# Patient Record
Sex: FEMALE | Race: WHITE
[De-identification: names, ages, dates, MRNs, and addresses within clinical notes are randomized per-mention and may not be internally consistent; named-entity substitution may affect disease eponyms.]

---

## 2017-03-15 ENCOUNTER — HOSPITAL ENCOUNTER (INPATIENT)
Dept: HOSPITAL 74 - JER | Age: 57
LOS: 16 days | Discharge: HOME | DRG: 853 | End: 2017-03-31
Attending: INTERNAL MEDICINE | Admitting: INTERNAL MEDICINE
Payer: COMMERCIAL

## 2017-03-15 VITALS — BODY MASS INDEX: 39.6 KG/M2

## 2017-03-15 DIAGNOSIS — E03.9: ICD-10-CM

## 2017-03-15 DIAGNOSIS — K75.0: ICD-10-CM

## 2017-03-15 DIAGNOSIS — J98.11: ICD-10-CM

## 2017-03-15 DIAGNOSIS — I10: ICD-10-CM

## 2017-03-15 DIAGNOSIS — Z71.3: ICD-10-CM

## 2017-03-15 DIAGNOSIS — K52.9: ICD-10-CM

## 2017-03-15 DIAGNOSIS — J98.4: ICD-10-CM

## 2017-03-15 DIAGNOSIS — D72.829: ICD-10-CM

## 2017-03-15 DIAGNOSIS — J90: ICD-10-CM

## 2017-03-15 DIAGNOSIS — E87.6: ICD-10-CM

## 2017-03-15 DIAGNOSIS — J18.9: ICD-10-CM

## 2017-03-15 DIAGNOSIS — Z98.84: ICD-10-CM

## 2017-03-15 DIAGNOSIS — R18.8: ICD-10-CM

## 2017-03-15 DIAGNOSIS — A41.9: Primary | ICD-10-CM

## 2017-03-15 DIAGNOSIS — K44.9: ICD-10-CM

## 2017-03-15 DIAGNOSIS — E66.01: ICD-10-CM

## 2017-03-15 DIAGNOSIS — K31.4: ICD-10-CM

## 2017-03-15 DIAGNOSIS — R00.0: ICD-10-CM

## 2017-03-15 DIAGNOSIS — K63.89: ICD-10-CM

## 2017-03-15 DIAGNOSIS — H92.01: ICD-10-CM

## 2017-03-15 DIAGNOSIS — K59.00: ICD-10-CM

## 2017-03-15 LAB
ALBUMIN SERPL-MCNC: 3.1 G/DL (ref 3.4–5)
ALP SERPL-CCNC: 79 U/L (ref 45–117)
ALT SERPL-CCNC: 13 U/L (ref 12–78)
ANION GAP SERPL CALC-SCNC: 9 MMOL/L (ref 8–16)
AST SERPL-CCNC: 11 U/L (ref 15–37)
BASOPHILS # BLD: 0.5 % (ref 0–2)
BILIRUB SERPL-MCNC: 0.8 MG/DL (ref 0.2–1)
CALCIUM SERPL-MCNC: 8.1 MG/DL (ref 8.5–10.1)
CO2 SERPL-SCNC: 26 MMOL/L (ref 21–32)
CREAT SERPL-MCNC: 0.6 MG/DL (ref 0.55–1.02)
DEPRECATED RDW RBC AUTO: 13.7 % (ref 11.6–15.6)
EOSINOPHIL # BLD: 0.4 % (ref 0–4.5)
GLUCOSE SERPL-MCNC: 116 MG/DL (ref 74–106)
INR BLD: 1.48 (ref 0.82–1.09)
MCH RBC QN AUTO: 28.8 PG (ref 25.7–33.7)
MCHC RBC AUTO-ENTMCNC: 34.4 G/DL (ref 32–36)
MCV RBC: 83.7 FL (ref 80–96)
NEUTROPHILS # BLD: 88.3 % (ref 42.8–82.8)
PMV BLD: 7.9 FL (ref 7.5–11.1)
PROT SERPL-MCNC: 7.4 G/DL (ref 6.4–8.2)
PT PNL PPP: 16.4 SEC (ref 9.98–11.88)
TROPONIN I SERPL-MCNC: < 0.02 NG/ML (ref 0–0.05)
WBC # BLD AUTO: 14.5 K/MM3 (ref 4–10)

## 2017-03-15 NOTE — PDOC
Rapid Medical Evaluation


Time Seen by Provider: 03/15/17 17:42


Medical Evaluation: 


 Allergies











Allergy/AdvReac Type Severity Reaction Status Date / Time


 


No Known Allergies Allergy   Verified 03/15/17 17:42














03/15/17 17:43


I have performed a brief in-person evaluation of this patient.


Ms. Krishna is a 57 yo F with a history of HTN, hypothyroidism presents with 

a chief complaint of Right abdominal pain and right chest pain


Pain began 2 weeks ago


She was seen in Urgent care, diagnosed with muscle spasm, given pain medications


Was then seen by PMD 1 week ago, given pain medications


Never got chest xray


(+) dizziness


(+) sob


(-) nausea, vomiting, diarrhea


Took Tylenol at 3pm for pain





Pertinent physical exam findings:


Temp 100.5, HR: 130, O2: 95%


Tachycardiac


Right upper abdominal pain, Right lower costal pain


No rash


Decreased breath sounds Right base





I have ordered the following:


EKG, sepsis order set





Pt will be taken immediately to the ER


03/15/17 17:43








03/15/17 17:48








03/15/17 17:49








03/15/17 17:55


EKG: Sinus tachycardia, no ischemic changes








03/15/17 17:57

## 2017-03-15 NOTE — PN
<Ella Berg - Last Filed: 03/15/17 23:14>





Teaching Attending Note


Name of Resident: Loimerrill Ramsay





<Maria Luisa Kumar - Last Filed: 03/16/17 04:04>





Teaching Attending Note


ATTENDING PHYSICIAN STATEMENT





I saw and evaluated the patient.


I reviewed the resident's note and discussed the case with the resident.


I agree with the resident's findings and plan as documented.








SUBJECTIVE:


The patient is a 57 year old female, with a significant past medical history of 

HTN, who presents to the ED with R sided abdominal pain and chest pain that 

began 2 days ago. The patient reports the chest pain is localized to her R side 

and radiates under her breast to her back. The patient states the chest pain is 

constant, varying in intensity and associated with nausea. The patient 

describes her abdominal pain is associated with vomiting. The patient was seen 

at [facility] and was diagnosed with muscle spasm for which she was given 

methocarbamol 500 mg. She denies headache or dizziness. She denies fever, 

chills or diarrhea. She denies dysuria, frequency, urgency or hematuria.  





PMHx: hypothyroidism, migraines, gallstones, incarcerated R ventral hernia 


PSHx- cholecystectomy, hernioplasty.


Social Hx: Denies toxic habits


Fam hx: Both aunts had liver cancer, Mother: HTN, dementia and Dad: HTN. 





OBJECTIVE:


 Last Vital Signs











Temp Pulse Resp BP Pulse Ox


 


 98.1 F   102 H  20   107/75   97 


 


 03/16/17 00:01  03/16/17 00:01  03/16/17 01:04  03/16/17 00:01  03/16/17 01:04








GENERAL: + Class II Obese. + Pale. Awake, alert, and fully oriented, in no 

acute distress


HEENT: Atraumatic. PERRLA, EOMI. Moist mucosa. No JVD


LUNGS: +R lower lung diminished breath sound. No distress, speaks full sentences

, clear to auscultation bilaterally 


HEART: Regular rate and rhythm, normal S1 and S2, no murmurs, rubs or gallops, 

peripheral pulses normal and equal bilaterally. 


ABDOMEN: +RUQ tenderness with minimal guarding no rebound. Soft, normoactive 

bowel sounds. No masses


EXTREMITIES: +1 Pitting edema. Normal inspection, Normal range of motion. No 

clubbing or cyanosis. 


NEUROLOGICAL: Cranial nerves II through XII grossly intact.  Normal speech, 

normal gait, no focal sensorimotor deficits 


SKIN: + Surgical scar supraumbilica and infraumbilical area. Warm, Dry, normal 

turgor, no rashes or lesions noted.








 CBCD











WBC  14.5 K/mm3 (4.0-10.0)  H D 03/15/17  22:50    


 


RBC  3.90 M/mm3 (3.60-5.2)   03/15/17  22:50    


 


Hgb  11.2 GM/dL (10.7-15.3)  D 03/15/17  22:50    


 


Hct  32.7 % (32.4-45.2)   03/15/17  22:50    


 


MCV  83.7 fl (80-96)   03/15/17  22:50    


 


MCHC  34.4 g/dl (32.0-36.0)   03/15/17  22:50    


 


RDW  13.7 % (11.6-15.6)   03/15/17  22:50    


 


Plt Count  332 K/MM3 (134-434)   03/15/17  22:50    


 


MPV  7.9 fl (7.5-11.1)   03/15/17  22:50    








 CMP











Sodium  134 mmol/L (136-145)  L  03/15/17  18:02    


 


Potassium  3.5 mmol/L (3.5-5.1)   03/15/17  18:02    


 


Chloride  99 mmol/L ()   03/15/17  18:02    


 


Carbon Dioxide  26 mmol/L (21-32)   03/15/17  18:02    


 


Anion Gap  9  (8-16)   03/15/17  18:02    


 


BUN  11 mg/dL (7-18)   03/15/17  18:02    


 


Creatinine  0.6 mg/dL (0.55-1.02)   03/15/17  18:02    


 


Creat Clearance w eGFR  > 60  (>60)   03/15/17  18:02    


 


Calcium  8.1 mg/dL (8.5-10.1)  L  03/15/17  18:02    


 


Total Bilirubin  0.8 mg/dL (0.2-1.0)  D 03/15/17  18:02    


 


AST  11 U/L (15-37)  L D 03/15/17  18:02    


 


ALT  13 U/L (12-78)  D 03/15/17  18:02    


 


Alkaline Phosphatase  79 U/L ()   03/15/17  18:02    


 


Total Protein  7.4 g/dl (6.4-8.2)   03/15/17  18:02    


 


Albumin  3.1 g/dl (3.4-5.0)  L  03/15/17  18:02    








Imaging:


Chest CT w Contrast


Impression: There is no CT evidence of pulmonary embolism. A small to moderate 

right pleural effusion is noted with associated basilar compressive 

atelectasis. In comparison to an abdomen CT study of 9/27/2014 interval 

development of a 5 x 3.5 x 2.4 cm mass lesion is noted along the posterior 

border of the right hepatic lobe which may represent a peritoneal implant. 

Additional interval findings are noted within the partially imaged upper 

abdomen as discussed. 








ASSESSMENT AND PLAN:





1.) Liver mass r/o hepatocellular carcinoma vs metastasis vs benign 


CT abdomen and pelvis


AFP


Hepatitis B and C studies


D/c usg


Coagulation profile


Consider GI consult


Thoracentesis


IR consult


Liver US


Patient denied any pain medications








Patient admitted to Med Surg


Documentation prepared by Maria Luisa Kumar, acting as medical scribe for Ella Berg MD.

## 2017-03-15 NOTE — PDOC
History of Present Illness





- General


History Source: Patient


Exam Limitations: No Limitations





- History of Present Illness


Initial Comments: 


03/15/17 19:32


The patient is a 57 year old female, with significant past medical history of 

hypothyroidism, HTN, who presents today complaining of 2 weeks of right 

abdominal pain and right sided chest pain. The patient states that the pain 

started on her upper right side and has progressively radiated to under the 

right breast and the back. The pain is constant, but increases in intensity at 

times.  She states that the pain is accompanied by nausea. The last time she 

vomited was on Monday, 2 days ago. She states that she did not eat today 

because the pain and medication were making her nauseous. She notes that she 

visited the Urgent Care at [facility] for this pain 2 weeks ago where they 

treated her for a muscle spasm. She was prescribed Methocarbamol (500mg) for 

the pain has been taking it intermittently. 





Denies fever, chills, vomiting. 


Denies SOB, cough. 





Allergies: none reported


Surgical Hx: cholecystectomy


PCP: Dr. Linn ([facility])











<Antonia Crawford - Last Filed: 03/15/17 22:37>





<Ej Faustin - Last Filed: 03/15/17 22:56>





- General


Chief Complaint: Pain, Acute


Stated Complaint: PAIN, ACUTE


Time Seen by Provider: 03/15/17 17:42





Past History





<Antonia Crawford - Last Filed: 03/15/17 22:37>





- Past Medical History


HTN: Yes


Thyroid Disease: Yes





- Surgical History


Abdominal Surgery: Yes (laparotomy. HERNIA ERPAIR X 2)


Cholecystectomy: Yes





- Psycho/Social/Smoking Cessation Hx


Anxiety: No


Suicidal Ideation: No


Smoking History: Never smoked


Have you smoked in the past 12 months: No


Number of Cigarettes Smoked Daily: 0


Hx Alcohol Use: No


Drug/Substance Use Hx: No


Substance Use Type: None


Hx Substance Use Treatment: No





<Ej Faustin - Last Filed: 03/15/17 22:56>





- Past Medical History


Allergies/Adverse Reactions: 


 Allergies











Allergy/AdvReac Type Severity Reaction Status Date / Time


 


No Known Allergies Allergy   Verified 03/15/17 17:47











Home Medications: 


Ambulatory Orders





Levothyroxine [Synthroid -] 125 mcg PO DAILY 09/27/14 


Amlodipine Besylate [Norvasc -] 10 mg PO DAILY 03/15/17 


Losartan/Hydrochlorothiazide [Losartan-Hctz 50-12.5 mg Tab] 1 each PO DAILY 03/

15/17 


Methocarbamol [Robaxin -] 500 mg PO DAILY PRN 03/15/17 











**Review of Systems





- Review of Systems


Able to Perform ROS?: Yes


Comments:: 





03/15/17 19:32


CONSTITUTIONAL: No fever, no chills, no fatigue


EYES: No visual changes


ENT: No ear pain, no sore throat


CARDIOVASCULAR: +right sided chest pain. No palpitations


RESPIRATORY: No cough, no SOB


GI: +nausea. No abdominal pain, no vomiting, no constipation, no diarrhea


GENITOURINARY: No dysuria, no frequency, no hematuria


MUSKULOSKELETAL: No backpain, no joint pain, no myalgias


SKIN: No rash


NEURO: No headache





<Antonia Crawford - Last Filed: 03/15/17 22:37>





*Physical Exam





- Vital Signs


 Last Vital Signs











Temp Pulse Resp BP Pulse Ox


 


 100.5 F H  130 H  18   145/68   95 


 


 03/15/17 17:43  03/15/17 17:43  03/15/17 17:43  03/15/17 17:43  03/15/17 17:43














- Physical Exam


Comments: 





03/15/17 19:58


CONSTITUTIONAL: Awake, alert. Morbidly obese. In moderate distress. 


HEAD: Normocephalic; atraumatic


EYES: PERRL; EOM intact. No icterus


ENMT: External appears normal; normal oropharynx. Mucous membranes dry. 


NECK: Supple; non-tender; no cervical lymphadenopathy


CARD: Normal S1, S2; no murmurs, rubs, or gallops


RESP: Normal chest excursion with respiration; Decreased breast sounds at the 

right base. no wheezes, rhonchi, or rales


CHEST: Severe right chest wall tenderness  intercostal spaces 6,7, and 8 along 

the midclavicular line.


ABD: Soft, non-distended; non-tender; no palpable organomegaly, no palpable


hernias


EXT: Normal ROM in all four extremities; non-tender to palpation; distal pulses 

intact


SKIN: Warm, dry, no rash


NEURO: No focal neurological deficiencies.





<Antonia Crawford - Last Filed: 03/15/17 22:37>





- Vital Signs


 Last Vital Signs











Temp Pulse Resp BP Pulse Ox


 


 100.5 F H  130 H  18   145/68   95 


 


 03/15/17 17:43  03/15/17 17:43  03/15/17 17:43  03/15/17 17:43  03/15/17 17:43














<Ej Faustin - Last Filed: 03/15/17 22:56>





**Heart Score/ECG Review


  ** #1





03/15/17 19:36


Sinus tachycardia


Otherwise normal ECG





<Antonia Crawford - Last Filed: 03/15/17 22:37>





ED Treatment Course





- LABORATORY


CBC & Chemistry Diagram: 


 03/15/17 18:02





 03/15/17 18:02





- Medications


Given in the ED: 


ED Medications














Discontinued Medications














Generic Name Dose Route Start Last Admin





  Trade Name Freq  PRN Reason Stop Dose Admin


 


Ibuprofen  600 mg 03/15/17 17:55 03/15/17 19:14





  Motrin -  PO 03/15/17 17:56  Not Given





  ONCE ONE   














<Antonia Crawford - Last Filed: 03/15/17 22:37>





- LABORATORY


CBC & Chemistry Diagram: 


 03/15/17 18:02





 03/15/17 18:02





<Ej Faustin - Last Filed: 03/15/17 22:56>





Medical Decision Making





- Medical Decision Making





03/15/17 22:54


Patient is a morbidly obese 56-year-old female with history of hypertension and 

hypothyroidism who presented with increasing right lower chest and right upper 

abdominal pain for the past 2 weeks. Initial evaluation, patient noted to be 

febrile and tachycardic with decreased breath sounds at the right base. Initial 

CBC revealed leukocytosis of 12.3 with predominance of neutrophils. Chest x-ray 

revealed right lower lobe infiltrate. Blood cultures were obtained and patient 

received ceftriaxone and Zithromax IV. After administration of IV antibiotics, 

left EJ peripheral line infiltrated a small amount of fluid extravasated 

subcutaneously.   Ice was applied. A 22-gauge peripheral IV catheter was 

inserted and left forearm. CT of chest with IV contrast was obtained for the PE 

protocol. No evidence of pulmonary embolism was noted, however, moderate size 

right pleural effusion and compressive atelectasis was noted. Also noted was a 

5 cm right hepatic lobe mass which may represent a peritoneal implant. Patient 

will require admission for further evaluation and treatment of right pleural 

effusion and hepatic lobe infiltrate. Patient may require thoracentesis for 

evaluation of possible empyema versus malignant effusion.





<Ej Faustin - Last Filed: 03/15/17 22:56>





*DC/Admit/Observation/Transfer





- Attestations


Scribe Attestion: 





03/15/17 19:32





Documentation prepared by MAUREEN Cobb, acting as medical scribe 

for Ej Faustin MD.





<Antonia Crawford - Last Filed: 03/15/17 22:37>





- Discharge Dispostion


Admit: Yes





- Attestations


Physician Attestion: 





03/15/17 22:36


The documentation was prepared by the scribe under my direct supervision. I 

have reviewed the documentation which correctly represents the findings, 

medical decision-making and critical action taken by me.





<Ej Faustin - Last Filed: 03/15/17 22:56>


Diagnosis at time of Disposition: 


 Pleural effusion, Liver mass, right lobe

## 2017-03-16 LAB
ALBUMIN SERPL-MCNC: 3 G/DL (ref 3.4–5)
ANION GAP SERPL CALC-SCNC: 11 MMOL/L (ref 8–16)
APPEARANCE PLR: (no result)
APTT BLD: 33.8 SECONDS (ref 26.9–34.4)
BASOPHILS # BLD: 0.7 % (ref 0–2)
BILIRUB DIRECT SERPL-MCNC: 158 U/L (ref 84–246)
CALCIUM SERPL-MCNC: 7.9 MG/DL (ref 8.5–10.1)
CHLORIDE PLR-SCNC: 99.39 MMOL/L
CO2 SERPL-SCNC: 28 MMOL/L (ref 21–32)
COLOR PLR: (no result)
CREAT SERPL-MCNC: 0.6 MG/DL (ref 0.55–1.02)
DEPRECATED RDW RBC AUTO: 13.9 % (ref 11.6–15.6)
EOSINOPHIL # BLD: 1.3 % (ref 0–4.5)
FIBRINOGEN PPP-MCNC: 512 MG/DL (ref 238–498)
GLUCOSE PLR-MCNC: 68.32 MG/DL
GLUCOSE SERPL-MCNC: 106 MG/DL (ref 74–106)
INR BLD: 1.54 (ref 0.82–1.09)
LDH PLR-CCNC: 961.94 U/L
LYMPHOCYTES NFR PLR MANUAL: 30 %
MCH RBC QN AUTO: 28.5 PG (ref 25.7–33.7)
MCHC RBC AUTO-ENTMCNC: 33.5 G/DL (ref 32–36)
MCV RBC: 85.1 FL (ref 80–96)
NEUTROPHILS # BLD: 84.6 % (ref 42.8–82.8)
NEUTROPHILS NFR PLR MANUAL: 35 %
PLATELET # BLD AUTO: 332 K/MM3 (ref 134–434)
PLATELET # BLD AUTO: 333 K/MM3 (ref 134–434)
PLATELET # BLD EST: ADEQUATE 10*3/UL
PLATELET COMMENT2: (no result)
PLATELET COMMENT3: (no result)
PLEURAL FLUID SOURCE: (no result)
PMV BLD: 7.5 FL (ref 7.5–11.1)
PROT PLR-MCNC: 5.1 G/L
PT PNL PPP: 17.1 SEC (ref 9.98–11.88)
WBC # BLD AUTO: 9.8 K/MM3 (ref 4–10)

## 2017-03-16 PROCEDURE — 0W993ZX DRAINAGE OF RIGHT PLEURAL CAVITY, PERCUTANEOUS APPROACH, DIAGNOSTIC: ICD-10-PCS | Performed by: RADIOLOGY

## 2017-03-16 RX ADMIN — CEFTRIAXONE SODIUM SCH MLS/HR: 2 INJECTION, POWDER, FOR SOLUTION INTRAMUSCULAR; INTRAVENOUS at 17:48

## 2017-03-16 RX ADMIN — HEPARIN SODIUM SCH UNIT: 5000 INJECTION, SOLUTION INTRAVENOUS; SUBCUTANEOUS at 22:06

## 2017-03-16 RX ADMIN — ACETAMINOPHEN PRN MG: 325 TABLET ORAL at 09:29

## 2017-03-16 RX ADMIN — AZITHROMYCIN DIHYDRATE SCH MLS/HR: 500 INJECTION, POWDER, LYOPHILIZED, FOR SOLUTION INTRAVENOUS at 17:48

## 2017-03-16 RX ADMIN — POTASSIUM CHLORIDE SCH MEQ: 1500 TABLET, EXTENDED RELEASE ORAL at 22:05

## 2017-03-16 RX ADMIN — LEVOTHYROXINE SODIUM SCH MCG: 125 TABLET ORAL at 06:40

## 2017-03-16 RX ADMIN — LOSARTAN POTASSIUM SCH MG: 50 TABLET, FILM COATED ORAL at 14:36

## 2017-03-16 RX ADMIN — AMLODIPINE BESYLATE SCH MG: 10 TABLET ORAL at 14:36

## 2017-03-16 RX ADMIN — ACETAMINOPHEN PRN MG: 325 TABLET ORAL at 22:06

## 2017-03-16 RX ADMIN — ACETAMINOPHEN PRN MG: 325 TABLET ORAL at 17:49

## 2017-03-16 RX ADMIN — HYDROCHLOROTHIAZIDE SCH MG: 12.5 CAPSULE ORAL at 14:36

## 2017-03-16 NOTE — PN
Physical Exam: 


SUBJECTIVE: Patient seen and examined at bedside 


Feels better after thoracentesis 








OBJECTIVE:





 Vital Signs











 Period  Temp  Pulse  Resp  BP Sys/Olivares  Pulse Ox


 


 Last 24 Hr  98.1 F-102 F  102-117  18-20  107-152/64-93  92-99











GENERAL: The patient is awake, alert, and fully oriented, in no acute distress.


HEAD: Normal with no signs of trauma. 


NECK:  supple. 


LUNGS: Breath sounds equal, clear to auscultation bilaterally, no wheezes, no 

crackles, no 


accessory muscle use. 


HEART: Regular rate and rhythm, S1, S2 without murmur, rub or gallop.


ABDOMEN: right sided abdominal trenderness right flank tenderness right chest 

tenderness soft non distended 


NEUROLOGICAL: Cranial nerves II through XII grossly intact. Normal speech


PSYCH: Normal mood, normal affect.

















 Laboratory Results - last 24 hr











  03/16/17 03/16/17 03/16/17





  07:50 07:50 08:05


 


WBC  9.8  D  


 


RBC  4.13  


 


Hgb  11.8  


 


Hct  35.1  


 


MCV  85.1  


 


MCHC  33.5  


 


RDW  13.9  


 


Plt Count  333  


 


MPV  7.5  


 


Neutrophils %  84.6 H  


 


Lymphocytes %  5.8 L D  


 


Monocytes %  7.6  


 


Eosinophils %  1.3  D  


 


Basophils %  0.7  


 


INR    1.54 H


 


PTT (Actin FS)    33.8


 


Fibrinogen    512.0 H


 


Fibrin Degrad Products    >10 & <40


 


Sodium   136 


 


Potassium   3.3 L 


 


Chloride   97 L 


 


Carbon Dioxide   28 


 


Anion Gap   11 


 


BUN   9 


 


Creatinine   0.6 


 


Random Glucose   106 


 


Calcium   7.9 L 


 


LD Total   158 


 


Albumin   3.0 L 


 


Pleural Fluid Source   


 


Pleural Color   


 


Pleural Appearance   


 


Pleural WBC   


 


Pleural RBC   


 


Pleural Chloride   


 


Pleural Total Protein   


 


Pleural Albumin   


 


Pleural LDH   


 


Pleural Glucose   


 


Pleural Amylase   


 


Pleural Cholesterol   


 


Pleural Triglycerides   














  03/16/17





  11:25


 


WBC 


 


RBC 


 


Hgb 


 


Hct 


 


MCV 


 


MCHC 


 


RDW 


 


Plt Count 


 


MPV 


 


Neutrophils % 


 


Lymphocytes % 


 


Monocytes % 


 


Eosinophils % 


 


Basophils % 


 


INR 


 


PTT (Actin FS) 


 


Fibrinogen 


 


Fibrin Degrad Products 


 


Sodium 


 


Potassium 


 


Chloride 


 


Carbon Dioxide 


 


Anion Gap 


 


BUN 


 


Creatinine 


 


Random Glucose 


 


Calcium 


 


LD Total 


 


Albumin 


 


Pleural Fluid Source  Right pleural


 


Pleural Color  Cloudy


 


Pleural Appearance  Cloudy


 


Pleural WBC  4280


 


Pleural RBC  6843


 


Pleural Chloride  99.39


 


Pleural Total Protein  5.1


 


Pleural Albumin  2


 


Pleural LDH  961.94


 


Pleural Glucose  68.324


 


Pleural Amylase  19.762


 


Pleural Cholesterol  83


 


Pleural Triglycerides  35








Active Medications











Generic Name Dose Route Start Last Admin





  Trade Name Freq  PRN Reason Stop Dose Admin


 


Acetaminophen  650 mg 03/16/17 03:42 03/16/17 09:29





  Tylenol -  PO   650 mg





  Q6H PRN   Administration





  FEVER OR PAIN   


 


Amlodipine Besylate  10 mg 03/16/17 10:00 03/16/17 14:36





  Norvasc -  PO   10 mg





  DAILY ROBIN   Administration


 


Heparin Sodium (Porcine)  5,000 unit 03/16/17 22:00  





  Heparin -  SQ   





  TID ROBIN   


 


Hydrochlorothiazide  12.5 mg 03/16/17 11:00 03/16/17 14:36





  Hctz -  PO   12.5 mg





  DAILY ROBIN   Administration


 


Ceftriaxone Sodium  100 mls @ 200 mls/hr 03/16/17 15:00  





  Rocephin 2gm Ivpb (Pre-Docked)  IVPB   





  DAILY ROBIN   


 


Azithromycin  250 mls @ 250 mls/hr 03/16/17 15:00  





  Zithromax 500mg Ivpb (Pre-Docked)  IVPB   





  [facility]ILY [facility]   


 


Levothyroxine Sodium  125 mcg 03/16/17 07:00 03/16/17 06:40





  Synthroid -  PO   125 mcg





  DAILY@0700 ROBIN   Administration


 


Losartan Potassium  50 mg 03/16/17 10:00 03/16/17 14:36





  Cozaar -  PO   50 mg





  DAILY ROBIN   Administration


 


Morphine Sulfate  2 mg 03/16/17 03:14  





  Morphine Injection -  IVPUSH   





  Q6H PRN   





  PAIN   


 


Potassium Chloride  40 meq 03/16/17 22:00  





  K-Dur -  PO 03/17/17 10:01  





  [facility]ID [facility]   











ASSESSMENT/PLAN:


55 y/o F w/PMH of HTN, hypothyroidism presented to ER w/ R sided chest/

abdominal pain x 2 days. Found to have R pleural effusion and thought to have a 

new liver mass lesion.





Right pleural effusion/Community acquired pneumonia/Sepsis (leukocytosis and 

fever): after evaluation by ID it is thought she has a pneumonia with pleuritic 

chest pain. 


s/p thoracentesis 


ID consult appreciated 


Azithromycin 


Ceftriaxone


f/u pleural fluid studies 





Hepatic mass: this was seen on chest CT also read as a mass on abdominal CT 

after reviewing the scans with radiology they do not feel they have seen a mass


LFTs WNL 


will hold off on GI consult for hepatology evaluation


will see if right flank/chest/abdominal pain improves with treatment of 

pneumonia





HTN


continue Home emds Norvasc Losartan and HCTZ





Hypothyroidism


Syntrhoid 125mcg qam 





-FEN


Stop IVF


hypokalemia will replete 


Low sodium diet 





PPx:


HSQ/SCDs


No criteria met for GI PPx


no PT evaluation needed 





possible D/C in next 1-2 days 





Visit type





- Emergency Visit


Emergency Visit: Yes


ED Registration Date: 03/15/17


Care time: The patient presented to the Emergency Department on the above date 

and was hospitalized for further evaluation of their emergent condition.





- New Patient


This patient is new to me today: Yes


Date on this admission: 03/16/17





- Critical Care


Critical Care patient: No

## 2017-03-16 NOTE — EKG
Test Reason : 

Blood Pressure : ***/*** mmHG

Vent. Rate : 124 BPM     Atrial Rate : 124 BPM

   P-R Int : 148 ms          QRS Dur : 088 ms

    QT Int : 318 ms       P-R-T Axes : 034 076 022 degrees

   QTc Int : 456 ms

 

SINUS TACHYCARDIA

OTHERWISE NORMAL ECG

WHEN COMPARED WITH ECG OF 07-SEP-2015 19:09,

VENT. RATE HAS INCREASED BY  56 BPM

T WAVE INVERSION NOW EVIDENT IN INFERIOR LEADS

Confirmed by PILAR EASTMAN, JOBY ([id number]) on 3/16/2017 11:38:03 AM

 

Referred By:             Confirmed By:JOBY QUEZADA MD

## 2017-03-16 NOTE — HP
CHIEF COMPLAINT: R sided chest/abdominal pain





PCP: Dr. Linn





HISTORY OF PRESENT ILLNESS:


55 y/o F w/PMH of HTN, hypothyroidism presented to ER w/ R sided chest/

abdominal pain x 2 days. Pain has been getting progressively worse, is 

aggravated by movement, and is constant.  Pt has also had subjective fevers and 

chills for last 2 weeks.  Pt went to urgent care 2 weeks ago for this pain and 

was given muscle relaxants but they did not help. Pt went to PCP one week ago 

and was given ibuprofen 800 mg q8h and percocet. Percocet helped with pain but 

overall pain was still progressively worse.  Pt felt nauseous this AM from 

pain. Pt has difficulty w/taking deep breaths due to pain. Pt denies SOB, HA, 

vomiting.





ER course was notable for:


(1) CXR, CT chest w/contrast


(2) azithromycin, ceftriaxone


(3)








PAST MEDICAL HISTORY: htn, hypothyroidism





PAST SURGICAL HISTORY: hernia repair x2 (2yrs ago and 3yrs ago); 

cholecystectomy 





Social History:


Smoking: never


Alcohol: denies


Drugs: denies





Family History: Mother: asthma, htn; 2 aunts  of liver ca at ages 50 and 55


Allergies





No Known Allergies Allergy (Verified 03/15/17 17:47)


 








HOME MEDICATIONS:


 Home Medications











 Medication  Instructions  Recorded


 


Levothyroxine [Synthroid -] 125 mcg PO DAILY 14


 


Amlodipine Besylate [Norvasc -] 10 mg PO DAILY 03/15/17


 


Losartan/Hydrochlorothiazide 1 each PO DAILY 03/15/17





[Losartan-Hctz 50-12.5 mg Tab]  


 


Methocarbamol [Robaxin -] 500 mg PO DAILY PRN 03/15/17








REVIEW OF SYSTEMS


CONSTITUTIONAL: 


fever, chills


Absent:  diaphoresis, generalized weakness, malaise, loss of appetite, weight 

change


HEENT: 


Absent:  rhinorrhea, nasal congestion, throat pain, throat swelling, difficulty 

swallowing, mouth swelling, ear pain, eye pain, visual changes


CARDIOVASCULAR: 


r sided chest pain


Absent: syncope, palpitations, irregular heart rate, lightheadedness, 

peripheral edema


RESPIRATORY: 


Absent: cough, shortness of breath, dyspnea with exertion, orthopnea, wheezing, 

stridor, hemoptysis


GASTROINTESTINAL:


nausea


Absent: abdominal pain, abdominal distension, vomiting, diarrhea, constipation, 

melena, hematochezia


GENITOURINARY: 


Absent: dysuria, frequency, urgency, hesitancy, hematuria, flank pain, genital 

pain


MUSCULOSKELETAL: 


Absent: myalgia, arthralgia, joint swelling, back pain, neck pain


SKIN: 


Absent: rash, itching, pallor


HEMATOLOGIC/IMMUNOLOGIC: 


Absent: easy bleeding, easy bruising, lymphadenopathy, frequent infections


ENDOCRINE:


Absent: unexplained weight gain, unexplained weight loss, heat intolerance, 

cold intolerance


NEUROLOGIC: 


Absent: headache, focal weakness or paresthesias, dizziness, unsteady gait, 

seizure, mental status changes, bladder or bowel incontinence


PSYCHIATRIC: 


Absent: anxiety, depression, suicidal or homicidal ideation, hallucinations.








PHYSICAL EXAMINATION


 


 Vital Signs











Temperature  98.1 F   17 00:01


 


Pulse Rate  102 H  17 00:01


 


Respiratory Rate  20   17 01:04


 


Blood Pressure  107/75   17 00:01


 


O2 Sat by Pulse Oximetry (%)  97   17 01:04














GENERAL: Awake, alert, and fully oriented, in no acute distress.


HEAD: Normal with no signs of trauma.


EYES: extraocular movements intact, sclera anicteric, conjunctiva clear. No lid 

lag.


EARS, NOSE, THROAT: Ears normal, nares patent, Moist mucous membranes.


NECK: Normal range of motion


LUNGS: Decreased breath sounds at R base.


HEART: tachycardic, normal S1 and S2 without murmur, rub or gallop.


ABDOMEN: Soft, nontender, not distended, normoactive bowel sounds, no guarding, 

no rebound, no masses.  No hepatomegaly or  splenomegaly. 


LOWER EXTREMITIES: 2+ pulses, warm, well-perfused. No calf tenderness 


NEUROLOGICAL:  Normal speech. Normal gait.


PSYCHIATRIC: Cooperative. Good eye contact. Appropriate mood and affect.


SKIN: Warm, dry, normal turgor, no rashes or lesions noted. 








 CBCD











WBC  14.5 K/mm3 (4.0-10.0)  H D 03/15/17  22:50    


 


RBC  3.90 M/mm3 (3.60-5.2)   03/15/17  22:50    


 


Hgb  11.2 GM/dL (10.7-15.3)  D 03/15/17  22:50    


 


Hct  32.7 % (32.4-45.2)   03/15/17  22:50    


 


MCV  83.7 fl (80-96)   03/15/17  22:50    


 


MCHC  34.4 g/dl (32.0-36.0)   03/15/17  22:50    


 


RDW  13.7 % (11.6-15.6)   03/15/17  22:50    


 


Plt Count  332 K/MM3 (134-434)   03/15/17  22:50    


 


MPV  7.9 fl (7.5-11.1)   03/15/17  22:50    








 CMP











Sodium  134 mmol/L (136-145)  L  03/15/17  18:02    


 


Potassium  3.5 mmol/L (3.5-5.1)   03/15/17  18:02    


 


Chloride  99 mmol/L ()   03/15/17  18:02    


 


Carbon Dioxide  26 mmol/L (21-32)   03/15/17  18:02    


 


Anion Gap  9  (8-16)   03/15/17  18:02    


 


BUN  11 mg/dL (7-18)   03/15/17  18:02    


 


Creatinine  0.6 mg/dL (0.55-1.02)   03/15/17  18:02    


 


Creat Clearance w eGFR  > 60  (>60)   03/15/17  18:02    


 


Random Glucose  116 mg/dL ()  H D 03/15/17  18:02    


 


Calcium  8.1 mg/dL (8.5-10.1)  L  03/15/17  18:02    


 


Total Bilirubin  0.8 mg/dL (0.2-1.0)  D 03/15/17  18:02    


 


AST  11 U/L (15-37)  L D 03/15/17  18:02    


 


ALT  13 U/L (12-78)  D 03/15/17  18:02    


 


Alkaline Phosphatase  79 U/L ()   03/15/17  18:02    


 


Total Protein  7.4 g/dl (6.4-8.2)   03/15/17  18:02    


 


Albumin  3.1 g/dl (3.4-5.0)  L  03/15/17  18:02    








 CARDIAC ENZYMES











Creatine Kinase  41 IU/L ()   03/15/17  18:02    


 


Troponin I  < 0.02 ng/ml (0.00-0.05)   03/15/17  18:02    











CT chest w/contrast 3/15/17: Small to moderate R pleural effusion w/associated 

basilar compressive atelectasis. In comparison to an abd CT study of 14 

interval development of a 5 x 3.5 x 2.4 cm mass lesion is noted along posterior 

border of the right hepatic lobe which may represent a peritoneal implant. A 

stable 1 x 0.5cm subpleural nodule is seen w/in th eR lower chest medially.








Active Medications





Morphine Sulfate (Morphine Injection -)  2 mg IVPUSH Q6H PRN


   PRN Reason: PAIN














ASSESSMENT/PLAN:


55 y/o F w/PMH of HTN, hypothyroidism presented to ER w/ R sided chest/

abdominal pain x 2 days. Found to have R pleural effusion and new liver mass 

lesion.





-R pleural effusion


   -as seen on CT chest


   -npo, thoracentesis by IR


   -serum LDH ordered for comparison w/pleural fluid LDH


   -possibly secondary to liver mass lesion


   -pain control w/morphine 2mg iv q6h prn





-Hepatic mass lesion


   -Abd/pelvis CT; 


   -AFP, Hep B and C panel


   -Consider GI consult


   -possible malignancy





-Leukocytosis


   -likely reactive to pleural effusion


   -lactic acid wnl


   -no abx at this time


   -f/u BCx


   -tylenol 650 mg po q6h prn for fever/pain





-HTN


   -norvasc 10 mg po qd, losartan/hctz 50/12.5 at home


   -will hold as BP is normotensive currently





-Hypothyroidism


   -c/w synthroid 125 mcg qd





-FEN


   -NS @ 75 ml/hr


   -Hyponatremia (mild), on fluids, monitor


   -NPO for now





-Dispo:


   -admit to med/surg





Visit type





- Emergency Visit


Emergency Visit: Yes


ED Registration Date: 03/15/17


Care time: The patient presented to the Emergency Department on the above date 

and was hospitalized for further evaluation of their emergent condition.





- New Patient


This patient is new to me today: Yes


Date on this admission: 17





- Critical Care


Critical Care patient: No

## 2017-03-16 NOTE — PN
Teaching Attending Note


Name of Resident: Dirk Mars


ATTENDING PHYSICIAN STATEMENT





I saw and evaluated the patient.


I reviewed the resident's note and discussed the case with the resident.


I agree with the resident's findings and plan as documented.





 Vital Signs











Temperature  102.8 F H  03/16/17 18:00


 


Pulse Rate  113 H  03/16/17 18:00


 


Respiratory Rate  20   03/16/17 18:00


 


Blood Pressure  102/70   03/16/17 18:00


 


O2 Sat by Pulse Oximetry (%)  95   03/16/17 19:50








 CMP











Sodium  136 mmol/L (136-145)   03/16/17  07:50    


 


Potassium  3.3 mmol/L (3.5-5.1)  L  03/16/17  07:50    


 


Chloride  97 mmol/L ()  L  03/16/17  07:50    


 


Carbon Dioxide  28 mmol/L (21-32)   03/16/17  07:50    


 


Anion Gap  11  (8-16)   03/16/17  07:50    


 


BUN  9 mg/dL (7-18)   03/16/17  07:50    


 


Creatinine  0.6 mg/dL (0.55-1.02)   03/16/17  07:50    


 


Creat Clearance w eGFR  > 60  (>60)   03/15/17  18:02    


 


Random Glucose  106 mg/dL ()   03/16/17  07:50    


 


Lactic Acid  1.306 mmol/L (0.4-2.0)   03/15/17  18:02    


 


Calcium  7.9 mg/dL (8.5-10.1)  L  03/16/17  07:50    


 


Total Bilirubin  0.8 mg/dL (0.2-1.0)  D 03/15/17  18:02    


 


AST  11 U/L (15-37)  L D 03/15/17  18:02    


 


ALT  13 U/L (12-78)  D 03/15/17  18:02    


 


Alkaline Phosphatase  79 U/L ()   03/15/17  18:02    


 


LD Total  158 U/L ()   03/16/17  07:50    


 


Creatine Kinase  41 IU/L ()   03/15/17  18:02    


 


Troponin I  < 0.02 ng/ml (0.00-0.05)   03/15/17  18:02    


 


C-Reactive Protein  21.5 MG/DL (0.00-0.3)  H  03/16/17  15:30    


 


Total Protein  7.4 g/dl (6.4-8.2)   03/15/17  18:02    


 


Albumin  3.0 g/dl (3.4-5.0)  L  03/16/17  07:50    








 Current Medications











Generic Name Dose Route Start Last Admin





  Trade Name Freq  PRN Reason Stop Dose Admin


 


Acetaminophen  650 mg 03/16/17 03:42 03/16/17 17:49





  Tylenol -  PO   650 mg





  Q6H PRN   Administration





  FEVER OR PAIN   


 


Amlodipine Besylate  10 mg 03/16/17 10:00 03/16/17 14:36





  Norvasc -  PO   10 mg





  DAILY ROBIN   Administration


 


Heparin Sodium (Porcine)  5,000 unit 03/16/17 22:00  





  Heparin -  SQ   





  TID ROBIN   


 


Hydrochlorothiazide  12.5 mg 03/16/17 11:00 03/16/17 14:36





  Hctz -  PO   12.5 mg





  DAILY ROBIN   Administration


 


Ceftriaxone Sodium  100 mls @ 200 mls/hr 03/16/17 15:00 03/16/17 17:48





  Rocephin 2gm Ivpb (Pre-Docked)  IVPB   200 mls/hr





  DAILY ROBIN   Administration


 


Azithromycin  250 mls @ 250 mls/hr 03/16/17 15:00 03/16/17 17:48





  Zithromax 500mg Ivpb (Pre-Docked)  IVPB   250 mls/hr





  DAILY ROBIN   Administration


 


Levothyroxine Sodium  125 mcg 03/16/17 07:00 03/16/17 06:40





  Synthroid -  PO   125 mcg





  DAILY@0700 ROBIN   Administration


 


Losartan Potassium  50 mg 03/16/17 10:00 03/16/17 14:36





  Cozaar -  PO   50 mg





  DAILY ROBIN   Administration


 


Morphine Sulfate  2 mg 03/16/17 03:14  





  Morphine Injection -  IVPUSH   





  Q6H PRN   





  PAIN   


 


Potassium Chloride  40 meq 03/16/17 22:00  





  K-Dur -  PO 03/17/17 10:01  





  BID [facility]   











 Home Medications











 Medication  Instructions  Recorded


 


Levothyroxine [Synthroid -] 125 mcg PO DAILY 09/27/14


 


Amlodipine Besylate [Norvasc -] 10 mg PO DAILY 03/15/17


 


Losartan/Hydrochlorothiazide 1 each PO DAILY 03/15/17





[Losartan-Hctz 50-12.5 mg Tab]  


 


Methocarbamol [Robaxin -] 500 mg PO DAILY PRN 03/15/17








 


:








ASSESSMENT AND PLAN:


Patient is a 57 y/o F w/PMH of HTN, hypothyroidism presented to ER w/ R sided 

chest/abdominal pain x 2 days. Found to have R pleural effusion and thought to 

have a liver mass.





# Acute Right pleural effusion s/p thoracentesis by IR, diagnostic purposes F/u 

pleural fluid studies 


CT of abdomen was reviewed and was reported as a liver mass, after reviewing 

the scans with radiology they do not feel they have seen a mass


LFTs WNL 





#Community acquired pneumonia On Rocephin and Zithromax as per ID





# Acute sepsis (leukocytosis and fever): after evaluation by ID it is thought 

she has a pneumonia with pleuretic chest pain. 





#HTN continue Home emds Norvasc Losartan and HCTZ





#Hypothyroidism Syntrhoid 125mcg qam 





DVT px; hEPARIN SQ, scd, EARLY AMBULATION

## 2017-03-16 NOTE — PN
Progress Note, Physician


Chief Complaint: 


ID








Well until 3 weeks ago when she developed severe right lat musculoskeletal man 

and went to [facility] and given muscle relaxant.


Last week seen [facility] and experiencing fever and chills.  Pain became so 

severe she asked to be brought here to ER.  Pleuritic type


pain without couph nightsweats abd pain NELLA weight loss.  Lives with her 

 Denies HIV risks exposures hobbies pets. Travel to  where she was 

born. Lives in  many years.  Has had herniorraphy and gallbladder out but not 

recent. NKA Nonsmoker No drugs


ROS noncontributory  No couph or SOB








- Current Medication List


Current Medications: 


Active Medications





Acetaminophen (Tylenol -)  650 mg PO Q6H PRN


   PRN Reason: FEVER OR PAIN


   Last Admin: 03/16/17 09:29 Dose:  650 mg


Amlodipine Besylate (Norvasc -)  10 mg PO DAILY [facility]


Heparin Sodium (Porcine) (Heparin -)  5,000 unit SQ TID [facility]


Hydrochlorothiazide (Hctz -)  12.5 mg PO DAILY [facility]


Sodium Chloride (Normal Saline -)  1,000 mls @ 75 mls/hr IV ASDIR [facility]


   Last Admin: 03/16/17 06:47 Dose:  75 mls/hr


Levothyroxine Sodium (Synthroid -)  125 mcg PO DAILY@0700 [facility]


   Last Admin: 03/16/17 06:40 Dose:  125 mcg


Losartan Potassium (Cozaar -)  50 mg PO DAILY [facility]


Morphine Sulfate (Morphine Injection -)  2 mg IVPUSH Q6H PRN


   PRN Reason: PAIN


Potassium Chloride (K-Dur -)  40 meq PO BID [facility]


   Stop: 03/17/17 10:01











- Objective


Vital Signs: 


 Vital Signs











Temperature  102 F H  03/16/17 09:00


 


Pulse Rate  117 H  03/16/17 09:00


 


Respiratory Rate  18   03/16/17 09:00


 


Blood Pressure  152/64   03/16/17 09:00


 


O2 Sat by Pulse Oximetry (%)  92 L  03/16/17 09:00











Constitutional: Yes: Obese


Eyes: Yes: WNL, Conjunctiva Clear, PERRL


HENT: Yes: WNL, Atraumatic


Neck: Yes: WNL, Supple.  No: Lymphadenopathy


Cardiovascular: Yes: Regular Rate and Rhythm, S1, S2.  No: Murmur, Rub


Respiratory: Yes: WNL, Regular, CTA Bilaterally, Other (Dullness to percussion 

Diminished BS right lung)


Gastrointestinal: Yes: WNL, Normal Bowel Sounds, Soft.  No: Tenderness


Edema: No


Labs: 


 CBC, BMP





 03/16/17 07:50 





 03/16/17 07:50 





 INR, PTT











INR  1.54  (0.82-1.09)  H  03/16/17  08:05    


 


Fibrinogen  512.0 mg/dL (238-498)  H  03/16/17  08:05    














Assessment/Plan


Microbiology








 Laboratory Tests











  03/15/17 03/16/17





  22:50 07:50


 


WBC  14.5 H D  9.8  D


 


Hgb  11.2  D  11.8


 


Plt Count  332  333


 


Neutrophils %  88.3 H D 


 


Lymphocytes %  4.6 L D 


 


Monocytes %  6.2 


 


Eosinophils %  0.4  D 


 


Basophils %  0.5 








Assessment  Fever chills severe pleuritic chest pain with moderate right 

effusion. Fluid aspirated Dr Pond was cloudy but not purulent.  We asked for 

a stat gram stain few polys NOS.  Contrast CT no infiltrate





Plan


Treat for community acquired PNA/parapneumonic effusions


Ceftriaxone Azithromycin


ESR CRP


HIV test


Fluid for AFB FUNGUS C/S glucose protein LDH cytology WBC diff


                                                protein

## 2017-03-17 LAB
ALBUMIN SERPL-MCNC: 2.7 G/DL (ref 3.4–5)
ALP SERPL-CCNC: 74 U/L (ref 45–117)
ALT SERPL-CCNC: 12 U/L (ref 12–78)
ANION GAP SERPL CALC-SCNC: 13 MMOL/L (ref 8–16)
APPEARANCE UR: CLEAR
AST SERPL-CCNC: 12 U/L (ref 15–37)
BACTERIA #/AREA URNS HPF: (no result) /HPF
BASOPHILS # BLD: 0.4 % (ref 0–2)
BILIRUB SERPL-MCNC: 0.6 MG/DL (ref 0.2–1)
BILIRUB UR STRIP.AUTO-MCNC: NEGATIVE MG/DL
CALCIUM SERPL-MCNC: 7.8 MG/DL (ref 8.5–10.1)
CO2 SERPL-SCNC: 27 MMOL/L (ref 21–32)
COLOR UR: (no result)
CREAT SERPL-MCNC: 0.5 MG/DL (ref 0.55–1.02)
DEPRECATED RDW RBC AUTO: 13.9 % (ref 11.6–15.6)
EOSINOPHIL # BLD: 1.9 % (ref 0–4.5)
GLUCOSE SERPL-MCNC: 116 MG/DL (ref 74–106)
HIV 1 & 2 AB: NEGATIVE
HIV 1 AGP24: NEGATIVE
KETONES UR QL STRIP: NEGATIVE
LEUKOCYTE ESTERASE UR QL STRIP.AUTO: (no result)
MCH RBC QN AUTO: 28.9 PG (ref 25.7–33.7)
MCHC RBC AUTO-ENTMCNC: 34.1 G/DL (ref 32–36)
MCV RBC: 84.5 FL (ref 80–96)
MUCOUS THREADS URNS QL MICRO: (no result)
NEUTROPHILS # BLD: 77.2 % (ref 42.8–82.8)
NITRITE UR QL STRIP: NEGATIVE
PH UR: 6 [PH] (ref 5–8)
PLATELET # BLD AUTO: 330 K/MM3 (ref 134–434)
PMV BLD: 7.7 FL (ref 7.5–11.1)
PROT SERPL-MCNC: 6.6 G/DL (ref 6.4–8.2)
PROT UR QL STRIP: NEGATIVE
PROT UR QL STRIP: NEGATIVE
RBC # BLD AUTO: 3 /HPF (ref 0–3)
RBC # UR STRIP: (no result) /UL
SP GR UR: 1 (ref 1–1.03)
UROBILINOGEN UR STRIP-MCNC: NEGATIVE E.U./DL (ref 0.2–1)
WBC # BLD AUTO: 9 K/MM3 (ref 4–10)
WBC # UR AUTO: 23 /HPF (ref 3–5)

## 2017-03-17 RX ADMIN — POTASSIUM CHLORIDE SCH MEQ: 1500 TABLET, EXTENDED RELEASE ORAL at 22:17

## 2017-03-17 RX ADMIN — ACETAMINOPHEN PRN MG: 325 TABLET ORAL at 14:35

## 2017-03-17 RX ADMIN — AZITHROMYCIN DIHYDRATE SCH MLS/HR: 500 INJECTION, POWDER, LYOPHILIZED, FOR SOLUTION INTRAVENOUS at 10:21

## 2017-03-17 RX ADMIN — ACETAMINOPHEN PRN MG: 325 TABLET ORAL at 05:47

## 2017-03-17 RX ADMIN — LEVOTHYROXINE SODIUM SCH MCG: 125 TABLET ORAL at 06:23

## 2017-03-17 RX ADMIN — HEPARIN SODIUM SCH UNIT: 5000 INJECTION, SOLUTION INTRAVENOUS; SUBCUTANEOUS at 06:23

## 2017-03-17 RX ADMIN — LOSARTAN POTASSIUM SCH MG: 50 TABLET, FILM COATED ORAL at 10:22

## 2017-03-17 RX ADMIN — AMLODIPINE BESYLATE SCH MG: 10 TABLET ORAL at 10:22

## 2017-03-17 RX ADMIN — POTASSIUM CHLORIDE SCH: 1500 TABLET, EXTENDED RELEASE ORAL at 12:38

## 2017-03-17 RX ADMIN — CEFTRIAXONE SODIUM SCH MLS/HR: 2 INJECTION, POWDER, FOR SOLUTION INTRAMUSCULAR; INTRAVENOUS at 10:21

## 2017-03-17 RX ADMIN — ACETAMINOPHEN PRN MG: 325 TABLET ORAL at 22:16

## 2017-03-17 RX ADMIN — HYDROCHLOROTHIAZIDE SCH MG: 12.5 CAPSULE ORAL at 10:22

## 2017-03-17 RX ADMIN — POTASSIUM CHLORIDE SCH MEQ: 1500 TABLET, EXTENDED RELEASE ORAL at 10:22

## 2017-03-17 RX ADMIN — HEPARIN SODIUM SCH UNIT: 5000 INJECTION, SOLUTION INTRAVENOUS; SUBCUTANEOUS at 14:35

## 2017-03-17 RX ADMIN — HEPARIN SODIUM SCH UNIT: 5000 INJECTION, SOLUTION INTRAVENOUS; SUBCUTANEOUS at 22:16

## 2017-03-17 NOTE — PN
Progress Note, Physician


Chief Complaint: 


ID





Ceftriaxone Azithromcyin day 2





Chest pain lat chest better today NSAIA





- Current Medication List


Current Medications: 


Active Medications





Acetaminophen (Tylenol -)  650 mg PO Q6H PRN


   PRN Reason: FEVER OR PAIN


   Last Admin: 03/17/17 05:47 Dose:  650 mg


Amlodipine Besylate (Norvasc -)  10 mg PO DAILY [facility]


   Last Admin: 03/17/17 10:22 Dose:  10 mg


Heparin Sodium (Porcine) (Heparin -)  5,000 unit SQ TID [facility]


   Last Admin: 03/17/17 06:23 Dose:  5,000 unit


Hydrochlorothiazide (Hctz -)  12.5 mg PO DAILY [facility]


   Last Admin: 03/17/17 10:22 Dose:  12.5 mg


Ceftriaxone Sodium (Rocephin 2gm Ivpb (Pre-Docked))  100 mls @ 200 mls/hr IVPB 

DAILY [facility]


   Last Admin: 03/17/17 10:21 Dose:  200 mls/hr


Azithromycin (Zithromax 500mg Ivpb (Pre-Docked))  250 mls @ 250 mls/hr IVPB 

DAILY [facility]


   Last Admin: 03/17/17 10:21 Dose:  250 mls/hr


Levothyroxine Sodium (Synthroid -)  125 mcg PO DAILY@0700 [facility]


   Last Admin: 03/17/17 06:23 Dose:  125 mcg


Losartan Potassium (Cozaar -)  50 mg PO DAILY [facility]


   Last Admin: 03/17/17 10:22 Dose:  50 mg


Morphine Sulfate (Morphine Injection -)  2 mg IVPUSH Q6H PRN


   PRN Reason: PAIN


Potassium Chloride (K-Dur -)  40 meq PO BID [facility]


   Stop: 03/17/17 22:01











- Objective


Vital Signs: 


 Vital Signs











Temperature  100.2 F H  03/17/17 06:43


 


Pulse Rate  100 H  03/17/17 06:40


 


Respiratory Rate  20   03/17/17 06:40


 


Blood Pressure  122/74   03/17/17 06:40


 


O2 Sat by Pulse Oximetry (%)  95   03/16/17 19:50











Constitutional: Yes: Well Nourished, No Distress


Neck: Yes: WNL, Supple


Cardiovascular: Yes: Regular Rate and Rhythm, S1, S2


Respiratory: Yes: WNL, Regular, CTA Bilaterally, Diminished, Dullness


Gastrointestinal: Yes: Soft.  No: Tenderness


Edema: No


Labs: 


 CBC, BMP





 03/17/17 07:00 





 03/17/17 07:00 





 INR, PTT











INR  1.54  (0.82-1.09)  H  03/16/17  08:05    


 


Fibrinogen  512.0 mg/dL (238-498)  H  03/16/17  08:05    














Assessment/Plan


Microbiology





03/15/17 18:02   Blood - Peripheral Venous   Blood Culture - Preliminary


                              NO GROWTH OBTAINED AFTER 24 HOURS, INCUBATION TO 

CONTINUE


                              FOR 4 DAYS.


03/15/17 18:02   Blood - Peripheral Venous   Blood Culture - Preliminary


                              NO GROWTH OBTAINED AFTER 24 HOURS, INCUBATION TO 

CONTINUE


                              FOR 4 DAYS.





 Laboratory Tests











  03/16/17 03/16/17 03/16/17





  11:25 15:15 18:30


 


WBC   


 


Hgb   


 


Hct   


 


Plt Count   


 


ESR   


 


Creatinine   


 


Total Bilirubin   


 


ALT   


 


Ur Leukocyte Esterase   


 


Urine WBC   


 


Pleural Fluid Source  Right pleural  


 


Pleural Color  Cloudy  


 


Pleural Appearance  Cloudy  


 


Pleural pH    7.43


 


Pleural WBC  4280  


 


Pleural RBC  6843  


 


Pleural Neutrophils  35  


 


Pleural Lymphocytes  30  


 


Pleural Monocytes  20  


 


Pleural Eosinophils  15  


 


Pleural Chloride  99.39  


 


Pleural Total Protein  5.1  


 


Pleural LDH  961.94  


 


Pleural Glucose  68.324  


 


JAMA Screen   


 


HIV 1&2 Antibody Screen   Negative 


 


HIV P24 Antigen   Negative 














  03/17/17 03/17/17 03/17/17





  04:00 07:00 07:00


 


WBC   


 


Hgb   


 


Hct   


 


Plt Count   


 


ESR   15 


 


Creatinine   


 


Total Bilirubin   


 


ALT   


 


Ur Leukocyte Esterase  3+ H  


 


Urine WBC  23  


 


Pleural Fluid Source   


 


Pleural Color   


 


Pleural Appearance   


 


Pleural pH   


 


Pleural WBC   


 


Pleural RBC   


 


Pleural Neutrophils   


 


Pleural Lymphocytes   


 


Pleural Monocytes   


 


Pleural Eosinophils   


 


Pleural Chloride   


 


Pleural Total Protein   


 


Pleural LDH   


 


Pleural Glucose   


 


JAMA Screen    Pending


 


HIV 1&2 Antibody Screen   


 


HIV P24 Antigen   














  03/17/17 03/17/17





  07:00 07:00


 


WBC  9.0 


 


Hgb  11.0 


 


Hct  32.2 L 


 


Plt Count  330 


 


ESR  


 


Creatinine   0.5 L


 


Total Bilirubin   0.6  D


 


ALT   12


 


Ur Leukocyte Esterase  


 


Urine WBC  


 


Pleural Fluid Source  


 


Pleural Color  


 


Pleural Appearance  


 


Pleural pH  


 


Pleural WBC  


 


Pleural RBC  


 


Pleural Neutrophils  


 


Pleural Lymphocytes  


 


Pleural Monocytes  


 


Pleural Eosinophils  


 


Pleural Chloride  


 


Pleural Total Protein  


 


Pleural LDH  


 


Pleural Glucose  


 


JAMA Screen  


 


HIV 1&2 Antibody Screen  


 


HIV P24 Antigen  








Assessment Parapneumonic effusion/ Fever  better today  Fluid likely sterile as 

often the case CRP 25





Plan                Continue current antibiotics


                        Agree pulmonary consult needed


                        Await c/s  JAMA though collagen disease unlikely


Dayna EASTMAN

## 2017-03-17 NOTE — PATH
Cytology Non-Gynecological Report



Patient Name:  ISMAEL LAY

Accession #:  [id number]

[facility]. Rec. #:  [id number]                                                        

   /Age/Gender:  1960 (Age: 56) / F

Account:  [id number]                                                          

             Location: [address] [place] [address]

Taken:  3/16/2017

Received:  3/16/2017

Reported:  3/17/2017

Physicians:  Ella Berg M.D.

  



Specimen(s) Received

 PLEURAL FLUID RIGHT 





Clinical History

Pleural







Final Diagnosis

PLEURAL FLUID, RIGHT, THORACENTESIS:

SATISFACTORY FOR EVALUATION

BENIGN (NO MALIGNANT CELLS IDENTIFIED)

ABUNDANT NEUTROPHILS AND SCANT BENIGN MESOTHELIAL CELLS PRESENT



Comment:  Recommend correlation with clinical findings and follow up as

clinically indicated.





***Electronically Signed***

Darren [place] M.D.





Gross Description

A.  Approximately 50 cc of yellow fluid received fixed in 50% alcohol.  One

cytofunnel and one cellblock prepared.

B.  Approximately 1000 cc of yellow fluid received fresh.  One cytofunnel and

one cellblock prepared.

## 2017-03-17 NOTE — PN
Physical Exam: 


SUBJECTIVE: Patient seen and examined at bedside 


states she feels better today 


denies shortness of breath or chest pain 


still with flank and abdominal pain but much better per patient 








OBJECTIVE:





 Vital Signs











 Period  Temp  Pulse  Resp  BP Sys/Olivares  Pulse Ox


 


 Last 24 Hr  98.6 F-102.8 F    18-20  102-142/66-74  95











GENERAL: The patient is awake, alert, and fully oriented, in no acute distress. 


HEAD: Normal with no signs of trauma. 


NECK:  supple. 


LUNGS: Breath sounds equal, clear to auscultation bilaterally, no wheezes, no 

crackles, no 


accessory muscle use. 


HEART: Regular rate and rhythm, S1, S2 without murmur, rub or gallop.


ABDOMEN: mild right sided abdominal tenderness-improved still significant right 

flank tenderness- but slightly improved per patient no chest tenderness today. 

ABD soft non distended 


NEUROLOGICAL: Cranial nerves II through XII grossly intact. Normal speech


SKIN: warm and clammy. diaphoretic 


PSYCH: Normal mood, normal affect.

















 Laboratory Results - last 24 hr











  03/16/17 03/16/17 03/16/17





  07:50 08:05 11:25


 


WBC   


 


RBC   


 


Hgb   


 


Hct   


 


MCV   


 


MCHC   


 


RDW   


 


Plt Count   


 


MPV   


 


Neutrophils %   


 


Lymphocytes %   


 


Monocytes %   


 


Eosinophils %   


 


Basophils %   


 


ESR   


 


Sodium   


 


Potassium   


 


Chloride   


 


Carbon Dioxide   


 


Anion Gap   


 


BUN   


 


Creatinine   


 


Creat Clearance w eGFR   


 


Random Glucose   


 


Calcium   


 


Total Bilirubin   


 


AST   


 


ALT   


 


Alkaline Phosphatase   


 


C-Reactive Protein   


 


Total Protein   


 


Albumin   


 


Tumor Marker AFP   1.5 


 


Urine Color   


 


Urine Appearance   


 


Urine pH   


 


Ur Specific Gravity   


 


Urine Protein   


 


Urine Glucose (UA)   


 


Urine Ketones   


 


Urine Blood   


 


Urine Nitrite   


 


Urine Bilirubin   


 


Urine Urobilinogen   


 


Ur Leukocyte Esterase   


 


Urine RBC   


 


Urine WBC   


 


Ur Epithelial Cells   


 


Urine Bacteria   


 


Urine Mucus   


 


Pleural Fluid Source    Right pleural


 


Pleural Color    Cloudy


 


Pleural Appearance    Cloudy


 


Pleural pH   


 


Pleural WBC    4280


 


Pleural RBC    6843


 


Pleural Neutrophils    35


 


Pleural Lymphocytes    30


 


Pleural Monocytes    20


 


Pleural Eosinophils    15


 


Pleural Chloride    99.39


 


Pleural Total Protein    5.1


 


Pleural Albumin    2


 


Pleural LDH    961.94


 


Pleural Glucose    68.324


 


Pleural Amylase    19.762


 


Pleural Cholesterol    83


 


Pleural Triglycerides    35


 


Hepatitis C Antibody  0.3  


 


HIV 1&2 Antibody Screen   


 


HIV P24 Antigen   














  03/16/17 03/16/17 03/16/17





  15:15 15:30 18:30


 


WBC   


 


RBC   


 


Hgb   


 


Hct   


 


MCV   


 


MCHC   


 


RDW   


 


Plt Count   


 


MPV   


 


Neutrophils %   


 


Lymphocytes %   


 


Monocytes %   


 


Eosinophils %   


 


Basophils %   


 


ESR   


 


Sodium   


 


Potassium   


 


Chloride   


 


Carbon Dioxide   


 


Anion Gap   


 


BUN   


 


Creatinine   


 


Creat Clearance w eGFR   


 


Random Glucose   


 


Calcium   


 


Total Bilirubin   


 


AST   


 


ALT   


 


Alkaline Phosphatase   


 


C-Reactive Protein   21.5 H 


 


Total Protein   


 


Albumin   


 


Tumor Marker AFP   


 


Urine Color   


 


Urine Appearance   


 


Urine pH   


 


Ur Specific Gravity   


 


Urine Protein   


 


Urine Glucose (UA)   


 


Urine Ketones   


 


Urine Blood   


 


Urine Nitrite   


 


Urine Bilirubin   


 


Urine Urobilinogen   


 


Ur Leukocyte Esterase   


 


Urine RBC   


 


Urine WBC   


 


Ur Epithelial Cells   


 


Urine Bacteria   


 


Urine Mucus   


 


Pleural Fluid Source   


 


Pleural Color   


 


Pleural Appearance   


 


Pleural pH    7.43


 


Pleural WBC   


 


Pleural RBC   


 


Pleural Neutrophils   


 


Pleural Lymphocytes   


 


Pleural Monocytes   


 


Pleural Eosinophils   


 


Pleural Chloride   


 


Pleural Total Protein   


 


Pleural Albumin   


 


Pleural LDH   


 


Pleural Glucose   


 


Pleural Amylase   


 


Pleural Cholesterol   


 


Pleural Triglycerides   


 


Hepatitis C Antibody   


 


HIV 1&2 Antibody Screen  Negative  


 


HIV P24 Antigen  Negative  














  03/17/17 03/17/17 03/17/17





  04:00 07:00 07:00


 


WBC    9.0


 


RBC    3.82


 


Hgb    11.0


 


Hct    32.2 L


 


MCV    84.5


 


MCHC    34.1


 


RDW    13.9


 


Plt Count    330


 


MPV    7.7


 


Neutrophils %    77.2


 


Lymphocytes %    9.5  D


 


Monocytes %    11.0 H


 


Eosinophils %    1.9


 


Basophils %    0.4


 


ESR   15 


 


Sodium   


 


Potassium   


 


Chloride   


 


Carbon Dioxide   


 


Anion Gap   


 


BUN   


 


Creatinine   


 


Creat Clearance w eGFR   


 


Random Glucose   


 


Calcium   


 


Total Bilirubin   


 


AST   


 


ALT   


 


Alkaline Phosphatase   


 


C-Reactive Protein   


 


Total Protein   


 


Albumin   


 


Tumor Marker AFP   


 


Urine Color  Straw  


 


Urine Appearance  Clear  


 


Urine pH  6.0  


 


Ur Specific Gravity  1.004  


 


Urine Protein  Negative  


 


Urine Glucose (UA)  Negative  


 


Urine Ketones  Negative  


 


Urine Blood  2+ H  


 


Urine Nitrite  Negative  


 


Urine Bilirubin  Negative  


 


Urine Urobilinogen  Negative  


 


Ur Leukocyte Esterase  3+ H  


 


Urine RBC  3  


 


Urine WBC  23  


 


Ur Epithelial Cells  Few  


 


Urine Bacteria  Rare  


 


Urine Mucus  Rare  


 


Pleural Fluid Source   


 


Pleural Color   


 


Pleural Appearance   


 


Pleural pH   


 


Pleural WBC   


 


Pleural RBC   


 


Pleural Neutrophils   


 


Pleural Lymphocytes   


 


Pleural Monocytes   


 


Pleural Eosinophils   


 


Pleural Chloride   


 


Pleural Total Protein   


 


Pleural Albumin   


 


Pleural LDH   


 


Pleural Glucose   


 


Pleural Amylase   


 


Pleural Cholesterol   


 


Pleural Triglycerides   


 


Hepatitis C Antibody   


 


HIV 1&2 Antibody Screen   


 


HIV P24 Antigen   














  03/17/17





  07:00


 


WBC 


 


RBC 


 


Hgb 


 


Hct 


 


MCV 


 


MCHC 


 


RDW 


 


Plt Count 


 


MPV 


 


Neutrophils % 


 


Lymphocytes % 


 


Monocytes % 


 


Eosinophils % 


 


Basophils % 


 


ESR 


 


Sodium  135 L


 


Potassium  3.5


 


Chloride  95 L


 


Carbon Dioxide  27


 


Anion Gap  13


 


BUN  7  D


 


Creatinine  0.5 L


 


Creat Clearance w eGFR  > 60


 


Random Glucose  116 H


 


Calcium  7.8 L


 


Total Bilirubin  0.6  D


 


AST  12 L


 


ALT  12


 


Alkaline Phosphatase  74


 


C-Reactive Protein 


 


Total Protein  6.6


 


Albumin  2.7 L


 


Tumor Marker AFP 


 


Urine Color 


 


Urine Appearance 


 


Urine pH 


 


Ur Specific Gravity 


 


Urine Protein 


 


Urine Glucose (UA) 


 


Urine Ketones 


 


Urine Blood 


 


Urine Nitrite 


 


Urine Bilirubin 


 


Urine Urobilinogen 


 


Ur Leukocyte Esterase 


 


Urine RBC 


 


Urine WBC 


 


Ur Epithelial Cells 


 


Urine Bacteria 


 


Urine Mucus 


 


Pleural Fluid Source 


 


Pleural Color 


 


Pleural Appearance 


 


Pleural pH 


 


Pleural WBC 


 


Pleural RBC 


 


Pleural Neutrophils 


 


Pleural Lymphocytes 


 


Pleural Monocytes 


 


Pleural Eosinophils 


 


Pleural Chloride 


 


Pleural Total Protein 


 


Pleural Albumin 


 


Pleural LDH 


 


Pleural Glucose 


 


Pleural Amylase 


 


Pleural Cholesterol 


 


Pleural Triglycerides 


 


Hepatitis C Antibody 


 


HIV 1&2 Antibody Screen 


 


HIV P24 Antigen 








Active Medications











Generic Name Dose Route Start Last Admin





  Trade Name Freq  PRN Reason Stop Dose Admin


 


Acetaminophen  650 mg 03/16/17 03:42 03/17/17 05:47





  Tylenol -  PO   650 mg





  Q6H PRN   Administration





  FEVER OR PAIN   


 


Amlodipine Besylate  10 mg 03/16/17 10:00 03/17/17 10:22





  Norvasc -  PO   10 mg





  DAILY ROBIN   Administration


 


Heparin Sodium (Porcine)  5,000 unit 03/16/17 22:00 03/17/17 06:23





  Heparin -  SQ   5,000 unit





  TID ROBIN   Administration


 


Hydrochlorothiazide  12.5 mg 03/16/17 11:00 03/17/17 10:22





  Hctz -  PO   12.5 mg





  DAILY ROBIN   Administration


 


Ceftriaxone Sodium  100 mls @ 200 mls/hr 03/16/17 15:00 03/17/17 10:21





  Rocephin 2gm Ivpb (Pre-Docked)  IVPB   200 mls/hr





  DAILY ROBIN   Administration


 


Azithromycin  250 mls @ 250 mls/hr 03/16/17 15:00 03/17/17 10:21





  Zithromax 500mg Ivpb (Pre-Docked)  IVPB   250 mls/hr





  DAILY ROBIN   Administration


 


Levothyroxine Sodium  125 mcg 03/16/17 07:00 03/17/17 06:23





  Synthroid -  PO   125 mcg





  DAILY@0700 ROBIN   Administration


 


Losartan Potassium  50 mg 03/16/17 10:00 03/17/17 10:22





  Cozaar -  PO   50 mg





  DAILY ROBIN   Administration


 


Morphine Sulfate  2 mg 03/16/17 03:14  





  Morphine Injection -  IVPUSH   





  Q6H PRN   





  PAIN   








 Laboratory Results - last 24 hr











  03/16/17 03/16/17 03/16/17





  07:50 08:05 11:25


 


WBC   


 


RBC   


 


Hgb   


 


Hct   


 


MCV   


 


MCHC   


 


RDW   


 


Plt Count   


 


MPV   


 


Neutrophils %   


 


Lymphocytes %   


 


Monocytes %   


 


Eosinophils %   


 


Basophils %   


 


ESR   


 


Sodium   


 


Potassium   


 


Chloride   


 


Carbon Dioxide   


 


Anion Gap   


 


BUN   


 


Creatinine   


 


Creat Clearance w eGFR   


 


Random Glucose   


 


Calcium   


 


Total Bilirubin   


 


AST   


 


ALT   


 


Alkaline Phosphatase   


 


C-Reactive Protein   


 


Total Protein   


 


Albumin   


 


Tumor Marker AFP   1.5 


 


Urine Color   


 


Urine Appearance   


 


Urine pH   


 


Ur Specific Gravity   


 


Urine Protein   


 


Urine Glucose (UA)   


 


Urine Ketones   


 


Urine Blood   


 


Urine Nitrite   


 


Urine Bilirubin   


 


Urine Urobilinogen   


 


Ur Leukocyte Esterase   


 


Urine RBC   


 


Urine WBC   


 


Ur Epithelial Cells   


 


Urine Bacteria   


 


Urine Mucus   


 


Pleural Fluid Source    Right pleural


 


Pleural Color    Cloudy


 


Pleural Appearance    Cloudy


 


Pleural pH   


 


Pleural WBC    4280


 


Pleural RBC    6843


 


Pleural Neutrophils    35


 


Pleural Lymphocytes    30


 


Pleural Monocytes    20


 


Pleural Eosinophils    15


 


Pleural Chloride    99.39


 


Pleural Total Protein    5.1


 


Pleural Albumin    2


 


Pleural LDH    961.94


 


Pleural Glucose    68.324


 


Pleural Amylase    19.762


 


Pleural Cholesterol    83


 


Pleural Triglycerides    35


 


Hepatitis C Antibody  0.3  


 


HIV 1&2 Antibody Screen   


 


HIV P24 Antigen   














  03/16/17 03/16/17 03/16/17





  15:15 15:30 18:30


 


WBC   


 


RBC   


 


Hgb   


 


Hct   


 


MCV   


 


MCHC   


 


RDW   


 


Plt Count   


 


MPV   


 


Neutrophils %   


 


Lymphocytes %   


 


Monocytes %   


 


Eosinophils %   


 


Basophils %   


 


ESR   


 


Sodium   


 


Potassium   


 


Chloride   


 


Carbon Dioxide   


 


Anion Gap   


 


BUN   


 


Creatinine   


 


Creat Clearance w eGFR   


 


Random Glucose   


 


Calcium   


 


Total Bilirubin   


 


AST   


 


ALT   


 


Alkaline Phosphatase   


 


C-Reactive Protein   21.5 H 


 


Total Protein   


 


Albumin   


 


Tumor Marker AFP   


 


Urine Color   


 


Urine Appearance   


 


Urine pH   


 


Ur Specific Gravity   


 


Urine Protein   


 


Urine Glucose (UA)   


 


Urine Ketones   


 


Urine Blood   


 


Urine Nitrite   


 


Urine Bilirubin   


 


Urine Urobilinogen   


 


Ur Leukocyte Esterase   


 


Urine RBC   


 


Urine WBC   


 


Ur Epithelial Cells   


 


Urine Bacteria   


 


Urine Mucus   


 


Pleural Fluid Source   


 


Pleural Color   


 


Pleural Appearance   


 


Pleural pH    7.43


 


Pleural WBC   


 


Pleural RBC   


 


Pleural Neutrophils   


 


Pleural Lymphocytes   


 


Pleural Monocytes   


 


Pleural Eosinophils   


 


Pleural Chloride   


 


Pleural Total Protein   


 


Pleural Albumin   


 


Pleural LDH   


 


Pleural Glucose   


 


Pleural Amylase   


 


Pleural Cholesterol   


 


Pleural Triglycerides   


 


Hepatitis C Antibody   


 


HIV 1&2 Antibody Screen  Negative  


 


HIV P24 Antigen  Negative  














  03/17/17 03/17/17 03/17/17





  04:00 07:00 07:00


 


WBC    9.0


 


RBC    3.82


 


Hgb    11.0


 


Hct    32.2 L


 


MCV    84.5


 


MCHC    34.1


 


RDW    13.9


 


Plt Count    330


 


MPV    7.7


 


Neutrophils %    77.2


 


Lymphocytes %    9.5  D


 


Monocytes %    11.0 H


 


Eosinophils %    1.9


 


Basophils %    0.4


 


ESR   15 


 


Sodium   


 


Potassium   


 


Chloride   


 


Carbon Dioxide   


 


Anion Gap   


 


BUN   


 


Creatinine   


 


Creat Clearance w eGFR   


 


Random Glucose   


 


Calcium   


 


Total Bilirubin   


 


AST   


 


ALT   


 


Alkaline Phosphatase   


 


C-Reactive Protein   


 


Total Protein   


 


Albumin   


 


Tumor Marker AFP   


 


Urine Color  Straw  


 


Urine Appearance  Clear  


 


Urine pH  6.0  


 


Ur Specific Gravity  1.004  


 


Urine Protein  Negative  


 


Urine Glucose (UA)  Negative  


 


Urine Ketones  Negative  


 


Urine Blood  2+ H  


 


Urine Nitrite  Negative  


 


Urine Bilirubin  Negative  


 


Urine Urobilinogen  Negative  


 


Ur Leukocyte Esterase  3+ H  


 


Urine RBC  3  


 


Urine WBC  23  


 


Ur Epithelial Cells  Few  


 


Urine Bacteria  Rare  


 


Urine Mucus  Rare  


 


Pleural Fluid Source   


 


Pleural Color   


 


Pleural Appearance   


 


Pleural pH   


 


Pleural WBC   


 


Pleural RBC   


 


Pleural Neutrophils   


 


Pleural Lymphocytes   


 


Pleural Monocytes   


 


Pleural Eosinophils   


 


Pleural Chloride   


 


Pleural Total Protein   


 


Pleural Albumin   


 


Pleural LDH   


 


Pleural Glucose   


 


Pleural Amylase   


 


Pleural Cholesterol   


 


Pleural Triglycerides   


 


Hepatitis C Antibody   


 


HIV 1&2 Antibody Screen   


 


HIV P24 Antigen   














  03/17/17





  07:00


 


WBC 


 


RBC 


 


Hgb 


 


Hct 


 


MCV 


 


MCHC 


 


RDW 


 


Plt Count 


 


MPV 


 


Neutrophils % 


 


Lymphocytes % 


 


Monocytes % 


 


Eosinophils % 


 


Basophils % 


 


ESR 


 


Sodium  135 L


 


Potassium  3.5


 


Chloride  95 L


 


Carbon Dioxide  27


 


Anion Gap  13


 


BUN  7  D


 


Creatinine  0.5 L


 


Creat Clearance w eGFR  > 60


 


Random Glucose  116 H


 


Calcium  7.8 L


 


Total Bilirubin  0.6  D


 


AST  12 L


 


ALT  12


 


Alkaline Phosphatase  74


 


C-Reactive Protein 


 


Total Protein  6.6


 


Albumin  2.7 L


 


Tumor Marker AFP 


 


Urine Color 


 


Urine Appearance 


 


Urine pH 


 


Ur Specific Gravity 


 


Urine Protein 


 


Urine Glucose (UA) 


 


Urine Ketones 


 


Urine Blood 


 


Urine Nitrite 


 


Urine Bilirubin 


 


Urine Urobilinogen 


 


Ur Leukocyte Esterase 


 


Urine RBC 


 


Urine WBC 


 


Ur Epithelial Cells 


 


Urine Bacteria 


 


Urine Mucus 


 


Pleural Fluid Source 


 


Pleural Color 


 


Pleural Appearance 


 


Pleural pH 


 


Pleural WBC 


 


Pleural RBC 


 


Pleural Neutrophils 


 


Pleural Lymphocytes 


 


Pleural Monocytes 


 


Pleural Eosinophils 


 


Pleural Chloride 


 


Pleural Total Protein 


 


Pleural Albumin 


 


Pleural LDH 


 


Pleural Glucose 


 


Pleural Amylase 


 


Pleural Cholesterol 


 


Pleural Triglycerides 


 


Hepatitis C Antibody 


 


HIV 1&2 Antibody Screen 


 


HIV P24 Antigen 











ASSESSMENT/PLAN:


57 y/o F w/PMH of HTN, hypothyroidism presented to ER w/ R sided chest/

abdominal pain x 2 days. Found to have R pleural effusion and thought to have a 

new liver mass lesion.





Right pleural effusion/Community acquired pneumonia/Sepsis (leukocytosis and 

fever): community acquired pneumonia with parapneumonic effusion with pleuritic 

chest pain. 


s/p thoracentesis 


ID consult appreciated 


Azithromycin and Ceftriaxone day 2 


pleural fluid studies-shows it is exudative likely parapneuomonic effusion


patient is still febrile and tachycardic 


f/u pleural fluid microbiology gram stain did not show any organisms 


pulmonary consulted will follow up  





Hepatic mass: this was seen on chest CT also read as a mass on abdominal CT 

after reviewing the scans with radiology they do not feel there is a mass in 

the liver. it is likely part of the abdominal wall vs a dropped stone from her 

cholecystectomy 


LFTs WNL 


will see if right flank/chest/abdominal pain improves with treatment of 

pneumonia-so far it is 


If patient is still having fevers chilsl and does not improve in the next 3 

days or so an upper abdominal MRI with gadolinium may be of value per Dr. Burks 





HTN


continue Home meds Norvasc Losartan and HCTZ





Hypothyroidism


Synthroid 125mcg qam 





-FEN


no IVF 


hypokalemia will replete 40meg po x2 doses and recheck tomorrow 


Low sodium diet 





PPx:


HSQ/SCDs


No criteria met for GI PPx


no PT evaluation needed 





HLOC  





Visit type





- Emergency Visit


Emergency Visit: Yes


ED Registration Date: 03/15/17


Care time: The patient presented to the Emergency Department on the above date 

and was hospitalized for further evaluation of their emergent condition.





- New Patient


This patient is new to me today: No





- Critical Care


Critical Care patient: No “You can access the Follow[facility] Patient Portal, offered by [facility], by registering with the following website: [email or web address]”

## 2017-03-17 NOTE — CON.PULM
Consult


Consult Specialty:: PULMONARY


Referred by:: INDIRA


Reason for Consultation:: PLEURAL EFFUSION





- History of Present Illness


Chief Complaint: SOB/FEVER/RIGHT SIDED CHEST PAIN


History of Present Illness: 





The patient is a 57 year old female, with significant past medical history of 

hypothyroidism, HTN, who presents today complaining of 2 weeks of right 

abdominal pain and right sided chest pain. The patient states that the pain 

started on her upper right side and has progressively radiated to under the 

right breast and the back. The pain is constant, but increases in intensity at 

times.  She states that the pain is accompanied by nausea. The last time she 

vomited was on Monday, 2 days ago. She states that she did not eat today 

because the pain and medication were making her nauseous. She notes that she 

visited the Urgent Care at [facility] for this pain 2 weeks ago where they 

treated her for a muscle spasm. She was prescribed Methocarbamol (500mg) for 

the pain has been taking it intermittently. 














- History Source


History Provided By: Patient, Medical Record


Limitations to Obtaining History: No Limitations





- Past Medical History


CNS: No: Alzheimer's


Cardio/Vascular: Yes: HTN.  No: AFIB


Pulmonary: No: Asthma


Gastrointestinal: Yes: Other (h/o abdominal hernia)


Endocrine: Yes: Hypothyroidism





- Past Surgical History


Past Surgical History: Yes: Cholecystectomy, Hernia Repair





- Alcohol/Substance Use


Hx Alcohol Use: No





- Smoking History


Smoking history: Never smoked


Have you smoked in the past 12 months: No


Aproximately how many cigarettes per day: 0





- Social History


ADL: Independent


History of Recent Travel: No





Home Medications





- Allergies


Allergies/Adverse Reactions: 


 Allergies











Allergy/AdvReac Type Severity Reaction Status Date / Time


 


No Known Allergies Allergy   Verified 03/15/17 17:47














- Home Medications


Home Medications: 


Ambulatory Orders





Levothyroxine [Synthroid -] 125 mcg PO DAILY 09/27/14 


Amlodipine Besylate [Norvasc -] 10 mg PO DAILY 03/15/17 


Losartan/Hydrochlorothiazide [Losartan-Hctz 50-12.5 mg Tab] 1 each PO DAILY 03/

15/17 


Methocarbamol [Robaxin -] 500 mg PO DAILY PRN 03/15/17 











Family Disease History





- Family Disease History


Family Disease History: Heart Disease: Brother (HTN), Sister





Review of Systems





- Review of Systems


Constitutional: reports: Fever, Lethargy, Loss of Appetite


Eyes: denies: Blurred Vision


HENT: denies: Difficult Swallowing


Neck: denies: Decreased ROM


Cardiovascular: reports: Chest Pain (RIGHT LATERAL)


Respiratory: reports: Cough, Exercise Intolerance, SOB.  denies: Hemoptysis


Gastrointestinal: denies: Abdominal Pain


Genitourinary: reports: No Symptoms


Breasts: reports: No Symptoms Reported





Physical Exam


Vital Sings: 


 Vital Signs











Temperature  102.5 F H  03/17/17 14:00


 


Pulse Rate  100 H  03/17/17 14:00


 


Respiratory Rate  22   03/17/17 14:00


 


Blood Pressure  122/74   03/17/17 06:40


 


O2 Sat by Pulse Oximetry (%)  95   03/16/17 19:50











Constitutional: Yes: Calm


Eyes: Yes: EOM Intact


HENT: Yes: Normocephalic


Neck: Yes: Trachea Midline


Cardiovascular: Yes: Regular Rate and Rhythm, S1, S2


Respiratory: Yes: Diminished, Dullness (RIGHT POSTERIOR BASE UP 1/3 LUNG FIELD)


Gastrointestinal: Yes: Normal Bowel Sounds, Abdomen, Obese


Extremities: No: Calf Tenderness


Edema: No


Neurological: Yes: Alert


Labs: 


 CBC, BMP





 03/17/17 07:00 





 03/17/17 07:00 


REST REVIEWED








Imaging





- Results


Chest X-ray: Image Reviewed


Cat Scan: Image Reviewed


EKG: Image Reviewed





Assessment/Plan


COMMUNITY ACQUIRED PNEUMONIA WITH UNCOMPLICATED PARA-PNEUMONIC EFFUSION


GRAM STAIN NEGATIVE/GLUCOSE GREATER THAN 60/PH 7.43





AGREE WITH ANTIBIOTICS/IV FLUIDS AS NEEDED/O2 SUPPLEMENTATION/FEVER REDUCTION


NO PLANS FOR DRAINAGE PRESENTLY





WILL FOLLOW





BYRON TRACEY MD

## 2017-03-17 NOTE — PN
Teaching Attending Note


Name of Resident: Dirk Mars


ATTENDING PHYSICIAN STATEMENT





I saw and evaluated the patient.


I reviewed the resident's note and discussed the case with the resident.


I agree with the resident's findings and plan as documented.





Patient is feeling better, with no acute distress. Has less pain than before.





 Vital Signs











Temperature  100.2 F H  03/17/17 06:43


 


Pulse Rate  94 H  03/17/17 02:00


 


Respiratory Rate  18   03/16/17 22:00


 


Blood Pressure  122/74   03/16/17 22:00


 


O2 Sat by Pulse Oximetry (%)  95   03/16/17 19:50








 CBCD











WBC  9.8 K/mm3 (4.0-10.0)  D 03/16/17  07:50    


 


RBC  4.13 M/mm3 (3.60-5.2)   03/16/17  07:50    


 


Hgb  11.8 GM/dL (10.7-15.3)   03/16/17  07:50    


 


Hct  35.1 % (32.4-45.2)   03/16/17  07:50    


 


MCV  85.1 fl (80-96)   03/16/17  07:50    


 


MCHC  33.5 g/dl (32.0-36.0)   03/16/17  07:50    


 


RDW  13.9 % (11.6-15.6)   03/16/17  07:50    


 


Plt Count  333 K/MM3 (134-434)   03/16/17  07:50    


 


MPV  7.5 fl (7.5-11.1)   03/16/17  07:50    








 CMP











Sodium  136 mmol/L (136-145)   03/16/17  07:50    


 


Potassium  3.3 mmol/L (3.5-5.1)  L  03/16/17  07:50    


 


Chloride  97 mmol/L ()  L  03/16/17  07:50    


 


Carbon Dioxide  28 mmol/L (21-32)   03/16/17  07:50    


 


Anion Gap  11  (8-16)   03/16/17  07:50    


 


BUN  9 mg/dL (7-18)   03/16/17  07:50    


 


Creatinine  0.6 mg/dL (0.55-1.02)   03/16/17  07:50    


 


Creat Clearance w eGFR  > 60  (>60)   03/15/17  18:02    


 


Random Glucose  106 mg/dL ()   03/16/17  07:50    


 


Calcium  7.9 mg/dL (8.5-10.1)  L  03/16/17  07:50    


 


Total Bilirubin  0.8 mg/dL (0.2-1.0)  D 03/15/17  18:02    


 


AST  11 U/L (15-37)  L D 03/15/17  18:02    


 


ALT  13 U/L (12-78)  D 03/15/17  18:02    


 


Alkaline Phosphatase  79 U/L ()   03/15/17  18:02    


 


Total Protein  7.4 g/dl (6.4-8.2)   03/15/17  18:02    


 


Albumin  3.0 g/dl (3.4-5.0)  L  03/16/17  07:50    








 CARDIAC ENZYMES











Creatine Kinase  41 IU/L ()   03/15/17  18:02    


 


Troponin I  < 0.02 ng/ml (0.00-0.05)   03/15/17  18:02    








 Current Medications











Generic Name Dose Route Start Last Admin





  Trade Name Freq  PRN Reason Stop Dose Admin


 


Acetaminophen  650 mg 03/16/17 03:42 03/17/17 05:47





  Tylenol -  PO   650 mg





  Q6H PRN   Administration





  FEVER OR PAIN   


 


Amlodipine Besylate  10 mg 03/16/17 10:00 03/16/17 14:36





  Norvasc -  PO   10 mg





  DAILY ROBIN   Administration


 


Heparin Sodium (Porcine)  5,000 unit 03/16/17 22:00 03/17/17 06:23





  Heparin -  SQ   5,000 unit





  TID ROBIN   Administration


 


Hydrochlorothiazide  12.5 mg 03/16/17 11:00 03/16/17 14:36





  Hctz -  PO   12.5 mg





  DAILY ROBIN   Administration


 


Ceftriaxone Sodium  100 mls @ 200 mls/hr 03/16/17 15:00 03/16/17 17:48





  Rocephin 2gm Ivpb (Pre-Docked)  IVPB   200 mls/hr





  DAILY ROBIN   Administration


 


Azithromycin  250 mls @ 250 mls/hr 03/16/17 15:00 03/16/17 17:48





  Zithromax 500mg Ivpb (Pre-Docked)  IVPB   250 mls/hr





  DAILY ROBIN   Administration


 


Levothyroxine Sodium  125 mcg 03/16/17 07:00 03/17/17 06:23





  Synthroid -  PO   125 mcg





  DAILY@0700 ROBIN   Administration


 


Losartan Potassium  50 mg 03/16/17 10:00 03/16/17 14:36





  Cozaar -  PO   50 mg





  DAILY ROBIN   Administration


 


Morphine Sulfate  2 mg 03/16/17 03:14  





  Morphine Injection -  IVPUSH   





  Q6H PRN   





  PAIN   


 


Potassium Chloride  40 meq 03/16/17 22:00 03/16/17 22:05





  K-Dur -  PO 03/17/17 10:01  40 meq





  BID ROBIN   Administration








 Home Medications











 Medication  Instructions  Recorded


 


Levothyroxine [Synthroid -] 125 mcg PO DAILY 09/27/14


 


Amlodipine Besylate [Norvasc -] 10 mg PO DAILY 03/15/17


 


Losartan/Hydrochlorothiazide 1 each PO DAILY 03/15/17





[Losartan-Hctz 50-12.5 mg Tab]  


 


Methocarbamol [Robaxin -] 500 mg PO DAILY PRN 03/15/17








 Microbiology





03/15/17 18:02   Blood - Peripheral Venous   Blood Culture - Preliminary


                            NO GROWTH OBTAINED AFTER 72 HOURS, INCUBATION TO 

CONTINUE


                            FOR 2 DAYS.


03/15/17 18:02   Blood - Peripheral Venous   Blood Culture - Preliminary


                            NO GROWTH OBTAINED AFTER 72 HOURS, INCUBATION TO 

CONTINUE


                            FOR 2 DAYS.


03/16/17 11:25   Pleural Fluid   Gram Stain - Final


03/16/17 11:25   Pleural Fluid   Body Fluid Culture - Final


                            NO GROWTH OF AEROBIC ORGANISMS AFTER 48 HOURS 

INCUBATION


03/16/17 11:25   Pleural Fluid   Anaerobic Culture - Final


                            NO ANAEROBES WERE ISOLATED


03/17/17 04:00   Urine - Urine Clean Catch   Urine Culture - Final


                            NO GROWTH OBTAINED


03/16/17 15:30   Blood - Peripheral Venous   TB Test (QFT) (MALI) - Preliminary


03/17/17 04:00   Urine - Urine Clean Catch   Legionella Antigen - Final


03/17/17 04:00   Urine - Urine Clean Catch   Streptococcus pneumoniae Antigen (

M - Final


03/16/17 11:25   Pleural Fluid   KOH Preparation - Preliminary


03/16/17 11:25   Pleural Fluid   Fungal Culture - Preliminary











ASSESSMENT AND PLAN:


Patient is a 55 y/o F w/PMH of HTN, hypothyroidism presented to ER w/ R sided 

chest/abdominal pain x 2 days. Found to have R pleural effusion and thought to 

have a liver mass.





# Acute Right pleural effusion s/p thoracocentesis by IR for diagnostic 

purposes F/u pleural fluid studies 


CT of abdomen was reviewed and was reported as a liver mass, after reviewing 

the scans with radiology they do not feel they have seen a mass. LFTs WNL 





#Community acquired pneumonia On Rocephin and Zithromax as per ID continue





# Acute sepsis (leukocytosis and fever): after evaluation by ID it is thought 

she has a pneumonia with pleuretic chest pain. will continue IV antibiotic  





#HTN continue Home emds Norvasc Losartan and HCTZ





#Hypothyroidism Syntrhoid 125mcg qam 





DVT px; hEPARIN SQ, scd, EARLY AMBULATION

## 2017-03-17 NOTE — MSN
Progress Note (short form)





- Note


Progress Note: 


SUBJECTIVE: Pt seen and examined at bedside. Pt had recorded fevers overnight. 

Pt states pain has decreased since yesterday. Admits to fevers, chills, 

diaphoresis, nausea, and headache. Denies dysuria and vomiting. She had +1 BM 

yesterday. 





 Active Medications








Generic Name Dose Route Start Last Admin





  Trade Name Freq  PRN Reason Stop Dose Admin


 


Acetaminophen  650 mg 03/16/17 03:42 03/17/17 05:47





  Tylenol -  PO   650 mg





  Q6H PRN   Administration





  FEVER OR PAIN   


 


Amlodipine Besylate  10 mg 03/16/17 10:00 03/17/17 10:22





  Norvasc -  PO   10 mg





  DAILY ROBIN   Administration


 


Heparin Sodium (Porcine)  5,000 unit 03/16/17 22:00 03/17/17 06:23





  Heparin -  SQ   5,000 unit





  TID ROBIN   Administration


 


Hydrochlorothiazide  12.5 mg 03/16/17 11:00 03/17/17 10:22





  Hctz -  PO   12.5 mg





  DAILY ROBIN   Administration


 


Ceftriaxone Sodium  100 mls @ 200 mls/hr 03/16/17 15:00 03/17/17 10:21





  Rocephin 2gm Ivpb (Pre-Docked)  IVPB   200 mls/hr





  DAILY ROBIN   Administration


 


Azithromycin  250 mls @ 250 mls/hr 03/16/17 15:00 03/17/17 10:21





  Zithromax 500mg Ivpb (Pre-Docked)  IVPB   250 mls/hr





  DAILY ROBIN   Administration


 


Levothyroxine Sodium  125 mcg 03/16/17 07:00 03/17/17 06:23





  Synthroid -  PO   125 mcg





  DAILY@0700 ROBIN   Administration


 


Losartan Potassium  50 mg 03/16/17 10:00 03/17/17 10:22





  Cozaar -  PO   50 mg





  DAILY ROBIN   Administration


 


Morphine Sulfate  2 mg 03/16/17 03:14  





  Morphine Injection -  IVPUSH   





  Q6H PRN   





  PAIN   


 


Potassium Chloride  40 meq 03/17/17 10:45  





  K-Dur -  PO 03/17/17 22:01  





  BID ROBIN   











OBJECTIVE:


 Vital Signs








 Period  Temp  Pulse  Resp  BP Sys/Olivares  Pulse Ox


 


 Last 24 Hr  98.6 F-102.8 F    18-20  102-142/66-74  95








GENERAL: AAOx3 in NAD 


HEAD: Normocephalic, atraumatic. PERRLA, EOMI


NECK: No JVD


HEART: Regular rate and rhythm. +S1/S2. No murmurs/rubs/gallops


LUNGS: CTAB with decreased breath sounds at R lung base. + crackles at the R 

base. No rhonchi or wheezing


ABDOMEN: Soft, nondistended, and no tenderness to palpation. Normal bowel sounds


EXT: BL LE warm with no edema


NEURO: Normal speech





 CBC, BMP


 03/17/17 07:00 





 03/17/17 07:00 





 CMP








Sodium  135 mmol/L (136-145)  L  03/17/17  07:00    


 


Potassium  3.5 mmol/L (3.5-5.1)   03/17/17  07:00    


 


Chloride  95 mmol/L ()  L  03/17/17  07:00    


 


Carbon Dioxide  27 mmol/L (21-32)   03/17/17  07:00    


 


Anion Gap  13  (8-16)   03/17/17  07:00    


 


BUN  7 mg/dL (7-18)  D 03/17/17  07:00    


 


Creatinine  0.5 mg/dL (0.55-1.02)  L  03/17/17  07:00    


 


Creat Clearance w eGFR  > 60  (>60)   03/17/17  07:00    


 


Random Glucose  116 mg/dL ()  H  03/17/17  07:00    


 


Lactic Acid  1.306 mmol/L (0.4-2.0)   03/15/17  18:02    


 


Calcium  7.8 mg/dL (8.5-10.1)  L  03/17/17  07:00    


 


Total Bilirubin  0.6 mg/dL (0.2-1.0)  D 03/17/17  07:00    


 


AST  12 U/L (15-37)  L  03/17/17  07:00    


 


ALT  12 U/L (12-78)   03/17/17  07:00    


 


Alkaline Phosphatase  74 U/L ()   03/17/17  07:00    


 


LD Total  158 U/L ()   03/16/17  07:50    


 


Creatine Kinase  41 IU/L ()   03/15/17  18:02    


 


Troponin I  < 0.02 ng/ml (0.00-0.05)   03/15/17  18:02    


 


C-Reactive Protein  21.5 MG/DL (0.00-0.3)  H  03/16/17  15:30    


 


Total Protein  6.6 g/dl (6.4-8.2)   03/17/17  07:00    


 


Albumin  2.7 g/dl (3.4-5.0)  L  03/17/17  07:00    


 


Tumor Marker AFP  1.5 ng/ml (0.0-8.3)   03/16/17  08:05    








CT Abd/Pelvis (3/16/17): Posterior abdominal wall mass with stranding in the 

posterior and anterior RUQ outside the liver. Mass contains calcification. Mass 

is not inside the liver body.








A/P:


Pt is a 55 yo F with a PMHx of HTN, hypothyroidism, and TAWNYA who presented to 

the ED with worsening R sided chest/abdominal pain x2 days. Pt was found to 

have a R sided pleural effusion and hepatic mass on initial CT chest. Admitted 

for further management and observation.





1. Sepsis 2/2 CAP with pleural effusion


-Stable. Pt is spiking fevers, but pt is not becoming significantly hypotensive 


-CRP elevated at 21.5


-Thoracentesis performed yesterday


-Pleural fluid is exudative. All 3 Light's criteria met. Consistent with PNA 

etiology


-Pleural fluid Gram stain showed no organisms


-Pleural fluid Cx pending


-Consider repeat CXR before DC





2. Posterior abdominal wall mass


-CT abd/pelvis performed yesterday


-Met with Radiology to go over scan: ? inflammatory process in the posterior 

and anterior RUQ outside the liver body 2/2 to dropped stone from previous 

cholecystectomy. Not intrahepatic mass


-AFP negative


-Hep C Ab negative


-Hepatitis panel pending


-TB pending


-Morphine 2mg IV Q6H prn for pain


-GI consult not warranted at this time





3. Leukocytosis


-Improved (14.5 to 9.0)


-2/2 to CAP vs intra abdominal inflammatory process


-Blood Cx NGTD


-Urine Ag pending


-HIV panel negative


-Azithromycin 500mg and Rocephin 2gm (day 2)


-ID consult appreciated





4. HTN


-Stable


-Losartan 50mg PO daily


-HCTZ 12.5mg PO daily


-Norvasc 10mg PO daily


-K+ supplement with 40meq PO BID





5. Hypothyroidism


-Synthroid 125mcg PO daily





6. FEN


-IVF stopped


-Low Na diet





7. DVT ppx


-Heparin 5000U SQ TID


-SCDs





8. Dispo


-Pt admitted to [facility] for further evaluation


-Possible DC in after the weekend








Jaspreet Calderón, MS3

## 2017-03-18 LAB
ANION GAP SERPL CALC-SCNC: 9 MMOL/L (ref 8–16)
CALCIUM SERPL-MCNC: 7.8 MG/DL (ref 8.5–10.1)
CO2 SERPL-SCNC: 27 MMOL/L (ref 21–32)
CREAT SERPL-MCNC: 0.5 MG/DL (ref 0.55–1.02)
DEPRECATED RDW RBC AUTO: 14.2 % (ref 11.6–15.6)
GLUCOSE SERPL-MCNC: 122 MG/DL (ref 74–106)
MCH RBC QN AUTO: 29 PG (ref 25.7–33.7)
MCHC RBC AUTO-ENTMCNC: 34.5 G/DL (ref 32–36)
MCV RBC: 84.2 FL (ref 80–96)
PLATELET # BLD AUTO: 322 K/MM3 (ref 134–434)
PMV BLD: 7.4 FL (ref 7.5–11.1)
WBC # BLD AUTO: 7.3 K/MM3 (ref 4–10)

## 2017-03-18 RX ADMIN — HEPARIN SODIUM SCH UNIT: 5000 INJECTION, SOLUTION INTRAVENOUS; SUBCUTANEOUS at 06:39

## 2017-03-18 RX ADMIN — HEPARIN SODIUM SCH UNIT: 5000 INJECTION, SOLUTION INTRAVENOUS; SUBCUTANEOUS at 13:42

## 2017-03-18 RX ADMIN — ACETAMINOPHEN PRN MG: 325 TABLET ORAL at 22:14

## 2017-03-18 RX ADMIN — CEFTRIAXONE SODIUM SCH MLS/HR: 2 INJECTION, POWDER, FOR SOLUTION INTRAMUSCULAR; INTRAVENOUS at 10:35

## 2017-03-18 RX ADMIN — HYDROCHLOROTHIAZIDE SCH MG: 12.5 CAPSULE ORAL at 10:34

## 2017-03-18 RX ADMIN — LOSARTAN POTASSIUM SCH MG: 50 TABLET, FILM COATED ORAL at 10:34

## 2017-03-18 RX ADMIN — LEVOTHYROXINE SODIUM SCH MCG: 125 TABLET ORAL at 06:39

## 2017-03-18 RX ADMIN — AMLODIPINE BESYLATE SCH MG: 10 TABLET ORAL at 10:34

## 2017-03-18 RX ADMIN — AZITHROMYCIN DIHYDRATE SCH MLS/HR: 500 INJECTION, POWDER, LYOPHILIZED, FOR SOLUTION INTRAVENOUS at 10:35

## 2017-03-18 RX ADMIN — ACETAMINOPHEN PRN MG: 325 TABLET ORAL at 11:24

## 2017-03-18 RX ADMIN — HEPARIN SODIUM SCH UNIT: 5000 INJECTION, SOLUTION INTRAVENOUS; SUBCUTANEOUS at 22:15

## 2017-03-18 NOTE — HOSP
Subjective





- Review of Symptoms


Events since last encounter: 


-Paged at approximately 10:00 pm regarding fever of 101.6


-Last blood culture is from 3/15/17


-Blood culture stat ordered.





Physical Examination


Vital Signs: 


 Vital Signs











Temperature  98.7 F   03/18/17 18:13


 


Pulse Rate  86   03/18/17 18:13


 


Respiratory Rate  20   03/18/17 18:13


 


Blood Pressure  110/75   03/18/17 18:13


 


O2 Sat by Pulse Oximetry (%)  97   03/18/17 09:00











Labs: 


 CBC, BMP





 03/18/17 08:25 





 03/18/17 08:25 











Visit type





- Emergency Visit


Emergency Visit: Yes


ED Registration Date: 03/15/17


Care time: The patient presented to the Emergency Department on the above date 

and was hospitalized for further evaluation of their emergent condition.





- New Patient


This patient is new to me today: No





- Critical Care


Critical Care patient: No

## 2017-03-18 NOTE — PN
Progress Note (short form)





- Note


Progress Note: 


PULMONARY





Feels better, still some right sided pain/discomfort. Fever to 102.6 overnight. 

Nonproductive cough but no shortness of breath.





 Last Vital Signs











Temp Pulse Resp BP Pulse Ox


 


 99.5 F   91 H  20   119/67   97 


 


 03/18/17 10:58  03/18/17 08:00  03/18/17 08:00  03/18/17 08:00  03/18/17 09:00








Gen:  NAD at rest


Heart:  RRR


Lung: clear to auscultation


Abd: soft, nontender


Ext: no edema





 CBC, BMP





 03/18/17 08:25 





 03/18/17 08:25 





Active Medications





Acetaminophen (Tylenol -)  650 mg PO Q6H PRN


   PRN Reason: FEVER OR PAIN


   Last Admin: 03/18/17 11:24 Dose:  650 mg


Amlodipine Besylate (Norvasc -)  10 mg PO DAILY [facility]


   Last Admin: 03/18/17 10:34 Dose:  10 mg


Heparin Sodium (Porcine) (Heparin -)  5,000 unit SQ TID [facility]


   Last Admin: 03/18/17 06:39 Dose:  5,000 unit


Hydrochlorothiazide (Hctz -)  12.5 mg PO DAILY [facility]


   Last Admin: 03/18/17 10:34 Dose:  12.5 mg


Ceftriaxone Sodium (Rocephin 2gm Ivpb (Pre-Docked))  100 mls @ 200 mls/hr IVPB 

DAILY [facility]


   Last Admin: 03/18/17 10:35 Dose:  200 mls/hr


Azithromycin (Zithromax 500mg Ivpb (Pre-Docked))  250 mls @ 250 mls/hr IVPB 

DAILY [facility]


   Last Admin: 03/18/17 10:35 Dose:  250 mls/hr


Levothyroxine Sodium (Synthroid -)  125 mcg PO DAILY@0700 [facility]


   Last Admin: 03/18/17 06:39 Dose:  125 mcg


Losartan Potassium (Cozaar -)  50 mg PO DAILY [facility]


   Last Admin: 03/18/17 10:34 Dose:  50 mg


Morphine Sulfate (Morphine Injection -)  2 mg IVPUSH Q6H PRN


   PRN Reason: PAIN





A/P


Pneumonia


Parapneumonic Pleural Effusion


Atelectasis


HTN


Hypothyroidism





-  continue antibiotics


-  monitor fever curve, WBC trend


-  f/u pleural fluid cultures


-  pain control


-  DVT prophylaxis

## 2017-03-19 LAB
ANION GAP SERPL CALC-SCNC: 8 MMOL/L (ref 8–16)
BASOPHILS # BLD: 0.4 % (ref 0–2)
CALCIUM SERPL-MCNC: 7.9 MG/DL (ref 8.5–10.1)
CO2 SERPL-SCNC: 28 MMOL/L (ref 21–32)
CREAT SERPL-MCNC: 0.4 MG/DL (ref 0.55–1.02)
DEPRECATED RDW RBC AUTO: 14.1 % (ref 11.6–15.6)
EOSINOPHIL # BLD: 5.4 % (ref 0–4.5)
GLUCOSE SERPL-MCNC: 96 MG/DL (ref 74–106)
MCH RBC QN AUTO: 29 PG (ref 25.7–33.7)
MCHC RBC AUTO-ENTMCNC: 34.5 G/DL (ref 32–36)
MCV RBC: 84.2 FL (ref 80–96)
NEUTROPHILS # BLD: 66 % (ref 42.8–82.8)
PLATELET # BLD AUTO: 351 K/MM3 (ref 134–434)
PMV BLD: 7.5 FL (ref 7.5–11.1)
WBC # BLD AUTO: 6.7 K/MM3 (ref 4–10)

## 2017-03-19 RX ADMIN — HEPARIN SODIUM SCH UNIT: 5000 INJECTION, SOLUTION INTRAVENOUS; SUBCUTANEOUS at 06:15

## 2017-03-19 RX ADMIN — AMLODIPINE BESYLATE SCH MG: 10 TABLET ORAL at 09:45

## 2017-03-19 RX ADMIN — HEPARIN SODIUM SCH UNIT: 5000 INJECTION, SOLUTION INTRAVENOUS; SUBCUTANEOUS at 15:07

## 2017-03-19 RX ADMIN — HYDROCHLOROTHIAZIDE SCH MG: 12.5 CAPSULE ORAL at 09:45

## 2017-03-19 RX ADMIN — POTASSIUM CHLORIDE SCH MEQ: 1500 TABLET, EXTENDED RELEASE ORAL at 11:14

## 2017-03-19 RX ADMIN — LOSARTAN POTASSIUM SCH MG: 50 TABLET, FILM COATED ORAL at 09:45

## 2017-03-19 RX ADMIN — HEPARIN SODIUM SCH UNIT: 5000 INJECTION, SOLUTION INTRAVENOUS; SUBCUTANEOUS at 23:48

## 2017-03-19 RX ADMIN — LEVOTHYROXINE SODIUM SCH MCG: 125 TABLET ORAL at 06:15

## 2017-03-19 RX ADMIN — POTASSIUM CHLORIDE SCH MEQ: 1500 TABLET, EXTENDED RELEASE ORAL at 23:48

## 2017-03-19 RX ADMIN — CEFTRIAXONE SODIUM SCH MLS/HR: 2 INJECTION, POWDER, FOR SOLUTION INTRAMUSCULAR; INTRAVENOUS at 11:14

## 2017-03-19 RX ADMIN — AZITHROMYCIN DIHYDRATE SCH MLS/HR: 500 INJECTION, POWDER, LYOPHILIZED, FOR SOLUTION INTRAVENOUS at 09:45

## 2017-03-19 NOTE — PN
Teaching Attending Note


Name of Resident: Dirk Mars


ATTENDING PHYSICIAN STATEMENT





I saw and evaluated the patient.


I reviewed the resident's note and discussed the case with the resident.


I agree with the resident's findings and plan as documented.








Feeling better with no difficulty breathing, less pleuretic pain.





 Vital Signs











Temperature  99.3 F   03/19/17 10:00


 


Pulse Rate  100 H  03/19/17 10:00


 


Respiratory Rate  20   03/19/17 10:00


 


Blood Pressure  128/67   03/19/17 10:00


 


O2 Sat by Pulse Oximetry (%)  98   03/19/17 09:00








 CBCD











WBC  6.7 K/mm3 (4.0-10.0)   03/19/17  06:15    


 


RBC  3.74 M/mm3 (3.60-5.2)   03/19/17  06:15    


 


Hgb  10.9 GM/dL (10.7-15.3)   03/19/17  06:15    


 


Hct  31.5 % (32.4-45.2)  L  03/19/17  06:15    


 


MCV  84.2 fl (80-96)   03/19/17  06:15    


 


MCHC  34.5 g/dl (32.0-36.0)   03/19/17  06:15    


 


RDW  14.1 % (11.6-15.6)   03/19/17  06:15    


 


Plt Count  351 K/MM3 (134-434)   03/19/17  06:15    


 


MPV  7.5 fl (7.5-11.1)   03/19/17  06:15    








 CMP











Sodium  135 mmol/L (136-145)  L  03/19/17  06:15    


 


Potassium  3.6 mmol/L (3.5-5.1)   03/19/17  06:15    


 


Chloride  99 mmol/L ()   03/19/17  06:15    


 


Carbon Dioxide  28 mmol/L (21-32)   03/19/17  06:15    


 


Anion Gap  8  (8-16)   03/19/17  06:15    


 


BUN  7 mg/dL (7-18)   03/19/17  06:15    


 


Creatinine  0.4 mg/dL (0.55-1.02)  L  03/19/17  06:15    


 


Creat Clearance w eGFR  > 60  (>60)   03/17/17  07:00    


 


Random Glucose  96 mg/dL ()  D 03/19/17  06:15    


 


Calcium  7.9 mg/dL (8.5-10.1)  L  03/19/17  06:15    


 


Total Bilirubin  0.6 mg/dL (0.2-1.0)  D 03/17/17  07:00    


 


AST  12 U/L (15-37)  L  03/17/17  07:00    


 


ALT  12 U/L (12-78)   03/17/17  07:00    


 


Alkaline Phosphatase  74 U/L ()   03/17/17  07:00    


 


Total Protein  6.6 g/dl (6.4-8.2)   03/17/17  07:00    


 


Albumin  2.7 g/dl (3.4-5.0)  L  03/17/17  07:00    








 CARDIAC ENZYMES











Creatine Kinase  41 IU/L ()   03/15/17  18:02    


 


Troponin I  < 0.02 ng/ml (0.00-0.05)   03/15/17  18:02    








 Current Medications











Generic Name Dose Route Start Last Admin





  Trade Name Krissy  PRN Reason Stop Dose Admin


 


Acetaminophen  650 mg 03/16/17 03:42 03/18/17 22:14





  Tylenol -  PO   650 mg





  Q6H PRN   Administration





  FEVER OR PAIN   


 


Amlodipine Besylate  10 mg 03/16/17 10:00 03/19/17 09:45





  Norvasc -  PO   10 mg





  DAILY ROBIN   Administration


 


Heparin Sodium (Porcine)  5,000 unit 03/16/17 22:00 03/19/17 15:07





  Heparin -  SQ   5,000 unit





  TID ROBIN   Administration


 


Hydrochlorothiazide  12.5 mg 03/16/17 11:00 03/19/17 09:45





  Hctz -  PO   12.5 mg





  DAILY ROBIN   Administration


 


Ceftriaxone Sodium  100 mls @ 200 mls/hr 03/16/17 15:00 03/19/17 11:14





  Rocephin 2gm Ivpb (Pre-Docked)  IVPB   200 mls/hr





  DAILY ROBIN   Administration


 


Azithromycin  250 mls @ 250 mls/hr 03/16/17 15:00 03/19/17 09:45





  Zithromax 500mg Ivpb (Pre-Docked)  IVPB   250 mls/hr





  DAILY ROBIN   Administration


 


Levothyroxine Sodium  125 mcg 03/16/17 07:00 03/19/17 06:15





  Synthroid -  PO   125 mcg





  DAILY@0700 ROBIN   Administration


 


Losartan Potassium  50 mg 03/16/17 10:00 03/19/17 09:45





  Cozaar -  PO   50 mg





  DAILY ROBIN   Administration


 


Potassium Chloride  40 meq 03/19/17 11:00 03/19/17 11:14





  K-Dur -  PO 03/19/17 22:01  40 meq





  BID ROBIN   Administration








 Home Medications











 Medication  Instructions  Recorded


 


Levothyroxine [Synthroid -] 125 mcg PO DAILY 09/27/14


 


Amlodipine Besylate [Norvasc -] 10 mg PO DAILY 03/15/17


 


Losartan/Hydrochlorothiazide 1 each PO DAILY 03/15/17





[Losartan-Hctz 50-12.5 mg Tab]  


 


Methocarbamol [Robaxin -] 500 mg PO DAILY PRN 03/15/17








ASSESSMENT AND PLAN:


Patient is a 57 y/o F w/PMH of HTN, hypothyroidism presented to ER w/ R sided 

chest/abdominal pain x 2 days. Found to have R pleural effusion and thought to 

have a liver mass.





# Acute Right pleural effusion s/p thoracentesis by IR for diagnostic purposes ;

pleural fluid studies-shows it is exudative likely parapneuomonic effusion


CT of abdomen was reviewed and was reported as a liver mass, after reviewing 

the scans with radiology they do not feel they have seen a mass. 


will repeat LFTs in am. 





#Community acquired pneumonia On Rocephin and Zithromax as per ID will continue





# Acute sepsis (leukocytosis and fever): after evaluation by ID it is thought 

she has a pneumonia with pleuretic chest pain. will continue IV antibiotic  





#HTN continue Home emds Norvasc Losartan and HCTZ





#Hypothyroidism Syntrhoid 125mcg qam 





DVT px; hEPARIN SQ, scd, EARLY AMBULATION

## 2017-03-19 NOTE — PN
Physical Exam: 


SUBJECTIVE: Patient seen and examined





comfortable, deneis any pain today.


OBJECTIVE:





 Vital Signs











 Period  Temp  Pulse  Resp  BP Sys/Olivares  Pulse Ox


 


 Last 24 Hr  98.7 F-101.3 F    20-22  108-128/67-75  98-99











GENERAL: The patient is awake, alert, and fully oriented, in no acute distress.


HEAD: Normal with no signs of trauma.


EYES: PERRL, extraocular movements intact, sclera anicteric, conjunctiva clear. 

No ptosis. 


ENT: Ears normal, nares patent, oropharynx clear without exudates, moist mucous 

membranes.


NECK: Trachea midline, full range of motion, supple. 


LUNGS: Breath sounds equal, clear to auscultation bilaterally, no wheezes, no 

crackles, no accessory muscle use. 


HEART: Regular rate and rhythm, S1, S2 without murmur, rub or gallop.


ABDOMEN: Soft, nontender, nondistended, normoactive bowel sounds, no guarding, 

no rebound, no hepatosplenomegaly, no masses.


EXTREMITIES: 2+ pulses, warm, well-perfused, no edema. 


NEUROLOGICAL: Cranial nerves II through XII grossly intact. Normal speech, gait 

is stable 


PSYCH: Normal mood, normal affect.


SKIN: Warm, dry, normal turgor, no rashes or lesions noted








ASSESSMENT/PLAN:


 Microbiology





03/15/17 18:02   Blood - Peripheral Venous   Blood Culture - Preliminary


                            NO GROWTH OBTAINED AFTER 72 HOURS, INCUBATION TO 

CONTINUE


                            FOR 2 DAYS.


03/15/17 18:02   Blood - Peripheral Venous   Blood Culture - Preliminary


                            NO GROWTH OBTAINED AFTER 72 HOURS, INCUBATION TO 

CONTINUE


                            FOR 2 DAYS.


03/16/17 11:25   Pleural Fluid   Gram Stain - Final


03/16/17 11:25   Pleural Fluid   Body Fluid Culture - Final


                            NO GROWTH OF AEROBIC ORGANISMS AFTER 48 HOURS 

INCUBATION


03/16/17 11:25   Pleural Fluid   Anaerobic Culture - Final


                            NO ANAEROBES WERE ISOLATED


03/17/17 04:00   Urine - Urine Clean Catch   Urine Culture - Final


                            NO GROWTH OBTAINED


03/16/17 15:30   Blood - Peripheral Venous   TB Test (QFT) (MALI) - Preliminary


03/17/17 04:00   Urine - Urine Clean Catch   Legionella Antigen - Final


03/17/17 04:00   Urine - Urine Clean Catch   Streptococcus pneumoniae Antigen (

M - Final


03/16/17 11:25   Pleural Fluid   KOH Preparation - Preliminary


03/16/17 11:25   Pleural Fluid   Fungal Culture - Preliminary





 Home Medications











 Medication  Instructions  Recorded


 


Levothyroxine [Synthroid -] 125 mcg PO DAILY 09/27/14


 


Amlodipine Besylate [Norvasc -] 10 mg PO DAILY 03/15/17


 


Losartan/Hydrochlorothiazide 1 each PO DAILY 03/15/17





[Losartan-Hctz 50-12.5 mg Tab]  


 


Methocarbamol [Robaxin -] 500 mg PO DAILY PRN 03/15/17














A/P


Patient is a 55 y/o F w/PMH of HTN, hypothyroidism presented to ER w/ R sided 

chest/abdominal pain x 2 days. Found to have R pleural effusion and thought to 

have a liver mass.





# Acute Right pleural effusion s/p thoracentesis by IR for diagnostic purposes ;

pleural fluid studies-shows it is exudative likely parapneuomonic effusion


will repeat LFTs in am. feels better but not 100% yet.





# Parapneumonic effusion On Rocephin and Zithromax as per ID will continue





#s/p  Acute sepsis (leukocytosis and fever): after evaluation by ID it is 

thought she has a pneumonia with pleuretic chest pain. will continue IV 

antibiotic  





#HTN continue Home emds Norvasc Losartan and HCTZ





#Hypothyroidism Syntrhoid 125mcg qam 





DVT px; hEPARIN SQ, scd, EARLY AMBULATION 





Visit type





- Emergency Visit


Emergency Visit: Yes


ED Registration Date: 03/15/17


Care time: The patient presented to the Emergency Department on the above date 

and was hospitalized for further evaluation of their emergent condition.





- New Patient


This patient is new to me today: No





- Critical Care


Critical Care patient: No

## 2017-03-19 NOTE — PN
Physical Exam: 


SUBJECTIVE: Patient seen and examined at bedside


spiked temp to 101.3 last night and was cultured 








OBJECTIVE:





 Vital Signs











 Period  Temp  Pulse  Resp  BP Sys/Olivares  Pulse Ox


 


 Last 24 Hr  98.1 F-101.3 F    20-22  108-128/67-75  98-99











GENERAL: The patient is awake, alert, and fully oriented, in no acute distress. 


HEAD: Normal with no signs of trauma. 


NECK:  supple. 


LUNGS:  clear to auscultation bilaterally diminished breath sounds on the right 

no wheezes, no crackles, no 


accessory muscle use. 


HEART: Regular rate and rhythm, S1, S2 without murmur, rub or gallop.


ABDOMEN: mild right sided abdominal tenderness-continues to improve but still 

with some right flank tenderness- no chest tenderness . ABD soft non distended 


NEUROLOGICAL: Cranial nerves II through XII grossly intact. Normal speech 


PSYCH: Normal mood, normal affect.

















 Laboratory Results - last 24 hr











  03/17/17 03/19/17 03/19/17





  07:00 06:15 06:15


 


WBC   6.7 


 


RBC   3.74 


 


Hgb   10.9 


 


Hct   31.5 L 


 


MCV   84.2 


 


MCHC   34.5 


 


RDW   14.1 


 


Plt Count   351 


 


MPV   7.5 


 


Neutrophils %   66.0 


 


Lymphocytes %   14.8  D 


 


Monocytes %   13.4 H 


 


Eosinophils %   5.4 H D 


 


Basophils %   0.4 


 


Sodium    135 L


 


Potassium    3.6


 


Chloride    99


 


Carbon Dioxide    28


 


Anion Gap    8


 


BUN    7


 


Creatinine    0.4 L


 


Random Glucose    96  D


 


Calcium    7.9 L


 


JAMA Screen  Positive H  


 


JAMA Homogeneous Pattern  1:160 H  


 


JAMA Nucleolar Pattern  TNP  


 


JAMA Speckled Pattern  1:160 H  


 


JAMA Centromere Pattern  TNP  








Active Medications











Generic Name Dose Route Start Last Admin





  Trade Name Freq  PRN Reason Stop Dose Admin


 


Acetaminophen  650 mg 03/16/17 03:42 03/18/17 22:14





  Tylenol -  PO   650 mg





  Q6H PRN   Administration





  FEVER OR PAIN   


 


Amlodipine Besylate  10 mg 03/16/17 10:00 03/19/17 09:45





  Norvasc -  PO   10 mg





  DAILY ROBIN   Administration


 


Heparin Sodium (Porcine)  5,000 unit 03/16/17 22:00 03/19/17 06:15





  Heparin -  SQ   5,000 unit





  TID ROBIN   Administration


 


Hydrochlorothiazide  12.5 mg 03/16/17 11:00 03/19/17 09:45





  Hctz -  PO   12.5 mg





  DAILY ROBIN   Administration


 


Ceftriaxone Sodium  100 mls @ 200 mls/hr 03/16/17 15:00 03/19/17 11:14





  Rocephin 2gm Ivpb (Pre-Docked)  IVPB   200 mls/hr





  DAILY ROBIN   Administration


 


Azithromycin  250 mls @ 250 mls/hr 03/16/17 15:00 03/19/17 09:45





  Zithromax 500mg Ivpb (Pre-Docked)  IVPB   250 mls/hr





  DAILY ROBIN   Administration


 


Levothyroxine Sodium  125 mcg 03/16/17 07:00 03/19/17 06:15





  Synthroid -  PO   125 mcg





  DAILY@0700 ROBIN   Administration


 


Losartan Potassium  50 mg 03/16/17 10:00 03/19/17 09:45





  Cozaar -  PO   50 mg





  DAILY ROBIN   Administration


 


Potassium Chloride  40 meq 03/19/17 11:00 03/19/17 11:14





  K-Dur -  PO 03/19/17 22:01  40 meq





  BID ROBIN   Administration











ASSESSMENT/PLAN:


55 y/o F w/PMH of HTN, hypothyroidism presented to ER w/ R sided chest/

abdominal pain x 2 days. Found to have R pleural effusion.





Right pleural effusion/Community acquired pneumonia/Sepsis (leukocytosis and 

fever): community acquired pneumonia with parapneumonic effusion with pleuritic 

chest pain. 


s/p thoracentesis 


ID consult appreciated 


Azithromycin and Ceftriaxone day 4 


pleural fluid studies-shows it is exudative likely parapneuomonic effusion


patient is still febrile but fever curve is trending downwards. Her spikes are 

not as high and not as frequent 


f/u pleural fluid studies is not revealing any infectious source 


pulmonary consult appreciated   





abdominal wall mass: this was seen on chest CT also read as a mass on abdominal 

CT after reviewing the scans with radiology they do not feel there is a mass in 

the liver. it is likely part of the abdominal wall vs a dropped stone from her 

cholecystectomy 


LFTs WNL 


will see if right flank/chest/abdominal pain improves with treatment of 

pneumonia-so far it is 


If patient is still having fevers chills and does not improve in the next 1-2 

days or so an upper abdominal MRI with gadolinium may be of value per Dr. Burks 





HTN


continue Home meds Norvasc Losartan and HCTZ





Hypothyroidism


Synthroid 125mcg qam 





-FEN


no IVF 


hypokalemia will replete 40meg po x2 doses and recheck tomorrow 


Low sodium diet 





PPx:


HSQ/SCDs


No criteria met for GI PPx


no PT evaluation needed 





HLOC  





Visit type





- Emergency Visit


Emergency Visit: Yes


ED Registration Date: 03/15/17


Care time: The patient presented to the Emergency Department on the above date 

and was hospitalized for further evaluation of their emergent condition.





- New Patient


This patient is new to me today: No





- Critical Care


Critical Care patient: No

## 2017-03-19 NOTE — PN
Progress Note (short form)





- Note


Progress Note: 


PULMONARY





Overall better but another fever last night. Nonproductive cough but no 

shortness of breath.





 Last Vital Signs











Temp Pulse Resp BP Pulse Ox


 


 99.3 F   100 H  20   128/67   98 


 


 03/19/17 10:00  03/19/17 10:00  03/19/17 10:00  03/19/17 10:00  03/19/17 09:00











Gen:  NAD at rest


Heart:  RRR


Lung: clear to auscultation


Abd: soft, nontender


Ext: no edema





 CBC, BMP





 03/19/17 06:15 





 03/19/17 06:15 





Active Medications





Acetaminophen (Tylenol -)  650 mg PO Q6H PRN


   PRN Reason: FEVER OR PAIN


   Last Admin: 03/18/17 22:14 Dose:  650 mg


Amlodipine Besylate (Norvasc -)  10 mg PO DAILY [facility]


   Last Admin: 03/19/17 09:45 Dose:  10 mg


Heparin Sodium (Porcine) (Heparin -)  5,000 unit SQ TID [facility]


   Last Admin: 03/19/17 06:15 Dose:  5,000 unit


Hydrochlorothiazide (Hctz -)  12.5 mg PO DAILY [facility]


   Last Admin: 03/19/17 09:45 Dose:  12.5 mg


Ceftriaxone Sodium (Rocephin 2gm Ivpb (Pre-Docked))  100 mls @ 200 mls/hr IVPB 

DAILY [facility]


   Last Admin: 03/19/17 11:14 Dose:  200 mls/hr


Azithromycin (Zithromax 500mg Ivpb (Pre-Docked))  250 mls @ 250 mls/hr IVPB 

DAILY [facility]


   Last Admin: 03/19/17 09:45 Dose:  250 mls/hr


Levothyroxine Sodium (Synthroid -)  125 mcg PO DAILY@0700 [facility]


   Last Admin: 03/19/17 06:15 Dose:  125 mcg


Losartan Potassium (Cozaar -)  50 mg PO DAILY [facility]


   Last Admin: 03/19/17 09:45 Dose:  50 mg


Potassium Chloride (K-Dur -)  40 meq PO BID [facility]


   Stop: 03/19/17 22:01


   Last Admin: 03/19/17 11:14 Dose:  40 meq








A/P


Pneumonia


Parapneumonic Pleural Effusion


Atelectasis


HTN


Hypothyroidism





-  repeat CXR today


-  continue antibiotics


-  monitor fever curve, WBC trend


-  f/u pleural fluid cultures


-  pain control


-  DVT prophylaxis

## 2017-03-20 LAB
ANION GAP SERPL CALC-SCNC: 6 MMOL/L (ref 8–16)
CALCIUM SERPL-MCNC: 7.9 MG/DL (ref 8.5–10.1)
CO2 SERPL-SCNC: 31 MMOL/L (ref 21–32)
CREAT SERPL-MCNC: 0.5 MG/DL (ref 0.55–1.02)
DEPRECATED RDW RBC AUTO: 14.1 % (ref 11.6–15.6)
GLUCOSE SERPL-MCNC: 98 MG/DL (ref 74–106)
MCH RBC QN AUTO: 29 PG (ref 25.7–33.7)
MCHC RBC AUTO-ENTMCNC: 34.4 G/DL (ref 32–36)
MCV RBC: 84.4 FL (ref 80–96)
PLATELET # BLD AUTO: 367 K/MM3 (ref 134–434)
PMV BLD: 7.5 FL (ref 7.5–11.1)
WBC # BLD AUTO: 7.2 K/MM3 (ref 4–10)

## 2017-03-20 RX ADMIN — ACETAMINOPHEN PRN MG: 325 TABLET ORAL at 22:31

## 2017-03-20 RX ADMIN — ERTAPENEM SODIUM SCH MLS/HR: 1 INJECTION, POWDER, LYOPHILIZED, FOR SOLUTION INTRAMUSCULAR; INTRAVENOUS at 16:29

## 2017-03-20 RX ADMIN — LOSARTAN POTASSIUM SCH MG: 50 TABLET, FILM COATED ORAL at 09:00

## 2017-03-20 RX ADMIN — HEPARIN SODIUM SCH UNIT: 5000 INJECTION, SOLUTION INTRAVENOUS; SUBCUTANEOUS at 13:35

## 2017-03-20 RX ADMIN — CEFTRIAXONE SODIUM SCH MLS/HR: 2 INJECTION, POWDER, FOR SOLUTION INTRAMUSCULAR; INTRAVENOUS at 09:01

## 2017-03-20 RX ADMIN — AMLODIPINE BESYLATE SCH MG: 10 TABLET ORAL at 09:00

## 2017-03-20 RX ADMIN — AZITHROMYCIN DIHYDRATE SCH MLS/HR: 500 INJECTION, POWDER, LYOPHILIZED, FOR SOLUTION INTRAVENOUS at 09:00

## 2017-03-20 RX ADMIN — LEVOTHYROXINE SODIUM SCH MCG: 125 TABLET ORAL at 06:03

## 2017-03-20 RX ADMIN — HEPARIN SODIUM SCH: 5000 INJECTION, SOLUTION INTRAVENOUS; SUBCUTANEOUS at 21:15

## 2017-03-20 RX ADMIN — HYDROCHLOROTHIAZIDE SCH MG: 12.5 CAPSULE ORAL at 09:00

## 2017-03-20 RX ADMIN — HEPARIN SODIUM SCH UNIT: 5000 INJECTION, SOLUTION INTRAVENOUS; SUBCUTANEOUS at 06:03

## 2017-03-20 NOTE — PN
Progress Note (short form)





- Note


Progress Note: 


ID  addendum








laparoscopic repair of incarcerated ventral hermnia in 2014.  Now with pleural 

effusion increasing in size without any infiltrate seen


CT reviewed once again with Dr Pond noting that there may be a tract from 

the pleura to the subdiaphragmatic space reactive area


This raises the question of a below the diaphragm inflammatory process  This 

suggests the coverage may need to be modified for bowel margret


Repeat aspiration today.  Would have her general surgeon see her in follow up  

and change to Ertepenem.





Dayna EASTMAN

## 2017-03-20 NOTE — PN
Physical Exam: 


SUBJECTIVE: Patient seen and examined at bedside. States her pain is improved 

but has moved more superiorly 


today she states she gets tired faster when ambulating 





OBJECTIVE:





 Vital Signs











 Period  Temp  Pulse  Resp  BP Sys/Olivares  Pulse Ox


 


 Last 24 Hr  97.9 F-99.3 F  82-97  18-20  113-134/62-80  96-96











GENERAL: The patient is awake, alert, and fully oriented, in no acute distress. 


HEAD: Normal with no signs of trauma. 


NECK:  supple. 


LUNGS:  diminished breath sounds on the right with crackles left side clear 


HEART: Regular rate and rhythm, S1, S2 without murmur, rub or gallop.


ABDOMEN: mild right sided abdominal tenderness-continues to improve but still 

with some right flank tenderness- no chest tenderness . ABD soft non distended 


NEUROLOGICAL: Cranial nerves II through XII grossly intact. Normal speech 


PSYCH: Normal mood, normal affect.














 Laboratory Results - last 24 hr











  03/20/17 03/20/17





  06:20 06:20


 


WBC  7.2 


 


RBC  3.63 


 


Hgb  10.5 L 


 


Hct  30.6 L 


 


MCV  84.4 


 


MCHC  34.4 


 


RDW  14.1 


 


Plt Count  367 


 


MPV  7.5 


 


Sodium   136


 


Potassium   4.0


 


Chloride   99


 


Carbon Dioxide   31


 


Anion Gap   6 L


 


BUN   7


 


Creatinine   0.5 L D


 


Random Glucose   98


 


Calcium   7.9 L








Active Medications











Generic Name Dose Route Start Last Admin





  Trade Name Freq  PRN Reason Stop Dose Admin


 


Acetaminophen  650 mg 03/16/17 03:42 03/18/17 22:14





  Tylenol -  PO   650 mg





  Q6H PRN   Administration





  FEVER OR PAIN   


 


Amlodipine Besylate  10 mg 03/16/17 10:00 03/20/17 09:00





  Norvasc -  PO   10 mg





  DAILY ROBIN   Administration


 


Heparin Sodium (Porcine)  5,000 unit 03/16/17 22:00 03/20/17 13:35





  Heparin -  SQ   5,000 unit





  TID ROBIN   Administration


 


Hydrochlorothiazide  12.5 mg 03/16/17 11:00 03/20/17 09:00





  Hctz -  PO   12.5 mg





  DAILY ROBIN   Administration


 


Ceftriaxone Sodium  100 mls @ 200 mls/hr 03/16/17 15:00 03/20/17 09:01





  Rocephin 2gm Ivpb (Pre-Docked)  IVPB   200 mls/hr





  DAILY ROBIN   Administration


 


Azithromycin  250 mls @ 250 mls/hr 03/16/17 15:00 03/20/17 09:00





  Zithromax 500mg Ivpb (Pre-Docked)  IVPB   250 mls/hr





  DAILY ROBIN   Administration


 


Levothyroxine Sodium  125 mcg 03/16/17 07:00 03/20/17 06:03





  Synthroid -  PO   125 mcg





  DAILY@0700 ROBIN   Administration


 


Losartan Potassium  50 mg 03/16/17 10:00 03/20/17 09:00





  Cozaar -  PO   50 mg





  DAILY ROBIN   Administration











ASSESSMENT/PLAN:


57 y/o F w/PMH of HTN, hypothyroidism presented to ER w/ R sided chest/

abdominal pain x 2 days. Found to have R pleural effusion.





Right pleural effusion/Community acquired pneumonia/Sepsis (leukocytosis and 

fever): community acquired pneumonia with parapneumonic effusion with pleuritic 

chest pain. 


s/p thoracentesis 


patient now has reaccumulation of fluid pulmonary requesting repeat 

thoracentesis 


ID consult appreciated 


Azithromycin and Ceftriaxone day 5 


pleural fluid studies-shows it is exudative likely parapneuomonic effusion


Afebrile for 24 hours 


f/u pleural fluid studies is not revealing any infectious source 


pulmonary consult appreciated   





abdominal wall mass: this was seen on chest CT also read as a mass on abdominal 

CT after reviewing the scans with radiology they do not feel there is a mass in 

the liver. it is likely part of the abdominal wall vs a dropped stone from her 

cholecystectomy 


LFTs WNL 


will see if right flank/chest/abdominal pain improves with treatment of 

pneumonia-so far it is 


If patient is still having fevers chills and does not improve in the next 1-2 

days or so an upper abdominal MRI with gadolinium may be of value per Dr. Burks 





HTN


continue Home meds Norvasc Losartan and HCTZ





Hypothyroidism


Synthroid 125mcg qam 





-FEN


no IVF 


hypokalemia resolved  


Low sodium diet 





PPx:


HSQ/SCDs


No criteria met for GI PPx


no PT evaluation needed 





HLOC  








Visit type





- Emergency Visit


Emergency Visit: Yes


ED Registration Date: 03/15/17


Care time: The patient presented to the Emergency Department on the above date 

and was hospitalized for further evaluation of their emergent condition.





- New Patient


This patient is new to me today: No





- Critical Care


Critical Care patient: No

## 2017-03-20 NOTE — PN
Progress Note, Physician


History of Present Illness: 


PULMONARY,-CP





FEELING BETTER,LESS DYSPNEIC,LESS CP





- Current Medication List


Current Medications: 


Active Medications





Acetaminophen (Tylenol -)  650 mg PO Q6H PRN


   PRN Reason: FEVER OR PAIN


   Last Admin: 03/18/17 22:14 Dose:  650 mg


Amlodipine Besylate (Norvasc -)  10 mg PO DAILY [facility]


   Last Admin: 03/20/17 09:00 Dose:  10 mg


Heparin Sodium (Porcine) (Heparin -)  5,000 unit SQ TID [facility]


   Last Admin: 03/20/17 06:03 Dose:  5,000 unit


Hydrochlorothiazide (Hctz -)  12.5 mg PO DAILY [facility]


   Last Admin: 03/20/17 09:00 Dose:  12.5 mg


Ceftriaxone Sodium (Rocephin 2gm Ivpb (Pre-Docked))  100 mls @ 200 mls/hr IVPB 

DAILY [facility]


   Last Admin: 03/20/17 09:01 Dose:  200 mls/hr


Azithromycin (Zithromax 500mg Ivpb (Pre-Docked))  250 mls @ 250 mls/hr IVPB 

DAILY [facility]


   Last Admin: 03/20/17 09:00 Dose:  250 mls/hr


Levothyroxine Sodium (Synthroid -)  125 mcg PO DAILY@0700 [facility]


   Last Admin: 03/20/17 06:03 Dose:  125 mcg


Losartan Potassium (Cozaar -)  50 mg PO DAILY [facility]


   Last Admin: 03/20/17 09:00 Dose:  50 mg











- Objective


Vital Signs: 


 Vital Signs











Temperature  97.9 F   03/20/17 07:53


 


Pulse Rate  97 H  03/20/17 07:53


 


Respiratory Rate  18   03/20/17 07:53


 


Blood Pressure  113/80   03/20/17 07:53


 


O2 Sat by Pulse Oximetry (%)  96   03/20/17 09:00











Constitutional: Yes: Well Nourished, Calm


Eyes: Yes: WNL


HENT: Yes: WNL


Neck: Yes: WNL


Cardiovascular: Yes: Regular Rate and Rhythm, S1, S2


Respiratory: Yes: CTA Bilaterally


Gastrointestinal: Yes: WNL


Extremities: Yes: WNL


Edema: No


Labs: 


 CBC, BMP





 03/20/17 06:20 





 03/20/17 06:20 





 INR, PTT











INR  1.54  (0.82-1.09)  H  03/16/17  08:05    


 


Fibrinogen  512.0 mg/dL (238-498)  H  03/16/17  08:05    














- ....Imaging


Chest X-ray: Report Reviewed, Image Reviewed





Assessment/Plan


A/P


Pneumonia


Parapneumonic Pleural Effusion


Atelectasis


HTN


Hypothyroidism


Chest wall mass








-  continue antibiotics


-  monitor fever curve, WBC trend


-  pain control


-  DVT prophylaxis


-  repeat thoracentesis today








DR CALI

## 2017-03-20 NOTE — PN
Teaching Attending Note


Name of Resident: Dirk Mars


ATTENDING PHYSICIAN STATEMENT





I saw and evaluated the patient.


I reviewed the resident's note and discussed the case with the resident.


I agree with the resident's findings and plan as documented.





 Vital Signs











Temperature  97.9 F   03/20/17 07:53


 


Pulse Rate  97 H  03/20/17 07:53


 


Respiratory Rate  18   03/20/17 07:53


 


Blood Pressure  113/80   03/20/17 07:53


 


O2 Sat by Pulse Oximetry (%)  96   03/20/17 09:00








 CBCD











WBC  7.2 K/mm3 (4.0-10.0)   03/20/17  06:20    


 


RBC  3.63 M/mm3 (3.60-5.2)   03/20/17  06:20    


 


Hgb  10.5 GM/dL (10.7-15.3)  L  03/20/17  06:20    


 


Hct  30.6 % (32.4-45.2)  L  03/20/17  06:20    


 


MCV  84.4 fl (80-96)   03/20/17  06:20    


 


MCHC  34.4 g/dl (32.0-36.0)   03/20/17  06:20    


 


RDW  14.1 % (11.6-15.6)   03/20/17  06:20    


 


Plt Count  367 K/MM3 (134-434)   03/20/17  06:20    


 


MPV  7.5 fl (7.5-11.1)   03/20/17  06:20    








 CMP











Sodium  136 mmol/L (136-145)   03/20/17  06:20    


 


Potassium  4.0 mmol/L (3.5-5.1)   03/20/17  06:20    


 


Chloride  99 mmol/L ()   03/20/17  06:20    


 


Carbon Dioxide  31 mmol/L (21-32)   03/20/17  06:20    


 


Anion Gap  6  (8-16)  L  03/20/17  06:20    


 


BUN  7 mg/dL (7-18)   03/20/17  06:20    


 


Creatinine  0.5 mg/dL (0.55-1.02)  L D 03/20/17  06:20    


 


Creat Clearance w eGFR  > 60  (>60)   03/17/17  07:00    


 


Random Glucose  98 mg/dL ()   03/20/17  06:20    


 


Calcium  7.9 mg/dL (8.5-10.1)  L  03/20/17  06:20    


 


Total Bilirubin  0.6 mg/dL (0.2-1.0)  D 03/17/17  07:00    


 


AST  12 U/L (15-37)  L  03/17/17  07:00    


 


ALT  12 U/L (12-78)   03/17/17  07:00    


 


Alkaline Phosphatase  74 U/L ()   03/17/17  07:00    


 


Total Protein  6.6 g/dl (6.4-8.2)   03/17/17  07:00    


 


Albumin  2.7 g/dl (3.4-5.0)  L  03/17/17  07:00    








 CARDIAC ENZYMES











Creatine Kinase  41 IU/L ()   03/15/17  18:02    


 


Troponin I  < 0.02 ng/ml (0.00-0.05)   03/15/17  18:02    








 Current Medications











Generic Name Dose Route Start Last Admin





  Trade Name Freq  PRN Reason Stop Dose Admin


 


Acetaminophen  650 mg 03/16/17 03:42 03/18/17 22:14





  Tylenol -  PO   650 mg





  Q6H PRN   Administration





  FEVER OR PAIN   


 


Amlodipine Besylate  10 mg 03/16/17 10:00 03/20/17 09:00





  Norvasc -  PO   10 mg





  DAILY ROBIN   Administration


 


Heparin Sodium (Porcine)  5,000 unit 03/16/17 22:00 03/20/17 13:35





  Heparin -  SQ   5,000 unit





  TID ROBIN   Administration


 


Hydrochlorothiazide  12.5 mg 03/16/17 11:00 03/20/17 09:00





  Hctz -  PO   12.5 mg





  DAILY ROBIN   Administration


 


Ceftriaxone Sodium  100 mls @ 200 mls/hr 03/16/17 15:00 03/20/17 09:01





  Rocephin 2gm Ivpb (Pre-Docked)  IVPB   200 mls/hr





  DAILY ROBIN   Administration


 


Azithromycin  250 mls @ 250 mls/hr 03/16/17 15:00 03/20/17 09:00





  Zithromax 500mg Ivpb (Pre-Docked)  IVPB   250 mls/hr





  DAILY ROBIN   Administration


 


Levothyroxine Sodium  125 mcg 03/16/17 07:00 03/20/17 06:03





  Synthroid -  PO   125 mcg





  DAILY@0700 ROBIN   Administration


 


Losartan Potassium  50 mg 03/16/17 10:00 03/20/17 09:00





  Cozaar -  PO   50 mg





  DAILY ROBIN   Administration








 Home Medications











 Medication  Instructions  Recorded


 


Levothyroxine [Synthroid -] 125 mcg PO DAILY 09/27/14


 


Amlodipine Besylate [Norvasc -] 10 mg PO DAILY 03/15/17


 


Losartan/Hydrochlorothiazide 1 each PO DAILY 03/15/17





[Losartan-Hctz 50-12.5 mg Tab]  


 


Methocarbamol [Robaxin -] 500 mg PO DAILY PRN 03/15/17

















ASSESSMENT AND PLAN:


Patient is a 55 y/o F w/PMH of HTN, hypothyroidism presented to ER w/ R sided 

chest/abdominal pain x 2 days. Found to have R pleural effusion and thought to 

have a liver mass.





# Acute Right pleural effusion s/p thoracentesis by IR for diagnostic purposes ;

pleural fluid studies-shows it is exudative likely parapneuomonic effusion. 

Reaccumulation of fluid pulmonary requesting repeat thoracentesis .ID consult 

appreciated CT of abdomen was reviewed and was reported as a liver mass, after 

reviewing the scans with radiology they do not feel they have seen a mass. 

repeat  LFTs are within nl limits  





#Community acquired pneumonia On Rocephin and Zithromax ,day #5 as per ID will 

continue, pulmonary consult appreciated   





# Acute sepsis (leukocytosis and fever): after evaluation by ID it is thought 

she has a pneumonia with pleuretic chest pain. will continue IV antibiotic  





#HTN continue Home emds Norvasc Losartan and HCTZ





#Hypothyroidism Syntrhoid 125mcg qam 





DVT px; hEPARIN SQ, scd, EARLY AMBULATION 


f/u pleural fluid studies is not revealing any infectious source

## 2017-03-20 NOTE — PN
Progress Note, Physician


Chief Complaint: 


The patient was alert and oriented, in no acute distress. She complains of  

constant, sharp pain on her right lower back but definitely better. She rated 

the pain a 5/10 in intensity, and that it is worse with inspiration or when she 

coughs.  She still has a non-productive cough and some SOB. She has a mild 

headache. She denies fever, chills, nausea, vomiting, diarrhea, constipation, 

chest pain, palpitations or abd pain.


Remains on antibiotics


 





- Current Medication List


Current Medications: 


Active Medications





Acetaminophen (Tylenol -)  650 mg PO Q6H PRN


   PRN Reason: FEVER OR PAIN


   Last Admin: 03/18/17 22:14 Dose:  650 mg


Amlodipine Besylate (Norvasc -)  10 mg PO DAILY [facility]


   Last Admin: 03/20/17 09:00 Dose:  10 mg


Heparin Sodium (Porcine) (Heparin -)  5,000 unit SQ TID [facility]


   Last Admin: 03/20/17 13:35 Dose:  5,000 unit


Hydrochlorothiazide (Hctz -)  12.5 mg PO DAILY [facility]


   Last Admin: 03/20/17 09:00 Dose:  12.5 mg


Ceftriaxone Sodium (Rocephin 2gm Ivpb (Pre-Docked))  100 mls @ 200 mls/hr IVPB 

DAILY [facility]


   Last Admin: 03/20/17 09:01 Dose:  200 mls/hr


Azithromycin (Zithromax 500mg Ivpb (Pre-Docked))  250 mls @ 250 mls/hr IVPB 

DAILY [facility]


   Last Admin: 03/20/17 09:00 Dose:  250 mls/hr


Levothyroxine Sodium (Synthroid -)  125 mcg PO DAILY@0700 [facility]


   Last Admin: 03/20/17 06:03 Dose:  125 mcg


Losartan Potassium (Cozaar -)  50 mg PO DAILY [facility]


   Last Admin: 03/20/17 09:00 Dose:  50 mg











- Objective


Vital Signs: 


 Vital Signs











Temperature  97.9 F   03/20/17 07:53


 


Pulse Rate  97 H  03/20/17 07:53


 


Respiratory Rate  18   03/20/17 07:53


 


Blood Pressure  113/80   03/20/17 07:53


 


O2 Sat by Pulse Oximetry (%)  96   03/20/17 09:00











Constitutional: Yes: Well Nourished, No Distress, Calm, Obese


HENT: Yes: WNL


Cardiovascular: Yes: Regular Rate and Rhythm, S1, S2


Respiratory: Yes: WNL, Regular, CTA Bilaterally, Diminished, Dullness


Gastrointestinal: Yes: Soft.  No: Tenderness


Edema: No


Labs: 


 CBC, BMP





 03/20/17 06:20 





 03/20/17 06:20 





 INR, PTT











INR  1.54  (0.82-1.09)  H  03/16/17  08:05    


 


Fibrinogen  512.0 mg/dL (238-498)  H  03/16/17  08:05    














Assessment/Plan


Microbiology





03/16/17 11:25   Pleural Fluid   Gram Stain - Final


03/16/17 11:25   Pleural Fluid   Anaerobic Culture - Final


                              NO GROWTH OF AEROBIC ORGANISMS AFTER 48 HOURS 

INCUBATION


                              NO ANAEROBES WERE ISOLATED


03/18/17 22:37   Blood - Peripheral Venous   Blood Culture - Preliminary


                              NO GROWTH OBTAINED AFTER 24 HOURS, INCUBATION TO 

CONTINUE


                              FOR 4 DAYS.


03/18/17 22:37   Blood - Peripheral Venous   Blood Culture - Preliminary


                              NO GROWTH OBTAINED AFTER 24 HOURS, INCUBATION TO 

CONTINUE


                              FOR 4 DAYS.


03/16/17 15:30   Blood - Peripheral Venous   TB Test (QFT) ([facility]) - Preliminary





 Laboratory Tests











  03/16/17 03/16/17 03/17/17





  15:15 15:30 07:00


 


WBC   


 


Hgb   


 


Hct   


 


Plt Count   


 


ESR    15


 


BUN   


 


Creatinine   


 


C-Reactive Protein   21.5 H 


 


HIV 1&2 Antibody Screen  Negative  


 


HIV P24 Antigen  Negative  














  03/20/17 03/20/17





  06:20 06:20


 


WBC  7.2 


 


Hgb  10.5 L 


 


Hct  30.6 L 


 


Plt Count  367 


 


ESR  


 


BUN   7


 


Creatinine   0.5 L D


 


C-Reactive Protein  


 


HIV 1&2 Antibody Screen  


 


HIV P24 Antigen  








Assessment Parapneumonic effsusion clinical improvement noted No fever anymore 

pain better


                       No growth on culture Cytology negative





Advise


IV antibiotic today


Obtain a decubitus xray to see if fluid layers out If so it will take time for 

this to resolve


Perhaps oral antibiotics


Dayna EASTMAN

## 2017-03-21 LAB
APPEARANCE PLR: (no result)
BASOPHILS NFR PLR MANUAL: 1 %
CHLORIDE PLR-SCNC: 100 MMOL/L
COLOR PLR: (no result)
DEPRECATED RDW RBC AUTO: 14.1 % (ref 11.6–15.6)
GLUCOSE PLR-MCNC: 71.84 MG/DL
LDH PLR-CCNC: 873 U/L
LYMPHOCYTES NFR PLR MANUAL: 70 %
MACROPHAGES NFR PLR MANUAL: 17 %
MCH RBC QN AUTO: 29.1 PG (ref 25.7–33.7)
MCHC RBC AUTO-ENTMCNC: 34.3 G/DL (ref 32–36)
MCV RBC: 85 FL (ref 80–96)
NEUTROPHILS NFR PLR MANUAL: 8 %
PLATELET # BLD AUTO: 393 K/MM3 (ref 134–434)
PLEURAL FLUID SOURCE: (no result)
PMV BLD: 7.2 FL (ref 7.5–11.1)
PROT PLR-MCNC: 5.24 G/L
WBC # BLD AUTO: 7.2 K/MM3 (ref 4–10)

## 2017-03-21 PROCEDURE — 0W993ZX DRAINAGE OF RIGHT PLEURAL CAVITY, PERCUTANEOUS APPROACH, DIAGNOSTIC: ICD-10-PCS | Performed by: RADIOLOGY

## 2017-03-21 PROCEDURE — 0BBR3ZX: ICD-10-PCS | Performed by: RADIOLOGY

## 2017-03-21 RX ADMIN — LOSARTAN POTASSIUM SCH MG: 50 TABLET, FILM COATED ORAL at 11:10

## 2017-03-21 RX ADMIN — HEPARIN SODIUM SCH: 5000 INJECTION, SOLUTION INTRAVENOUS; SUBCUTANEOUS at 13:47

## 2017-03-21 RX ADMIN — HYDROCHLOROTHIAZIDE SCH MG: 12.5 CAPSULE ORAL at 11:10

## 2017-03-21 RX ADMIN — LEVOTHYROXINE SODIUM SCH MCG: 125 TABLET ORAL at 06:30

## 2017-03-21 RX ADMIN — AMLODIPINE BESYLATE SCH MG: 10 TABLET ORAL at 11:10

## 2017-03-21 RX ADMIN — HEPARIN SODIUM SCH: 5000 INJECTION, SOLUTION INTRAVENOUS; SUBCUTANEOUS at 05:20

## 2017-03-21 RX ADMIN — ERTAPENEM SODIUM SCH MLS/HR: 1 INJECTION, POWDER, LYOPHILIZED, FOR SOLUTION INTRAMUSCULAR; INTRAVENOUS at 10:36

## 2017-03-21 NOTE — PN
Progress Note (short form)





- Note


Progress Note: 


PULMONARY





s/p repeat thoracentesis, reports pain with drainage this time. No fevers 

recorded.





 Last Vital Signs











Temp Pulse Resp BP Pulse Ox


 


 98.1 F   95 H  18   122/63   96 


 


 03/21/17 09:32  03/21/17 11:11  03/21/17 11:11  03/21/17 11:11  03/20/17 22:00











Gen:  NAD at rest


Heart:  RRR


Lung: clear to auscultation


Abd: soft, nontender


Ext: no edema





 CBC, BMP





 03/21/17 06:50 





 03/20/17 06:20 





Active Medications





Acetaminophen (Tylenol -)  650 mg PO Q6H PRN


   PRN Reason: FEVER OR PAIN


   Last Admin: 03/20/17 22:31 Dose:  650 mg


Amlodipine Besylate (Norvasc -)  10 mg PO DAILY [facility]


   Last Admin: 03/21/17 11:10 Dose:  10 mg


Heparin Sodium (Porcine) (Heparin -)  5,000 unit SQ TID [facility]


   Last Admin: 03/21/17 13:47 Dose:  Not Given


Hydrochlorothiazide (Hctz -)  12.5 mg PO DAILY [facility]


   Last Admin: 03/21/17 11:10 Dose:  12.5 mg


Ertapenem 1 gm/ Sodium (Chloride)  50 mls @ 100 mls/hr IVPB DAILY [facility]


   Last Admin: 03/21/17 10:36 Dose:  100 mls/hr


Levothyroxine Sodium (Synthroid -)  125 mcg PO DAILY@0700 [facility]


   Last Admin: 03/21/17 06:30 Dose:  125 mcg


Losartan Potassium (Cozaar -)  50 mg PO DAILY [facility]


   Last Admin: 03/21/17 11:10 Dose:  50 mg








A/P


Pneumonia


Parapneumonic Pleural Effusion


Atelectasis


HTN


Hypothyroidism





-  f/u pleural fluid chemistries/cultures


-  repeat CXR in AM


-  may need thoracic evaluation/VATS if no improvement in exudative effusion


-  continue antibiotics


-  monitor fever curve, WBC trend


-  pain control


-  DVT prophylaxis

## 2017-03-21 NOTE — PN
Physical Exam: 


SUBJECTIVE: Patient seen and examined at bedside this morning at 845AM 


patient crying in bed states she did not have a good night sleep she is having 

right ear pain 


short of breath when ambulating 





OBJECTIVE:





 Vital Signs











 Period  Temp  Pulse  Resp  BP Sys/Olivares  Pulse Ox


 


 Last 24 Hr  98.1 F-98.8 F  86-96  18-20  101-122/62-76  96














GENERAL: The patient is awake, alert, and fully oriented, in no acute distress. 


HEAD: Normal with no signs of trauma. 


NECK:  supple. 


LUNGS:  diminished breath sounds on the right with crackles left side clear 


HEART: Regular rate and rhythm, S1, S2 without murmur, rub or gallop.


ABDOMEN:right sided abdominal tenderness- still with some right flank tenderness

- no chest tenderness . ABD soft non distended 


NEUROLOGICAL: Cranial nerves II through XII grossly intact. Normal speech 


PSYCH: Normal mood, normal affect.

















 Laboratory Results - last 24 hr











  03/21/17





  06:50


 


WBC  7.2


 


RBC  3.59 L


 


Hgb  10.4 L


 


Hct  30.5 L


 


MCV  85.0


 


MCHC  34.3


 


RDW  14.1


 


Plt Count  393


 


MPV  7.2 L








Active Medications











Generic Name Dose Route Start Last Admin





  Trade Name Freq  PRN Reason Stop Dose Admin


 


Acetaminophen  650 mg 03/16/17 03:42 03/20/17 22:31





  Tylenol -  PO   650 mg





  Q6H PRN   Administration





  FEVER OR PAIN   


 


Amlodipine Besylate  10 mg 03/16/17 10:00 03/21/17 11:10





  Norvasc -  PO   10 mg





  DAILY ROBIN   Administration


 


Heparin Sodium (Porcine)  5,000 unit 03/16/17 22:00 03/21/17 13:47





  Heparin -  SQ   Not Given





  TID ROBIN   


 


Hydrochlorothiazide  12.5 mg 03/16/17 11:00 03/21/17 11:10





  Hctz -  PO   12.5 mg





  DAILY ROBIN   Administration


 


Ertapenem 1 gm/ Sodium  50 mls @ 100 mls/hr 03/20/17 16:00 03/21/17 10:36





  Chloride  IVPB   100 mls/hr





  DAILY ROBIN   Administration


 


Levothyroxine Sodium  125 mcg 03/16/17 07:00 03/21/17 06:30





  Synthroid -  PO   125 mcg





  DAILY@0700 ROIBN   Administration


 


Losartan Potassium  50 mg 03/16/17 10:00 03/21/17 11:10





  Cozaar -  PO   50 mg





  DAILY ROBIN   Administration











ASSESSMENT/PLAN:


55 y/o F w/PMH of HTN, hypothyroidism presented to ER w/ R sided chest/

abdominal pain x 2 days. Found to have R pleural effusion.





Right pleural effusion/Community acquired pneumonia/Sepsis (leukocytosis and 

fever): community acquired pneumonia with parapneumonic effusion with pleuritic 

chest pain. 


s/p thoracentesis 


patient now has reaccumulation of fluid pulmonary requesting repeat 

thoracentesis- will repeat thoracentesis today 


ID consult appreciated 


Azithromycin and Ceftriaxone stopped on day 5 


started on ertapenem day 2 given possible stone dropped in abdomen during 

cholecystectomy 


pleural fluid studies-shows inital thoracentesis was exudative likely 

parapneuomonic effusion


Afebrile 


f/u pleural fluid studies is not revealing any infectious source 


pulmonary consult appreciated   





abdominal wall mass: this was seen on chest CT also read as a mass on abdominal 

CT after reviewing the scans with radiology they do not feel there is a mass in 

the liver. it is likely part of the abdominal wall vs a dropped stone from her 

cholecystectomy 


LFTs WNL 


will see if right flank/chest/abdominal pain improves with treatment of 

pneumonia-no significant improvement 


will discuss with Dr. Burks if she should have the mass seen on CT scan 

biopsied if not will order upper abdominal MRI with gadalinium





HTN


continue Home meds Norvasc Losartan and HCTZ





Hypothyroidism


Synthroid 125mcg qam 





-FEN


no IVF 


no electrolyte issues  


Low sodium diet 





PPx:


HSQ/SCDs


No criteria met for GI PPx


no PT evaluation needed at this time 





HLOC  





Visit type





- Emergency Visit


Emergency Visit: Yes


ED Registration Date: 03/15/17


Care time: The patient presented to the Emergency Department on the above date 

and was hospitalized for further evaluation of their emergent condition.





- New Patient


This patient is new to me today: No





- Critical Care


Critical Care patient: No

## 2017-03-21 NOTE — PN
Teaching Attending Note


Name of Resident: Dirk Mars


ATTENDING PHYSICIAN STATEMENT





I saw and evaluated the patient.


I reviewed the resident's note and discussed the case with the resident.


I agree with the resident's findings and plan as documented.








SUBJECTIVE:


felt SOB over night , now better after thoracocentesis this am .  no CP , but 

pain at site of needle 








OBJECTIVE:


NAD 


CV: RRR


Lungs : CTAB , decreased breath sounds at R base 


Ext : no edema on LE 





ASSESSMENT AND PLAN:





Patient is a 57 y/o F w/PMH of HTN, hypothyroidism presented to ER w/ R sided 

chest/abdominal pain x 2 days. Found to have R pleural effusion , Treated for 

CAP 





1- Exudative R pleural effusion :  possible parapneumonic  , but also with the 

subdiaphramatic  mass, there is possibly a communication . 


cytology neg and cx neg. 





- cont ertapenem .


- follow cx from the mass 


- Repeat cxray in am . if fluid re- accumulate , then might need a pig tail 

placement or pleurodesis 





2- sub-diaphragmatic mass, of unclear etiology. infectious vs malignancy. 


 - follow bx done today 





3- HTN : cont home meds





dispo : HLOC

## 2017-03-21 NOTE — PN
Progress Note, Physician


History of Present Illness: 


S/P repeat thoracentesis


No c/o pain  No c/o dyspnea


Occasional cough


No fever/ chills








- Current Medication List


Current Medications: 


Active Medications





Acetaminophen (Tylenol -)  650 mg PO Q6H PRN


   PRN Reason: FEVER OR PAIN


   Last Admin: 03/20/17 22:31 Dose:  650 mg


Amlodipine Besylate (Norvasc -)  10 mg PO DAILY [facility]


   Last Admin: 03/21/17 11:10 Dose:  10 mg


Heparin Sodium (Porcine) (Heparin -)  5,000 unit SQ TID [facility]


   Last Admin: 03/21/17 13:47 Dose:  Not Given


Hydrochlorothiazide (Hctz -)  12.5 mg PO DAILY [facility]


   Last Admin: 03/21/17 11:10 Dose:  12.5 mg


Ertapenem 1 gm/ Sodium (Chloride)  50 mls @ 100 mls/hr IVPB DAILY [facility]


   Last Admin: 03/21/17 10:36 Dose:  100 mls/hr


Levothyroxine Sodium (Synthroid -)  125 mcg PO DAILY@0700 [facility]


   Last Admin: 03/21/17 06:30 Dose:  125 mcg


Losartan Potassium (Cozaar -)  50 mg PO DAILY [facility]


   Last Admin: 03/21/17 11:10 Dose:  50 mg











- Objective


Vital Signs: 


 Vital Signs











Temperature  98.1 F   03/21/17 09:32


 


Pulse Rate  95 H  03/21/17 11:11


 


Respiratory Rate  18   03/21/17 11:11


 


Blood Pressure  122/63   03/21/17 11:11


 


O2 Sat by Pulse Oximetry (%)  96   03/20/17 22:00











Constitutional: Yes: No Distress


Eyes: Yes: Conjunctiva Clear


Cardiovascular: Yes: Regular Rate and Rhythm, S1, S2


Respiratory: Yes: Diminished


Gastrointestinal: Yes: Normal Bowel Sounds, Soft, Abdomen, Obese.  No: 

Tenderness


Labs: 


 CBC, BMP





 03/21/17 06:50 





 03/20/17 06:20 





 INR, PTT











INR  1.54  (0.82-1.09)  H  03/16/17  08:05    


 


Fibrinogen  512.0 mg/dL (238-498)  H  03/16/17  08:05    














Assessment/Plan


Pleural effusion  S/P thoracentesis


Possible subdiaphragmatic communication





Await routine, AFB, Fungal c/s


Continue ertapenem

## 2017-03-21 NOTE — MSN
Progress Note (short form)





- Note


Progress Note: 


SUBJECTIVE: Pt seen and examined at bedside. Pt states she did "not have a good 

night" and appears to be upset. She had ear pain with blurriness of vision and 

lightheadedness overnight when she was in the bathroom. Denies previous Hx of 

similar Sx. Also admits to SOB with associated sharp CP over the lower sternum 

and R side of her chest. Pt says she felt febrile and experienced chills 

overnight, however, there were no recorded fevers. At bedside this morning, pt 

states ear pain is still present however less in severity. Denies CP or SOB. 

Admits to persistence of posterior R flank pain. 


 


 Active Medications








Generic Name Dose Route Start Last Admin





  Trade Name Freq  PRN Reason Stop Dose Admin


 


Acetaminophen  650 mg 03/16/17 03:42 03/20/17 22:31





  Tylenol -  PO   650 mg





  Q6H PRN   Administration





  FEVER OR PAIN   


 


Amlodipine Besylate  10 mg 03/16/17 10:00 03/21/17 11:10





  Norvasc -  PO   10 mg





  DAILY ROBIN   Administration


 


Heparin Sodium (Porcine)  5,000 unit 03/16/17 22:00 03/21/17 13:47





  Heparin -  SQ   Not Given





  TID ROBIN   


 


Hydrochlorothiazide  12.5 mg 03/16/17 11:00 03/21/17 11:10





  Hctz -  PO   12.5 mg





  DAILY ROBIN   Administration


 


Ertapenem 1 gm/ Sodium  50 mls @ 100 mls/hr 03/20/17 16:00 03/21/17 10:36





  Chloride  IVPB   100 mls/hr





  DAILY ROBIN   Administration


 


Levothyroxine Sodium  125 mcg 03/16/17 07:00 03/21/17 06:30





  Synthroid -  PO   125 mcg





  DAILY@0700 ROBIN   Administration


 


Losartan Potassium  50 mg 03/16/17 10:00 03/21/17 11:10





  Cozaar -  PO   50 mg





  DAILY ROBIN   Administration











OBJECTIVE:


 Vital Signs








 Period  Temp  Pulse  Resp  BP Sys/Olivares  Pulse Ox


 


 Last 24 Hr  98.1 F-98.8 F  86-96  18-20  101-122/62-76  96








GENERAL: AAOx3 in no respiratory distress


HEAD: Normocephalic, atraumatic. PERRLA, EOMI


NECK: No JVD


HEART: Regular rate and rhythm. +S1/S2. No murmurs/rubs/gallops


LUNGS: CTAB with decreased breath sounds over entire R lobe. No rales/rhonchi/

wheezing


ABDOMEN: Soft, nondistended, and no tenderness to palpation over anterior 

abdomen. TTP over R flank. Dullness to percussion. Normal bowel sounds 4/4


EXT: BL LE warm, +2 DP and +2 radial palpated, no edema in BL LE


NEURO: Normal speech, CN2-12 intact, motor 5/5 BL arm abduction/flexion/

extension and 5/5 BL hip flexion, leg extension, PF/DF, sensation grossly 

intact 





 CBC








WBC  7.2 K/mm3 (4.0-10.0)   03/21/17  06:50    


 


Corrected WBC (auto)  Cancelled   03/15/17  18:02    


 


RBC  3.59 M/mm3 (3.60-5.2)  L  03/21/17  06:50    


 


Hgb  10.4 GM/dL (10.7-15.3)  L  03/21/17  06:50    


 


Hct  30.5 % (32.4-45.2)  L  03/21/17  06:50    


 


MCV  85.0 fl (80-96)   03/21/17  06:50    


 


MCHC  34.3 g/dl (32.0-36.0)   03/21/17  06:50    


 


RDW  14.1 % (11.6-15.6)   03/21/17  06:50    


 


Plt Count  393 K/MM3 (134-434)   03/21/17  06:50    


 


MPV  7.2 fl (7.5-11.1)  L  03/21/17  06:50    


 


Neutrophils %  66.0 % (42.8-82.8)   03/19/17  06:15    


 


Lymphocytes %  14.8 % (8-40)  D 03/19/17  06:15    


 


Monocytes %  13.4 % (3.8-10.2)  H  03/19/17  06:15    


 


Eosinophils %  5.4 % (0-4.5)  H D 03/19/17  06:15    


 


Basophils %  0.4 % (0-2.0)   03/19/17  06:15    


 


Differential Comment  Slide scanned   03/15/17  22:50    


 


Smudge Cells  Cancelled   03/15/17  18:02    


 


Platelet Estimate  Adequate  (NORMAL)   03/15/17  22:50    


 


Platelet Comment  No clumping noted   03/15/17  22:50    


 


Platelet Comment  No clotting detected   03/15/17  22:50    


 


RBC Morphology     03/15/17  22:50    


 


ESR  15 mm/hr (0-30)   03/17/17  07:00    








 CMP








Sodium  136 mmol/L (136-145)   03/20/17  06:20    


 


Potassium  4.0 mmol/L (3.5-5.1)   03/20/17  06:20    


 


Chloride  99 mmol/L ()   03/20/17  06:20    


 


Carbon Dioxide  31 mmol/L (21-32)   03/20/17  06:20    


 


Anion Gap  6  (8-16)  L  03/20/17  06:20    


 


BUN  7 mg/dL (7-18)   03/20/17  06:20    


 


Creatinine  0.5 mg/dL (0.55-1.02)  L D 03/20/17  06:20    


 


Creat Clearance w eGFR  > 60  (>60)   03/17/17  07:00    


 


Random Glucose  98 mg/dL ()   03/20/17  06:20    


 


Lactic Acid  1.306 mmol/L (0.4-2.0)   03/15/17  18:02    


 


Calcium  7.9 mg/dL (8.5-10.1)  L  03/20/17  06:20    


 


Total Bilirubin  0.6 mg/dL (0.2-1.0)  D 03/17/17  07:00    


 


AST  12 U/L (15-37)  L  03/17/17  07:00    


 


ALT  12 U/L (12-78)   03/17/17  07:00    


 


Alkaline Phosphatase  74 U/L ()   03/17/17  07:00    


 


LD Total  158 U/L ()   03/16/17  07:50    


 


Creatine Kinase  41 IU/L ()   03/15/17  18:02    


 


Troponin I  < 0.02 ng/ml (0.00-0.05)   03/15/17  18:02    


 


C-Reactive Protein  21.5 MG/DL (0.00-0.3)  H  03/16/17  15:30    


 


Total Protein  6.6 g/dl (6.4-8.2)   03/17/17  07:00    


 


Albumin  2.7 g/dl (3.4-5.0)  L  03/17/17  07:00    


 


Tumor Marker AFP  1.5 ng/ml (0.0-8.3)   03/16/17  08:05    











 Microbiology








 03/18/17 22:37 Blood Culture - Preliminary





 Blood - Peripheral Venous    NO GROWTH OBTAINED AFTER 48 HOURS, INCUBATION TO 

CONTINUE





    FOR 3 DAYS.


 


 03/18/17 22:37 Blood Culture - Preliminary





 Blood - Peripheral Venous    NO GROWTH OBTAINED AFTER 48 HOURS, INCUBATION TO 

CONTINUE





    FOR 3 DAYS.


 


 03/15/17 18:02 Blood Culture - Final





 Blood - Peripheral Venous    NO GROWTH AFTER 5 DAYS INCUBATION


 


 03/15/17 18:02 Blood Culture - Final





 Blood - Peripheral Venous    NO GROWTH AFTER 5 DAYS INCUBATION


 


 03/16/17 15:30 TB Test (QFT) (MALI) - Final (negative)





 Blood - Peripheral Venous 


 


 03/16/17 11:25 AFB Smear Concentration - Final (no AFB seen)





 Pleural Fluid Mycobacterial Culture - Preliminary








 


CXR (3/21/17): S/P thoracentesis of R pleural effusion, persistence of R 

pleural effusion with minimal improvement compared to CXR on 3/19/17, cannot 

rule out underlying RLL parenchymal disease, ? tracking of fluid from below the 

diaphragm to pleura along the chest wall








A/P:


Pt is a 57 yo F with a PMHx of HTN, hypothyroidism, and TAWNYA who presented to 

the ED with worsening R sided chest/abdominal pain x2 days. Pt was found to 

have a R sided pleural effusion and hepatic mass on initial CT chest. Admitted 

for further management and observation.





1. Sepsis 2/2 CAP with pleural effusion


-Stable. Pt has been afebrile with no WBC count for 48 hrs. No recorded 

hypotensive episodes


-Finished 5 day course of Rocephin and Azithromycin  


-S/P thoracentesis on 3/16/17 with reaccumulation of parapneumonic pleural 

effusion


-S/P 2nd thoracentesis today. Post procedure CXR reveals persistent R pleural 

effusion


-1st pleural fluid sample was exudative, no organisms on gram stain, and Cx NGTD


-2nd pleural fluid sample was exudative. Gram stain/Cx pending


-QFT negative


-ID started Ertapenem 1gm IV daily yesterday (day 2) to cover for bowel margret 

for ? below diaphragm inflammatory process


-ID consult appreciated


-Pulmonary consult appreciated





2. Posterior abdominal wall mass


-CT abd/pelvis performed (3/16/17)


-Met with Radiology last Friday to go over scan: ? inflammatory process in the 

posterior and anterior RUQ outside the liver body ? 2/2 to infected dropped 

stone from previous cholecystectomy (September 2015). No intrahepatic mass 

present


-Tylenol 650mg Q6H PRN for pain


-FNA of the mass was performed today. FU pathology results


-Dr. Pond input appreciated





3. HTN


-Stable


-Losartan 50mg PO daily


-HCTZ 12.5mg PO daily


-Norvasc 10mg PO daily





4. Hypothyroidism


-Synthroid 125mcg PO daily





5. Leukocytosis


-Resolved (14.5 to 7.2)





6. FEN


-No IVF


-BMP WNL. Will continue to monitor and correct any electrolyte abnormalities on 

BMP


-Low Na diet





7. DVT ppx


-Heparin 5000U SQ TID


-SCDs





8. Dispo


-Discharge status is in question


-Pt needs further evaluation and management








Jaspreet Calderón, MS3

## 2017-03-22 LAB
DEPRECATED RDW RBC AUTO: 14.3 % (ref 11.6–15.6)
INR BLD: 1.25 (ref 0.82–1.09)
MCH RBC QN AUTO: 28.4 PG (ref 25.7–33.7)
MCHC RBC AUTO-ENTMCNC: 33.3 G/DL (ref 32–36)
MCV RBC: 85.2 FL (ref 80–96)
PLATELET # BLD AUTO: 437 K/MM3 (ref 134–434)
PMV BLD: 7.1 FL (ref 7.5–11.1)
PT PNL PPP: 13.8 SEC (ref 9.98–11.88)
WBC # BLD AUTO: 7.7 K/MM3 (ref 4–10)

## 2017-03-22 RX ADMIN — LEVOTHYROXINE SODIUM SCH MCG: 125 TABLET ORAL at 06:03

## 2017-03-22 RX ADMIN — LOSARTAN POTASSIUM SCH MG: 50 TABLET, FILM COATED ORAL at 10:31

## 2017-03-22 RX ADMIN — HYDROCHLOROTHIAZIDE SCH MG: 12.5 CAPSULE ORAL at 10:31

## 2017-03-22 RX ADMIN — HEPARIN SODIUM SCH UNIT: 5000 INJECTION, SOLUTION INTRAVENOUS; SUBCUTANEOUS at 15:00

## 2017-03-22 RX ADMIN — HEPARIN SODIUM SCH UNIT: 5000 INJECTION, SOLUTION INTRAVENOUS; SUBCUTANEOUS at 21:27

## 2017-03-22 RX ADMIN — ERTAPENEM SODIUM SCH MLS/HR: 1 INJECTION, POWDER, LYOPHILIZED, FOR SOLUTION INTRAMUSCULAR; INTRAVENOUS at 10:57

## 2017-03-22 RX ADMIN — HEPARIN SODIUM SCH UNIT: 5000 INJECTION, SOLUTION INTRAVENOUS; SUBCUTANEOUS at 06:03

## 2017-03-22 RX ADMIN — AMLODIPINE BESYLATE SCH MG: 10 TABLET ORAL at 10:31

## 2017-03-22 NOTE — PN
Teaching Attending Note


Name of Resident: Franklyn Krishna


ATTENDING PHYSICIAN STATEMENT





I saw and evaluated the patient.


I reviewed the resident's note and discussed the case with the resident.


I agree with the resident's findings and plan as documented.








BYRON TRACEY MD

## 2017-03-22 NOTE — MSN
Progress Note (short form)





- Note


Progress Note: 


SUBJECTIVE: Pt seen and examined at bedside. LUCY overnight. Pt experienced some 

pleuritic CP overnight but states it has improved since. Admits to slight 

diaphoresis overnight as well. Denies fevers, chills, SOB, and dysuria. Pt is 

tolerating PO diet without nausea and vomiting. Pt appears anxious and states 

she wants to know what the "plan" is. 





 Active Medications








Generic Name Dose Route Start Last Admin





  Trade Name Freq  PRN Reason Stop Dose Admin


 


Acetaminophen  650 mg 03/16/17 03:42 03/20/17 22:31





  Tylenol -  PO   650 mg





  Q6H PRN   Administration





  FEVER OR PAIN   


 


Amlodipine Besylate  10 mg 03/16/17 10:00 03/22/17 10:31





  Norvasc -  PO   10 mg





  DAILY ROBIN   Administration


 


Heparin Sodium (Porcine)  5,000 unit 03/16/17 22:00 03/22/17 06:03





  Heparin -  SQ   5,000 unit





  TID ROBIN   Administration


 


Hydrochlorothiazide  12.5 mg 03/16/17 11:00 03/22/17 10:31





  Hctz -  PO   12.5 mg





  DAILY ROBIN   Administration


 


Ertapenem 1 gm/ Sodium  50 mls @ 100 mls/hr 03/20/17 16:00 03/22/17 10:57





  Chloride  IVPB   100 mls/hr





  DAILY ROBIN   Administration


 


Levothyroxine Sodium  125 mcg 03/16/17 07:00 03/22/17 06:03





  Synthroid -  PO   125 mcg





  DAILY@0700 ROBIN   Administration


 


Losartan Potassium  50 mg 03/16/17 10:00 03/22/17 10:31





  Cozaar -  PO   50 mg





  DAILY ROBIN   Administration











OBJECTIVE:


 Vital Signs








 Period  Temp  Pulse  Resp  BP Sys/Olivares  Pulse Ox


 


 Last 24 Hr  98.1 F-98.4 F  84-94  18-20  109-111/65-75  96








GENERAL: AAOx3 in NAD


HEAD: Normocephalic, atraumatic. PERRLA, EOMI


NECK: No JVD


HEART: Regular rate and rhythm. +S1/S2. No murmurs/rubs/gallops


LUNGS: CTA over the L lobe with decreased breath sounds over the RUL/RML and 

very faint breath sounds over R lung base. No crackles/rhonchi/wheezing heard


ABDOMEN: Soft, nondistended, and no tenderness to palpation over anterior 

abdomen. TTP over R flank and posterior R lower thoracic area. Dullness to 

percussion. Normal bowel sounds 4/4


EXT: BL LE warm, no edema in BL LE


NEURO: Normal speech





 CBC








WBC  7.7 K/mm3 (4.0-10.0)   03/22/17  07:30    


 


Corrected WBC (auto)  Cancelled   03/15/17  18:02    


 


RBC  3.60 M/mm3 (3.60-5.2)   03/22/17  07:30    


 


Hgb  10.2 GM/dL (10.7-15.3)  L  03/22/17  07:30    


 


Hct  30.7 % (32.4-45.2)  L  03/22/17  07:30    


 


MCV  85.2 fl (80-96)   03/22/17  07:30    


 


MCHC  33.3 g/dl (32.0-36.0)   03/22/17  07:30    


 


RDW  14.3 % (11.6-15.6)   03/22/17  07:30    


 


Plt Count  437 K/MM3 (134-434)  H  03/22/17  07:30    


 


MPV  7.1 fl (7.5-11.1)  L  03/22/17  07:30    


 


Neutrophils %  66.0 % (42.8-82.8)   03/19/17  06:15    


 


Lymphocytes %  14.8 % (8-40)  D 03/19/17  06:15    


 


Monocytes %  13.4 % (3.8-10.2)  H  03/19/17  06:15    


 


Eosinophils %  5.4 % (0-4.5)  H D 03/19/17  06:15    


 


Basophils %  0.4 % (0-2.0)   03/19/17  06:15    


 


Differential Comment  Slide scanned   03/15/17  22:50    


 


Smudge Cells  Cancelled   03/15/17  18:02    


 


Platelet Estimate  Adequate  (NORMAL)   03/15/17  22:50    


 


Platelet Comment  No clumping noted   03/15/17  22:50    


 


Platelet Comment  No clotting detected   03/15/17  22:50    


 


RBC Morphology     03/15/17  22:50    


 


ESR  15 mm/hr (0-30)   03/17/17  07:00    








 CMP








Sodium  136 mmol/L (136-145)   03/20/17  06:20    


 


Potassium  4.0 mmol/L (3.5-5.1)   03/20/17  06:20    


 


Chloride  99 mmol/L ()   03/20/17  06:20    


 


Carbon Dioxide  31 mmol/L (21-32)   03/20/17  06:20    


 


Anion Gap  6  (8-16)  L  03/20/17  06:20    


 


BUN  7 mg/dL (7-18)   03/20/17  06:20    


 


Creatinine  0.5 mg/dL (0.55-1.02)  L D 03/20/17  06:20    


 


Creat Clearance w eGFR  > 60  (>60)   03/17/17  07:00    


 


Random Glucose  98 mg/dL ()   03/20/17  06:20    


 


Lactic Acid  1.306 mmol/L (0.4-2.0)   03/15/17  18:02    


 


Calcium  7.9 mg/dL (8.5-10.1)  L  03/20/17  06:20    


 


Phosphorus  3.7 mg/dL (2.5-4.9)   03/21/17  13:50    


 


Total Bilirubin  0.6 mg/dL (0.2-1.0)  D 03/17/17  07:00    


 


AST  12 U/L (15-37)  L  03/17/17  07:00    


 


ALT  12 U/L (12-78)   03/17/17  07:00    


 


Alkaline Phosphatase  74 U/L ()   03/17/17  07:00    


 


LD Total  158 U/L ()   03/16/17  07:50    


 


Creatine Kinase  41 IU/L ()   03/15/17  18:02    


 


Troponin I  < 0.02 ng/ml (0.00-0.05)   03/15/17  18:02    


 


C-Reactive Protein  21.5 MG/DL (0.00-0.3)  H  03/16/17  15:30    


 


Total Protein  6.6 g/dl (6.4-8.2)   03/17/17  07:00    


 


Albumin  2.7 g/dl (3.4-5.0)  L  03/17/17  07:00    


 


Tumor Marker AFP  1.5 ng/ml (0.0-8.3)   03/16/17  08:05    











 Microbiology








 03/21/17 12:45 AFB Smear Concentration - Preliminary





 Pleural Fluid Mycobacterial Culture - Preliminary


 


 03/18/17 22:37 Blood Culture - Preliminary





 Blood - Peripheral Venous    NO GROWTH OBTAINED AFTER 72 HOURS, INCUBATION TO 

CONTINUE





    FOR 2 DAYS.


 


 03/18/17 22:37 Blood Culture - Preliminary





 Blood - Peripheral Venous    NO GROWTH OBTAINED AFTER 72 HOURS, INCUBATION TO 

CONTINUE





    FOR 2 DAYS.


 


 03/21/17 13:50 KOH Preparation - Preliminary





 Pleural Fluid Fungal Culture - Preliminary








 


CXR (3/22/17): Slight decreased in R pleural effusion when compared to CXR on 3/

21/17, persistence of questionable fluid tracking from under the diaphragm into 

the R pleural cavity








A/P:


Pt is a 55 yo F with a PMHx of HTN, hypothyroidism, and TAWNYA who presented to 

the ED with worsening R sided chest/abdominal pain x2 days. Pt was found to 

have a R sided pleural effusion and hepatic mass on initial CT chest. Admitted 

for further management and observation.





1. Exudative pleural effusion


-S/P thoracentesis x2 (3/16, 3/21) with persistence of fluid


-? 2/2 CAP vs. empyema vs. subdiaphragmatic communication vs. malignancy


-Finished 5 day course of Rocephin and Azithromycin for CAP


-Pt remains afebrile x72 hrs, with no WBC since 3/16, and no episodes of 

hypotension


-1st pleural fluid sample was exudative, no organisms on gram stain, and Cx NGTD


-2nd pleural fluid sample was exudative. Gram stain revealed no organisms, no 

PMNs, few mononuclear cells. Cx/Cytology pending


-Continue Ertapenem 1gm IV daily (day 3) for bowel margret coverage 2/2 ? below 

diaphragm inflammatory process


-Dr. Novak (CT surgery) consulted to decide whether effusion needs surgical 

management (? pigtail catheter vs. VATS)


-CT chest with contrast today


-ID consult appreciated


-Pulmonary consult appreciated





2. Abdominal mass/inflammatory process


-CT abd/pelvis performed (3/16/17)


-Met with Radiology last Friday to go over scan: ? inflammatory process in the 

posterior and anterior RUQ outside the liver body ? 2/2 to infected dropped 

stone from previous cholecystectomy (September 2015)


-FNA of the mass was performed yesterday. Specimen sent for gram stain/Cx/

cytology


-Gram stain revealed no organisms and no PMNs


-Met with Dr. Finkelstein (pathology) and he will be reviewing the slides


-CT abdomen with oral and IV contrast today


-Dr. Pond input appreciated





3. HTN


-Stable


-Losartan 50mg PO daily


-HCTZ 12.5mg PO daily


-Norvasc 10mg PO daily





4. Hypothyroidism


-Synthroid 125mcg PO daily





5. Leukocytosis


-Resolved (14.5 to 7.2)





6. FEN


-No IVF


-BMP WNL. Will continue to monitor and correct any electrolyte abnormalities on 

BMP


-Low Na diet





7. DVT ppx


-Heparin 5000U SQ TID


-SCDs





8. Dispo


-Discharge status is in question


-Proof of admission sent to pts employer


-Pt needs further evaluation and management








Jaspreet Calderón, MS3

## 2017-03-22 NOTE — PN
Addendum entered and electronically signed by Dirk Mars RES  03/22/17 13:59: 





abdominal mass aspirated by IR yesterday will follow up gram stain cultures and 

cytology 





Original Note:





Physical Exam: 


SUBJECTIVE: Patient seen and examined at bedside. 


feels better after second thoacentesis 








OBJECTIVE:





 Vital Signs











 Period  Temp  Pulse  Resp  BP Sys/Olivares  Pulse Ox


 


 Last 24 Hr  98.1 F-98.4 F  84-94  18-20  109-111/65-75  96











GENERAL: The patient is awake, alert, and fully oriented, in no acute distress. 


HEAD: Normal with no signs of trauma. 


NECK:  supple. 


LUNGS:  diminished breath sounds on the right left side is clear 


HEART: Regular rate and rhythm, S1, S2 without murmur, rub or gallop.


ABDOMEN:right sided abdominal tenderness- still with some right flank tenderness

- no chest tenderness . ABD soft non distended 


NEUROLOGICAL: Cranial nerves II through XII grossly intact. Normal speech 


PSYCH: Normal mood, normal affect.














 Laboratory Results - last 24 hr











  03/21/17 03/21/17 03/22/17





  13:50 13:50 07:30


 


WBC    7.7


 


RBC    3.60


 


Hgb    10.2 L


 


Hct    30.7 L


 


MCV    85.2


 


MCHC    33.3


 


RDW    14.3


 


Plt Count    437 H


 


MPV    7.1 L


 


Phosphorus   3.7 


 


Pleural Fluid Source  Right pleural  


 


Pleural Color  Red  


 


Pleural Appearance  Hazy  


 


Pleural WBC  1,732  


 


Pleural RBC  21,022  


 


Pleural Neutrophils  8  


 


Pleural Lymphocytes  70  


 


Pleural Monocytes  3  


 


Pleural Eosinophils  1  


 


Pleural Basophils  1  


 


Pleural Macrophages  17  


 


Pleural Chloride  100  


 


Pleural Total Protein  5.244  


 


Pleural Albumin  2  


 


Pleural LDH  873  


 


Pleural Glucose  71.845  


 


Pleural Amylase  23.478  


 


Pleural Cholesterol  84  








Active Medications











Generic Name Dose Route Start Last Admin





  Trade Name Freq  PRN Reason Stop Dose Admin


 


Acetaminophen  650 mg 03/16/17 03:42 03/20/17 22:31





  Tylenol -  PO   650 mg





  Q6H PRN   Administration





  FEVER OR PAIN   


 


Amlodipine Besylate  10 mg 03/16/17 10:00 03/22/17 10:31





  Norvasc -  PO   10 mg





  DAILY ROBIN   Administration


 


Heparin Sodium (Porcine)  5,000 unit 03/16/17 22:00 03/22/17 06:03





  Heparin -  SQ   5,000 unit





  TID ROBIN   Administration


 


Hydrochlorothiazide  12.5 mg 03/16/17 11:00 03/22/17 10:31





  Hctz -  PO   12.5 mg





  DAILY ROBIN   Administration


 


Ertapenem 1 gm/ Sodium  50 mls @ 100 mls/hr 03/20/17 16:00 03/22/17 10:57





  Chloride  IVPB   100 mls/hr





  DAILY ROBIN   Administration


 


Levothyroxine Sodium  125 mcg 03/16/17 07:00 03/22/17 06:03





  Synthroid -  PO   125 mcg





  DAILY@0700 ROBIN   Administration


 


Losartan Potassium  50 mg 03/16/17 10:00 03/22/17 10:31





  Cozaar -  PO   50 mg





  DAILY ROBIN   Administration











ASSESSMENT/PLAN:


55 y/o F w/PMH of HTN, hypothyroidism presented to ER w/ R sided chest/

abdominal pain x 2 days. Found to have R pleural effusion.





Right pleural effusion/Community acquired pneumonia/Sepsis (leukocytosis and 

fever): community acquired pneumonia with parapneumonic effusion with pleuritic 

chest pain. 


s/p thoracentesis 


patient had reaccumulation of fluid pulmonary requested repeat thoracentesis 

and second one was done 3/21/17


ID consult appreciated 


Azithromycin and Ceftriaxone stopped on day 5 


continue ertapenem day 3 given possible stone dropped in abdomen during 

cholecystectomy 


pleural fluid studies-shows inital thoracentesis was exudative likely 

parapneuomonic effusion


Afebrile 


f/u pleural fluid studies is not revealing any infectious source 


pulmonary consult appreciated   


CT surgery consult obtained and case discussed with Dr. Boyd. Will get CT 

chest ABD/Pel with IV contrast will prepeare for possible VATS this friday 


Will recheck INR if elevated will give PO vitamin K as this is a 

contraindication to epidural 





abdominal wall mass: this was seen on chest CT also read as a mass on abdominal 

CT after reviewing the scans with radiology they do not feel there is a mass in 

the liver. it is likely part of the abdominal wall vs a dropped stone from her 

cholecystectomy 


LFTs WNL 


will see if right flank/chest/abdominal pain improves with treatment of 

pneumonia-no significant improvement 


will repeat CT scan 





HTN


continue Home meds Norvasc Losartan and HCTZ





Hypothyroidism


Synthroid 125mcg qam 





-FEN


no IVF 


no electrolyte issues  


Low sodium diet 





PPx:


HSQ/SCDs


No criteria met for GI PPx


no PT evaluation needed at this time 





HLOC  





Visit type





- Emergency Visit


Emergency Visit: Yes


ED Registration Date: 03/15/17


Care time: The patient presented to the Emergency Department on the above date 

and was hospitalized for further evaluation of their emergent condition.





- New Patient


This patient is new to me today: No





- Critical Care


Critical Care patient: No

## 2017-03-22 NOTE — PN
Physical Exam: 


SUBJECTIVE: Patient seen and examined at bedside. POD#1 s/p second 

thoracentesis. 1L of exudative fluid removed. Breathing has improved. Pain well 

controlled. No fevers or chills.  Denies CP, HA, palpitations, N/V. 








OBJECTIVE:





 Vital Signs











 Period  Temp  Pulse  Resp  BP Sys/Olivares  Pulse Ox


 


 Last 24 Hr  98.1 F-98.4 F  84-95  18-20  110-122/63-69  96











GENERAL: AAO x3 , NAD


HEAD: NC/AT 


EYES: PERRL, EOMI, sclera anicteric, conjunctiva clear. No ptosis. 


ENT: Moist mucous membranes. 


NECK:  supple and no jvd 


LUNGS: Left lung CTA, right sided rales. 


HEART: RRR, S1, S2 without murmur, rub or gallop.


ABDOMEN: Soft,obese,  nontender, nondistended, normoactive bowel sounds


EXTREMITIES: 2+ pulses, warm, well-perfused, no edema. 


NEUROLOGICAL: Normal speech and fully oriented. 


PSYCH: Normal mood, normal affect.


SKIN: Warm, dry, normal turgor, no rashes or lesions noted














 Laboratory Results - last 24 hr











  03/21/17 03/21/17 03/22/17





  13:50 13:50 07:30


 


WBC    7.7


 


RBC    3.60


 


Hgb    10.2 L


 


Hct    30.7 L


 


MCV    85.2


 


MCHC    33.3


 


RDW    14.3


 


Plt Count    437 H


 


MPV    7.1 L


 


Phosphorus   3.7 


 


Pleural Fluid Source  Right pleural  


 


Pleural Color  Red  


 


Pleural Appearance  Hazy  


 


Pleural WBC  1,732  


 


Pleural RBC  21,022  


 


Pleural Neutrophils  8  


 


Pleural Lymphocytes  70  


 


Pleural Monocytes  3  


 


Pleural Eosinophils  1  


 


Pleural Basophils  1  


 


Pleural Macrophages  17  


 


Pleural Chloride  100  


 


Pleural Total Protein  5.244  


 


Pleural Albumin  2  


 


Pleural LDH  873  


 


Pleural Glucose  71.845  


 


Pleural Amylase  23.478  


 


Pleural Cholesterol  84  








Active Medications











Generic Name Dose Route Start Last Admin





  Trade Name Freq  PRN Reason Stop Dose Admin


 


Acetaminophen  650 mg 03/16/17 03:42 03/20/17 22:31





  Tylenol -  PO   650 mg





  Q6H PRN   Administration





  FEVER OR PAIN   


 


Amlodipine Besylate  10 mg 03/16/17 10:00 03/22/17 10:31





  Norvasc -  PO   10 mg





  DAILY ROBIN   Administration


 


Heparin Sodium (Porcine)  5,000 unit 03/16/17 22:00 03/22/17 06:03





  Heparin -  SQ   5,000 unit





  TID ROBIN   Administration


 


Hydrochlorothiazide  12.5 mg 03/16/17 11:00 03/22/17 10:31





  Hctz -  PO   12.5 mg





  DAILY ROBIN   Administration


 


Ertapenem 1 gm/ Sodium  50 mls @ 100 mls/hr 03/20/17 16:00 03/21/17 10:36





  Chloride  IVPB   100 mls/hr





  DAILY ROBIN   Administration


 


Levothyroxine Sodium  125 mcg 03/16/17 07:00 03/22/17 06:03





  Synthroid -  PO   125 mcg





  DAILY@0700 ROBIN   Administration


 


Losartan Potassium  50 mg 03/16/17 10:00 03/22/17 10:31





  Cozaar -  PO   50 mg





  DAILY ROBIN   Administration











ASSESSMENT/PLAN:


57 y/o F w/PMH of HTN, hypothyroidism presented to ER w/ R sided chest/

abdominal pain x 2 days. Found to have R pleural effusion  and CAP . 





Plan:





Pleural Effusion:


* Awaiting results of fine needle aspiration of Perihepatic density. 


* Gram stain pending- if bacteria present will need further drainage. 


* CT surgery consult Dr. Novak assess for need of VATS 





Visit type





- Emergency Visit


Emergency Visit: Yes


ED Registration Date: 03/15/17


Care time: The patient presented to the Emergency Department on the above date 

and was hospitalized for further evaluation of their emergent condition.





- New Patient


This patient is new to me today: Yes


Date on this admission: 03/22/17





- Critical Care


Critical Care patient: No

## 2017-03-22 NOTE — PN
Teaching Attending Note


Name of Resident: Dirk Mars


ATTENDING PHYSICIAN STATEMENT





I saw and evaluated the patient.


I reviewed the resident's note and discussed the case with the resident.


I agree with the resident's findings and plan as documented.








SUBJECTIVE:


no fever  or chills , no abd  pain 





OBJECTIVE:


NAD 


CV: RRR


Lungs : CTAB , decreased breath sounds at R base , with minimal fine crackles 


Ext : no edema on LE 





ASSESSMENT AND PLAN:





Patient is a 57 y/o F w/PMH of HTN, hypothyroidism presented to ER w/ R sided 

chest/abdominal pain x 2 days. Found to have R pleural effusion , Treated for 

CAP 





1- Exudative R pleural effusion :  possible parapneumonic  . Effusion is 

loculated and no empyema was detected. 


effusion re-accumulated fast after initial drainage despite treating possible 

pNA , which raises concern





- Consult thoracic surgery, to evaluate the loculated effusion 


- follow repeat pathology on yesterday's specimen 


- follow path on the aspirate from mass/fluid collection adjacent to the liver 

. possible inflammatory /infectious process 


- CT of chest with IV contrast 





3- Possible ascending colitis on CT scan:  


- check CT of abd with/woithout contrast , with po contrast 


- cont ertapenem 





3- HTN : cont home meds





dispo : HLOC

## 2017-03-22 NOTE — PATH
Cytology Non-Gynecological Report



Patient Name:  ISMAEL LAY

Accession #:  

[facility]. Rec. #:  [id number]                                                        

   /Age/Gender:  1960 (Age: 56) / F

Account:  [id number]                                                          

             Location: [address] [place] [address]

Taken:  3/21/2017

Received:  3/21/2017

Reported:  3/22/2017

Physicians:  DANIAL Daily M.D.

  



Specimen(s) Received

A: PLEURAL FLUID IN 50% ALCOHOL 

B: PLEURAL FLUID FRESH 





Clinical History

Right pleural effusion







Final Diagnosis

A,B. PLEURAL FLUID, RIGHT, THORACENTESIS:  

SATISFACTORY FOR EVALUATION.

NEGATIVE FOR MALIGNANT CELLS.

REACTION IS A SINGLE CELLS, HISTIOCYTES AND MIXED INFLAMMATORY CELLS.





***Electronically Signed***

Alexander Finkelstein, M.D.





Gross Description

A. Received is a 50 cc of bloody fluid in 50% alcohol. One cytofunnel slide and

one cell block are made.

B. Received is 2000 cc of bloody fluid fresh. One cytofunnel slide and one cell

block are made.

## 2017-03-23 LAB
ANION GAP SERPL CALC-SCNC: 9 MMOL/L (ref 8–16)
BASOPHILS # BLD: 1 % (ref 0–2)
CALCIUM SERPL-MCNC: 8.1 MG/DL (ref 8.5–10.1)
CO2 SERPL-SCNC: 28 MMOL/L (ref 21–32)
CREAT SERPL-MCNC: 0.5 MG/DL (ref 0.55–1.02)
DEPRECATED RDW RBC AUTO: 14.4 % (ref 11.6–15.6)
GLUCOSE SERPL-MCNC: 89 MG/DL (ref 74–106)
INR BLD: 1.27 (ref 0.82–1.09)
MCH RBC QN AUTO: 28.9 PG (ref 25.7–33.7)
MCHC RBC AUTO-ENTMCNC: 34.1 G/DL (ref 32–36)
MCV RBC: 84.6 FL (ref 80–96)
NEUTROPHILS # BLD: 87 % (ref 42.8–82.8)
PLATELET # BLD AUTO: 473 K/MM3 (ref 134–434)
PLATELET # BLD EST: (no result) 10*3/UL
PMV BLD: 7.1 FL (ref 7.5–11.1)
PT PNL PPP: 14 SEC (ref 9.98–11.88)
WBC # BLD AUTO: 8.5 K/MM3 (ref 4–10)

## 2017-03-23 RX ADMIN — LEVOTHYROXINE SODIUM SCH MCG: 125 TABLET ORAL at 06:15

## 2017-03-23 RX ADMIN — AMLODIPINE BESYLATE SCH MG: 10 TABLET ORAL at 09:18

## 2017-03-23 RX ADMIN — LOSARTAN POTASSIUM SCH MG: 50 TABLET, FILM COATED ORAL at 09:18

## 2017-03-23 RX ADMIN — DEXTROSE AND SODIUM CHLORIDE SCH MLS/HR: 5; 450 INJECTION, SOLUTION INTRAVENOUS at 22:02

## 2017-03-23 RX ADMIN — ERTAPENEM SODIUM SCH MLS/HR: 1 INJECTION, POWDER, LYOPHILIZED, FOR SOLUTION INTRAMUSCULAR; INTRAVENOUS at 09:17

## 2017-03-23 RX ADMIN — HEPARIN SODIUM SCH UNIT: 5000 INJECTION, SOLUTION INTRAVENOUS; SUBCUTANEOUS at 22:03

## 2017-03-23 RX ADMIN — HEPARIN SODIUM SCH UNIT: 5000 INJECTION, SOLUTION INTRAVENOUS; SUBCUTANEOUS at 06:15

## 2017-03-23 RX ADMIN — HYDROCHLOROTHIAZIDE SCH MG: 12.5 CAPSULE ORAL at 09:18

## 2017-03-23 RX ADMIN — HEPARIN SODIUM SCH UNIT: 5000 INJECTION, SOLUTION INTRAVENOUS; SUBCUTANEOUS at 14:04

## 2017-03-23 NOTE — PN
Physical Exam: 


SUBJECTIVE: Patient seen and examined at bedside this morning


had 1 episode of diarrhea which she describes as watery 








OBJECTIVE:





 Vital Signs











 Period  Temp  Pulse  Resp  BP Sys/Olivares  Pulse Ox


 


 Last 24 Hr  97.9 F-98.9 F    18-20  101-123/66-80  96-96














GENERAL: The patient is awake, alert, and fully oriented, in no acute distress. 


HEAD: Normal with no signs of trauma. 


NECK:  supple. 


LUNGS:  diminished breath sounds on the right left side is clear 


HEART: Regular rate and rhythm, S1, S2 without murmur, rub or gallop.


ABDOMEN:right sided abdominal tenderness- still with some right flank tenderness

- no chest tenderness . ABD soft non distended 


NEUROLOGICAL: Cranial nerves II through XII grossly intact. Normal speech 


PSYCH: Normal mood, normal affect.














 Laboratory Results - last 24 hr











  03/22/17 03/23/17 03/23/17





  14:10 06:40 06:40


 


WBC   8.5 


 


RBC   3.65 


 


Hgb   10.5 L 


 


Hct   30.9 L 


 


MCV   84.6 


 


MCHC   34.1 


 


RDW   14.4 


 


Plt Count   473 H 


 


MPV   7.1 L 


 


Neutrophils %   87.0 H D 


 


Lymphocytes %   7.0 L D 


 


Monocytes %   5.0 


 


Basophils %   1.0 


 


Differential Comment   Manual diff done 


 


Platelet Estimate   Slt increased 


 


INR  1.25 H   1.27 H


 


Sodium   


 


Potassium   


 


Chloride   


 


Carbon Dioxide   


 


Anion Gap   


 


BUN   


 


Creatinine   


 


Random Glucose   


 


Calcium   


 


Blood Type   


 


Antibody Screen   


 


Crossmatch IS Only   














  03/23/17 03/23/17





  06:40 06:40


 


WBC  


 


RBC  


 


Hgb  


 


Hct  


 


MCV  


 


MCHC  


 


RDW  


 


Plt Count  


 


MPV  


 


Neutrophils %  


 


Lymphocytes %  


 


Monocytes %  


 


Basophils %  


 


Differential Comment  


 


Platelet Estimate  


 


INR  


 


Sodium  137 


 


Potassium  4.2 


 


Chloride  100 


 


Carbon Dioxide  28 


 


Anion Gap  9 


 


BUN  7 


 


Creatinine  0.5 L 


 


Random Glucose  89 


 


Calcium  8.1 L 


 


Blood Type   B POSITIVE


 


Antibody Screen   Negative


 


Crossmatch IS Only   See Detail








Active Medications











Generic Name Dose Route Start Last Admin





  Trade Name Freq  PRN Reason Stop Dose Admin


 


Acetaminophen  650 mg 03/16/17 03:42 03/20/17 22:31





  Tylenol -  PO   650 mg





  Q6H PRN   Administration





  FEVER OR PAIN   


 


Amlodipine Besylate  10 mg 03/16/17 10:00 03/23/17 09:18





  Norvasc -  PO   10 mg





  DAILY ROBIN   Administration


 


Heparin Sodium (Porcine)  5,000 unit 03/16/17 22:00 03/23/17 06:15





  Heparin -  SQ   5,000 unit





  TID ROBIN   Administration


 


Hydrochlorothiazide  12.5 mg 03/16/17 11:00 03/23/17 09:18





  Hctz -  PO   12.5 mg





  DAILY ROBIN   Administration


 


Ertapenem 1 gm/ Sodium  50 mls @ 100 mls/hr 03/20/17 16:00 03/23/17 09:17





  Chloride  IVPB   100 mls/hr





  DAILY ROBIN   Administration


 


Dextrose/Sodium Chloride  1,000 mls @ 75 mls/hr 03/23/17 20:00  





  D5-1/2ns -  IV   





  ASDIR ROBIN   


 


Levothyroxine Sodium  125 mcg 03/16/17 07:00 03/23/17 06:15





  Synthroid -  PO   125 mcg





  DAILY@0700 ROBIN   Administration


 


Losartan Potassium  50 mg 03/16/17 10:00 03/23/17 09:18





  Cozaar -  PO   50 mg





  DAILY ROBIN   Administration











ASSESSMENT/PLAN:


55 y/o F w/PMH of HTN, hypothyroidism presented to ER w/ R sided chest/

abdominal pain x 2 days. Found to have R pleural effusion.





Right pleural effusion/Community acquired pneumonia/Sepsis (leukocytosis and 

fever): community acquired pneumonia with parapneumonic effusion with pleuritic 

chest pain. at this point i do not think the patient had a penumonia as her 

chest reaccumulated with pleural effusions so quickly with antibiotics  


s/p thoracentesis 


patient had reaccumulation of fluid pulmonary requested repeat thoracentesis 

and second one was done 3/21/17


ID consult appreciated 


Azithromycin and Ceftriaxone stopped on day 5 


continue ertapenem day 4 given possible stone dropped in abdomen during 

cholecystectomy 


pleural fluid studies-shows inital thoracentesis was exudative likely 

parapneuomonic effusion-cytology negative-cytology shows inflammatory cells 


Afebrile 


f/u pleural fluid studies is not revealing any infectious source 


pulmonary consult appreciated   


CT surgery consult obtained and case discussed with Dr. Boyd. 


for VATS tomorrow with pleural biopsy 


CT chest results noted 


 





abdominal wall mass: this was seen on chest CT also read as a mass on abdominal 

CT after reviewing the scans with radiology they do not feel there is a mass in 

the liver. it is likely part of the abdominal wall vs a dropped stone from her 

cholecystectomy 


LFTs WNL 


will see if right flank/chest/abdominal pain improves with treatment of 

pneumonia-no significant improvement 


Repeat Abdominal CT noted


GI consult 


aspirated on 3/21/17 by IR- no reprt yet by spoke to pathologist on case and 

states likely inflammatory as seen by the cells 





Ascending colitis seen on CT scan: Patient now with diarrhea 


infectious vs inflammatory


GI and surgery consult 


continue ertapenem 


Stool studies and C diff 





Mass posterior to stomach:


new mass seen on CT scan that was not there on prior CT on this admission


Surgery and GI consult consult 





HTN


continue Home meds Norvasc Losartan and HCTZ-hold losartan tomorrow AM prior to 

OR 





Hypothyroidism


Synthroid 125mcg qam 





-FEN


no IVF for now start IVF tonight once NPO 


no electrolyte issues  


Low sodium diet NPO past midnight 





PPx:


HSQ/SCDs


No criteria met for GI PPx


no PT evaluation needed at this time 





HLOC  








Visit type





- Emergency Visit


Emergency Visit: Yes


ED Registration Date: 03/15/17


Care time: The patient presented to the Emergency Department on the above date 

and was hospitalized for further evaluation of their emergent condition.





- New Patient


This patient is new to me today: No





- Critical Care


Critical Care patient: No





- Discharge Referral


Referred to [facility] No

## 2017-03-23 NOTE — MSN
Progress Note (short form)





- Note


Progress Note: 


SUBJECTIVE: Pt seen and examined at bedside. LUCY overnight. Pt experienced some 

SOB and associated CP with exertion overnight but denies Sx at rest. Admits to 

new onset watery, foul smelling diarrhea x2 episodes this morning. Denies blood 

in stool or pain with defecation. Denies fevers, chills, nausea, vomiting, and 

dysuria. 





 Active Medications








Generic Name Dose Route Start Last Admin





  Trade Name Freq  PRN Reason Stop Dose Admin


 


Acetaminophen  650 mg 03/16/17 03:42 03/20/17 22:31





  Tylenol -  PO   650 mg





  Q6H PRN   Administration





  FEVER OR PAIN   


 


Amlodipine Besylate  10 mg 03/16/17 10:00 03/23/17 09:18





  Norvasc -  PO   10 mg





  DAILY ROBIN   Administration


 


Heparin Sodium (Porcine)  5,000 unit 03/16/17 22:00 03/23/17 06:15





  Heparin -  SQ   5,000 unit





  TID ROBIN   Administration


 


Hydrochlorothiazide  12.5 mg 03/16/17 11:00 03/23/17 09:18





  Hctz -  PO   12.5 mg





  DAILY ROBIN   Administration


 


Ertapenem 1 gm/ Sodium  50 mls @ 100 mls/hr 03/20/17 16:00 03/23/17 09:17





  Chloride  IVPB   100 mls/hr





  DAILY ROBIN   Administration


 


Levothyroxine Sodium  125 mcg 03/16/17 07:00 03/23/17 06:15





  Synthroid -  PO   125 mcg





  DAILY@0700 ROBIN   Administration


 


Losartan Potassium  50 mg 03/16/17 10:00 03/23/17 09:18





  Cozaar -  PO   50 mg





  DAILY ROBIN   Administration











OBJECTIVE:


 Vital Signs








 Period  Temp  Pulse  Resp  BP Sys/Olivares  Pulse Ox


 


 Last 24 Hr  97.9 F-98.9 F    18-20  101-123/66-80  96








GENERAL: AAOx3 in NAD


HEAD: Normocephalic, atraumatic. PERRLA, EOMI


NECK: No JVD


HEART: Tachycardic with regular rhythm, +S1/S2. No murmurs/rubs/gallops


LUNGS: CTA over the L lobe with decreased breath sounds over the RUL/RML and 

very faint breath sounds over R lung base. + crackles at R lung base


ABDOMEN: Soft, nondistended, and no tenderness to palpation over anterior 

abdomen. TTP over R flank and posterior R lower thoracic area. Dullness to 

percussion. Normal bowel sounds 4/4


EXT: BL LE warm, no edema in BL LE, 2+ DP 


NEURO: Normal speech





 CBC








WBC  8.5 K/mm3 (4.0-10.0)   03/23/17  06:40    


 


Corrected WBC (auto)  Cancelled   03/15/17  18:02    


 


RBC  3.65 M/mm3 (3.60-5.2)   03/23/17  06:40    


 


Hgb  10.5 GM/dL (10.7-15.3)  L  03/23/17  06:40    


 


Hct  30.9 % (32.4-45.2)  L  03/23/17  06:40    


 


MCV  84.6 fl (80-96)   03/23/17  06:40    


 


MCHC  34.1 g/dl (32.0-36.0)   03/23/17  06:40    


 


RDW  14.4 % (11.6-15.6)   03/23/17  06:40    


 


Plt Count  473 K/MM3 (134-434)  H  03/23/17  06:40    


 


MPV  7.1 fl (7.5-11.1)  L  03/23/17  06:40    


 


Neutrophils %  87.0 % (42.8-82.8)  H D 03/23/17  06:40    


 


Lymphocytes %  7.0 % (8-40)  L D 03/23/17  06:40    


 


Monocytes %  5.0 % (3.8-10.2)   03/23/17  06:40    


 


Eosinophils %  5.4 % (0-4.5)  H D 03/19/17  06:15    


 


Basophils %  1.0 % (0-2.0)   03/23/17  06:40    


 


Differential Comment  Manual diff done   03/23/17  06:40    


 


Smudge Cells  Cancelled   03/15/17  18:02    


 


Platelet Estimate  Slt increased  (NORMAL)   03/23/17  06:40    


 


Platelet Comment  No clumping noted   03/15/17  22:50    


 


Platelet Comment  No clotting detected   03/15/17  22:50    


 


RBC Morphology     03/15/17  22:50    


 


ESR  15 mm/hr (0-30)   03/17/17  07:00    








 CMP








Sodium  137 mmol/L (136-145)   03/23/17  06:40    


 


Potassium  4.2 mmol/L (3.5-5.1)   03/23/17  06:40    


 


Chloride  100 mmol/L ()   03/23/17  06:40    


 


Carbon Dioxide  28 mmol/L (21-32)   03/23/17  06:40    


 


Anion Gap  9  (8-16)   03/23/17  06:40    


 


BUN  7 mg/dL (7-18)   03/23/17  06:40    


 


Creatinine  0.5 mg/dL (0.55-1.02)  L  03/23/17  06:40    


 


Creat Clearance w eGFR  > 60  (>60)   03/17/17  07:00    


 


Random Glucose  89 mg/dL ()   03/23/17  06:40    


 


Lactic Acid  1.306 mmol/L (0.4-2.0)   03/15/17  18:02    


 


Calcium  8.1 mg/dL (8.5-10.1)  L  03/23/17  06:40    


 


Phosphorus  3.7 mg/dL (2.5-4.9)   03/21/17  13:50    


 


Total Bilirubin  0.6 mg/dL (0.2-1.0)  D 03/17/17  07:00    


 


AST  12 U/L (15-37)  L  03/17/17  07:00    


 


ALT  12 U/L (12-78)   03/17/17  07:00    


 


Alkaline Phosphatase  74 U/L ()   03/17/17  07:00    


 


LD Total  158 U/L ()   03/16/17  07:50    


 


Creatine Kinase  41 IU/L ()   03/15/17  18:02    


 


Troponin I  < 0.02 ng/ml (0.00-0.05)   03/15/17  18:02    


 


C-Reactive Protein  21.5 MG/DL (0.00-0.3)  H  03/16/17  15:30    


 


Total Protein  6.6 g/dl (6.4-8.2)   03/17/17  07:00    


 


Albumin  2.7 g/dl (3.4-5.0)  L  03/17/17  07:00    


 


Tumor Marker AFP  1.5 ng/ml (0.0-8.3)   03/16/17  08:05    











 Microbiology








 03/18/17 22:37 Blood Culture - Preliminary





 Blood - Peripheral Venous    NO GROWTH OBTAINED AFTER 96 HOURS, INCUBATION TO 

CONTINUE





    FOR 1 DAYS.


 


 03/18/17 22:37 Blood Culture - Preliminary





 Blood - Peripheral Venous    NO GROWTH OBTAINED AFTER 96 HOURS, INCUBATION TO 

CONTINUE





    FOR 1 DAYS.


 


 03/21/17 12:45 Gram Stain - Final





 Pleural Fluid 


 


 03/21/17 12:45 Gram Stain - Final





 Aspirate 


 


 03/21/17 12:45 AFB Smear Concentration - Preliminary





 Pleural Fluid Mycobacterial Culture - Preliminary











Pleural Fluid (3/21/17) Pathology: No malignant cells, + histiocytes with mixed 

inflammatory cells





CT Chest with contrast (3/22/17): Posterior chest wall mass as previously, 

large R pleural effusion with atelectasis 


 


CT Abd/Pelvis with Oral/IV contrast (3/22/17): Abnormal hypodense area 

posterior to the stomach correlating to pts gastroplasty (1990), colitis in the 

cecum and ascending colon








A/P:


Pt is a 57 yo F with a PMHx of HTN, hypothyroidism, and TAWNYA who presented to 

the ED with worsening R sided chest/abdominal pain x2 days. Pt was found to 

have a R sided pleural effusion and hepatic mass on initial CT chest. Admitted 

for further management and observation.





1. Exudative pleural effusion


-S/P thoracentesis x2 (3/16, 3/21) with persistence of fluid


-? 2/2 CAP vs. empyema vs. subdiaphragmatic communication vs. malignancy


-Finished 5 day course of Rocephin and Azithromycin for CAP


-Unlikely it is parapneumonic


-Pt remains afebrile x96hrs, with no WBC since 3/16, and no episodes of 

hypotension


-1st pleural fluid sample was exudative, no organisms on gram stain, and Cx NGTD


-2nd pleural fluid sample was exudative. Gram stain revealed no organisms, no 

PMNs, few mononuclear cells. Cytology revealed no malignant cells, + mixed 

inflammatory cells. Cx is NGTD


-Continue Ertapenem 1gm IV daily (day 4) for bowel margret coverage 2/2 ? below 

diaphragm inflammatory process


-CT chest with contrast performed


-Thoracic surgery to perform VATS with pleural sampling and chest tube 

placement tomorrow


-NPO after midnight, hold Losartan for 24hrs before surgery, last dose Heparin 

tonight


-Thoracic surgery consult appreciated


-ID consult appreciated


-Pulmonary consult appreciated





2. Abdominal mass


-CT abd/pelvis performed (3/16/17, 3/22/17)


-Met with Radiology last Friday to go over scan: ? inflammatory process in the 

posterior and anterior RUQ outside the liver body ? 2/2 to infected dropped 

stone from previous cholecystectomy (September 2015)


-FNA cytology pending. Cx is NGTD


-Gram stain revealed no organisms and no PMNs


-Continue Ertapenem for bowel margret coverage


-Dr. Pond input appreciated





3. Ascending colitis


-As seen on CT abd/pelvis (3/22/17)


-Infectious vs. inflammatory etiology


-C diff toxin pending


-Stool Cx pending


-GI and surgery consult pending





4. HTN


-Stable


-Losartan 50mg PO daily


-HCTZ 12.5mg PO daily


-Norvasc 10mg PO daily


-Hold Losartan today





5. Hypothyroidism


-Synthroid 125mcg PO daily





6. Leukocytosis


-Resolved (14.5 to 7.2)





7. FEN


-No IVF


-BMP WNL. Will continue to monitor and correct any electrolyte abnormalities on 

BMP


-Low Na diet





8. DVT ppx


-Heparin 5000U SQ TID. Last dose tonight


-SCDs





9. Dispo


-Discharge status is in question


-Proof of admission sent to pts employer


-Pt needs further evaluation and management








Jaspreet Calderón, MS3

## 2017-03-23 NOTE — CONSULT
Consult - text type





- Consultation


Consultation Note: 


Thoracic Surgery Attending:





CC: recurrent pleural effusion





Brief history: 56F with h/o HTN, cholesytectomy, ventral hernia repair who p/w 

flank pain x ~1month, and then with fevers and pleural effusion. Thoracentesis 

done and effusion recurred. CT suggests that there is inflammatory process near 

liver that may be "dropped gallstone" related to her surgery in the past. She 

is currently hd stable and afebrile with normal wbc, and recurrent effusion. 

Cytology benign from effusion.





PE: VSS, pleasant obese female


Cor: RRR


Chest: clear. slight decrease breath sounds right side


Abd scars present





CT: as above





Imp/Plan: Recurrent effusion, likely inflammatory and related to abdominal 

presence


--Needs repeat drainage--will do VATS tomorrow AM and pleural biopsies and 

culture to r/o sources/pathologies including malignancy


--Risks (bleeding, infection, recurrence of effusion), benefits (needs drainage/

tube/dx), and alternatives (no tube, continued abx, IR drain) discussed and 

patient's questions answered and she agreed to procedure.


--I have spent 45 minutes with over 50% on counseling and coordination of care 

including the history, physical, chart review, review of images and discussions 

with the patient's doctors (including Dr. Carbajal) in this consulation.

## 2017-03-23 NOTE — PN
Progress Note (short form)





- Note


Progress Note: 


Feels overall better today.  No CP or SOB.


No abdominal discomfort. 


For CTS evaluation today. 





Pleural analysis thus far not revealing. 





 Intake & Output











 03/20/17 03/21/17 03/22/17 03/23/17





 23:59 23:59 23:59 23:59


 


Intake Total  700


 


Output Total 300   


 


Balance  700


 


Weight 219 lb 12.8 oz 219 lb 1 oz 218 lb 3 oz 217 lb 4 oz








 Last Vital Signs











Temp Pulse Resp BP Pulse Ox


 


 98.3 F   100 H  18   123/80   96 


 


 03/23/17 09:08  03/23/17 09:08  03/23/17 09:08  03/23/17 09:08  03/22/17 21:00








Active Medications





Acetaminophen (Tylenol -)  650 mg PO Q6H PRN


   PRN Reason: FEVER OR PAIN


   Last Admin: 03/20/17 22:31 Dose:  650 mg


Amlodipine Besylate (Norvasc -)  10 mg PO DAILY [facility]


   Last Admin: 03/23/17 09:18 Dose:  10 mg


Heparin Sodium (Porcine) (Heparin -)  5,000 unit SQ TID [facility]


   Last Admin: 03/23/17 06:15 Dose:  5,000 unit


Hydrochlorothiazide (Hctz -)  12.5 mg PO DAILY [facility]


   Last Admin: 03/23/17 09:18 Dose:  12.5 mg


Ertapenem 1 gm/ Sodium (Chloride)  50 mls @ 100 mls/hr IVPB DAILY [facility]


   Last Admin: 03/23/17 09:17 Dose:  100 mls/hr


Levothyroxine Sodium (Synthroid -)  125 mcg PO DAILY@0700 [facility]


   Last Admin: 03/23/17 06:15 Dose:  125 mcg


Losartan Potassium (Cozaar -)  50 mg PO DAILY [facility]


   Last Admin: 03/23/17 09:18 Dose:  50 mg








Gen: NAD at rest


Heart:  RRR


Lung: clear to auscultation


Abd: soft, nontender


Ext: no edema





 Laboratory Results - last 24 hr











  03/22/17 03/23/17 03/23/17





  14:10 06:40 06:40


 


WBC   8.5 


 


RBC   3.65 


 


Hgb   10.5 L 


 


Hct   30.9 L 


 


MCV   84.6 


 


MCHC   34.1 


 


RDW   14.4 


 


Plt Count   473 H 


 


MPV   7.1 L 


 


Neutrophils %   87.0 H D 


 


Lymphocytes %   7.0 L D 


 


Monocytes %   5.0 


 


Basophils %   1.0 


 


Differential Comment   Manual diff done 


 


Platelet Estimate   Slt increased 


 


INR  1.25 H   1.27 H


 


Sodium   


 


Potassium   


 


Chloride   


 


Carbon Dioxide   


 


Anion Gap   


 


BUN   


 


Creatinine   


 


Random Glucose   


 


Calcium   


 


Blood Type   


 


Antibody Screen   














  03/23/17 03/23/17





  06:40 06:40


 


WBC  


 


RBC  


 


Hgb  


 


Hct  


 


MCV  


 


MCHC  


 


RDW  


 


Plt Count  


 


MPV  


 


Neutrophils %  


 


Lymphocytes %  


 


Monocytes %  


 


Basophils %  


 


Differential Comment  


 


Platelet Estimate  


 


INR  


 


Sodium  137 


 


Potassium  4.2 


 


Chloride  100 


 


Carbon Dioxide  28 


 


Anion Gap  9 


 


BUN  7 


 


Creatinine  0.5 L 


 


Random Glucose  89 


 


Calcium  8.1 L 


 


Blood Type   B POSITIVE


 


Antibody Screen   Negative














IMP: 


Pneumonia


R/O Parapneumonic Pleural Effusion


Possible subdiaphragmatic communication


(?) Mass 


Atelectasis


HTN


Hypothyroidism








-  Follow up final pleural fluid analysis 


-  CTS evaluation for evaluation for need for further intervention 


-  ABX per ID 


-  pain control


-  DVT prophylaxis








Dr Carbajal

## 2017-03-23 NOTE — PN
Progress Note (short form)





- Note


Progress Note: 


more comfortable today


diarrhea one time today


no diarrhea before





 Vital Signs











 Period  Temp  Pulse  Resp  BP Sys/Olivares  Pulse Ox


 


 Last 24 Hr  97.9 F-98.3 F    18-20  101-147/63-80  96-96








cor-rrr


lungs decreased bs at bases


abd soft,nt


ext no edema





 CBC, BMP





 03/23/17 06:40 





 03/23/17 06:40 





 Microbiology





03/21/17 12:45   Pleural Fluid   Gram Stain - Final


03/21/17 12:45   Pleural Fluid   Body Fluid Culture - Preliminary


                            NO AEROBIC GROWTH, 24 HRS


03/21/17 12:45   Aspirate   Gram Stain - Final


03/21/17 12:45   Aspirate   Body Fluid Culture - Preliminary


                            NO AEROBIC GROWTH, 24 HRS


03/21/17 12:45   Pleural Fluid   AFB Smear Concentration - Preliminary


03/21/17 12:45   Pleural Fluid   Mycobacterial Culture - Preliminary


03/18/17 22:37   Blood - Peripheral Venous   Blood Culture - Preliminary


                            NO GROWTH OBTAINED AFTER 96 HOURS, INCUBATION TO 

CONTINUE


                            FOR 1 DAYS.


03/18/17 22:37   Blood - Peripheral Venous   Blood Culture - Preliminary


                            NO GROWTH OBTAINED AFTER 96 HOURS, INCUBATION TO 

CONTINUE


                            FOR 1 DAYS.


03/21/17 13:50   Pleural Fluid   KOH Preparation - Preliminary


03/21/17 13:50   Pleural Fluid   Fungal Culture - Preliminary


03/15/17 18:02   Blood - Peripheral Venous   Blood Culture - Final


                            NO GROWTH AFTER 5 DAYS INCUBATION


03/15/17 18:02   Blood - Peripheral Venous   Blood Culture - Final


                            NO GROWTH AFTER 5 DAYS INCUBATION


03/16/17 15:30   Blood - Peripheral Venous   TB Test (QFT) (MALI) - Final


03/16/17 11:25   Pleural Fluid   AFB Smear Concentration - Final


03/16/17 11:25   Pleural Fluid   Mycobacterial Culture - Preliminary


03/16/17 11:25   Pleural Fluid   Gram Stain - Final


03/16/17 11:25   Pleural Fluid   Body Fluid Culture - Final


                            NO GROWTH OF AEROBIC ORGANISMS AFTER 48 HOURS 

INCUBATION


03/16/17 11:25   Pleural Fluid   Anaerobic Culture - Final


                            NO ANAEROBES WERE ISOLATED


03/17/17 04:00   Urine - Urine Clean Catch   Urine Culture - Final


                            NO GROWTH OBTAINED


03/17/17 04:00   Urine - Urine Clean Catch   Legionella Antigen - Final


03/17/17 04:00   Urine - Urine Clean Catch   Streptococcus pneumoniae Antigen (

M - Final


03/16/17 11:25   Pleural Fluid   KOH Preparation - Preliminary


03/16/17 11:25   Pleural Fluid   Fungal Culture - Preliminary





Active Medications





Acetaminophen (Tylenol -)  650 mg PO Q6H PRN


   PRN Reason: FEVER OR PAIN


   Last Admin: 03/20/17 22:31 Dose:  650 mg


Amlodipine Besylate (Norvasc -)  10 mg PO DAILY [facility]


   Last Admin: 03/23/17 09:18 Dose:  10 mg


Heparin Sodium (Porcine) (Heparin -)  5,000 unit SQ TID [facility]


   Last Admin: 03/23/17 14:04 Dose:  5,000 unit


Hydrochlorothiazide (Hctz -)  12.5 mg PO DAILY [facility]


   Last Admin: 03/23/17 09:18 Dose:  12.5 mg


Ertapenem 1 gm/ Sodium (Chloride)  50 mls @ 100 mls/hr IVPB DAILY [facility]


   Last Admin: 03/23/17 09:17 Dose:  100 mls/hr


Dextrose/Sodium Chloride (D5-1/2ns -)  1,000 mls @ 75 mls/hr IV ASDIR [facility]


Levothyroxine Sodium (Synthroid -)  125 mcg PO DAILY@0700 [facility]


   Last Admin: 03/23/17 06:15 Dose:  125 mcg


Losartan Potassium (Cozaar -)  50 mg PO DAILY [facility]


   Last Admin: 03/23/17 09:18 Dose:  50 mg





a/p


continue ertapenem - fevers have resolved


agree with general surgery evaluation 


for VATS in am


GI to comment on colonic inflammation


cdiff ordered

## 2017-03-23 NOTE — CON.GI
Consult


Consult Specialty:: GI


Referred by:: Hospitalist


Reason for Consultation:: Right flank pain





- History of Present Illness


Chief Complaint: I was having pain on my right side for a month


History of Present Illness: 


56F admitted for evaluation of right sided flank pain.  She was experiencing 

this for about a month, went to urgent care, was prescribed a muscle relaxant 

and when the pain persisted, she went to her PMD 2 weeks later who changed the 

medication. Her pain continued so she came to the ER 3/16/17. She was noted to 

have a WBC count of 14K and Initial CT scan revealed ? thickening of the cecum 

/ ascending colon. She also had a right sided pleural effusion and ? right 

flank wall mass that was biopsied.  she had a repeat CT scan yesterday 

revealing a newly developed fluid collection posterior to the stomach, right 

pleural effusion and thickening of the right colon and cecum / ? infiltrative 

process.   She underwent thoracentesis and the fluid reaccumulated.  It appears 

exudative.  She is scheduled for VATS tomorrow by CTS. 


   She was having fevers earlier on admission but none at home.  She denies 

nausea, vomiting, dysphagia,odynophagia, rectal bleeding, change in bowel habits

, diarrhea (except for 1 episode today), night sweats, unintentional weight 

loss.  There is no family history of colorectal cancer or other GI malignancy.  

She had a colonoscopy about 2 weeks ago, performed by Dr. Haider.  She says 

that it was described as normal and a 10 year follow-up was advised.  That was 

the 2nd colonoscopy in her lifetime.   





- History Source


History Provided By: Patient


Limitations to Obtaining History: No Limitations





- Past Medical History


CNS: No: Alzheimer's


Cardio/Vascular: Yes: HTN.  No: AFIB


Pulmonary: No: Asthma


Endocrine: Yes: Hypothyroidism





- Past Surgical History


Past Surgical History: Yes: Cholecystectomy (laparoscopic), Hernia Repair (

ventral hernia)


Additional Surgical History: gastroplasty in  as a weight reduction 

measure.  this was subsequently reversed





- Alcohol/Substance Use


Hx Alcohol Use: No


History of Substance Use: reports: None





- Smoking History


Smoking history: Never smoked


Have you smoked in the past 12 months: No


Aproximately how many cigarettes per day: 0





- Social History


Usual Living Arrangement: With Spouse


ADL: Independent


Occupation: 


Place of Birth: [place] ([place]


Came to [place] (year[place] age 16


History of Recent Travel: No





Home Medications





- Allergies


Allergies/Adverse Reactions: 


 Allergies











Allergy/AdvReac Type Severity Reaction Status Date / Time


 


No Known Allergies Allergy   Verified 03/15/17 17:47














- Home Medications


Home Medications: 


Ambulatory Orders





Levothyroxine [Synthroid -] 125 mcg PO DAILY 14 


Amlodipine Besylate [Norvasc -] 10 mg PO DAILY 03/15/17 


Losartan/Hydrochlorothiazide [Losartan-Hctz 50-12.5 mg Tab] 1 each PO DAILY 03/

15/17 


Methocarbamol [Robaxin -] 500 mg PO DAILY PRN 03/15/17 











Family Disease History





- Family Disease History


Family Disease History: Heart Disease: Brother (HTN), Sister, Other: Father (

 75: dementia complications), Mother (96: alive, healthy)


Other Family History: 1 sister, 4 brothers: healthy, 1 brother with HTN.  4 

children: healthy





Review of Systems





- Review of Systems


Constitutional: reports: Chills, Fever (while admitted initially).  denies: 

Unintentional Wgt. Loss


Cardiovascular: denies: Chest Pain


Respiratory: denies: SOB


Gastrointestinal: reports: Abdominal Pain (right flank pain), Constipation (

chronic.  No change. no recent increase in laxative therapy)





Physical Exam-GI


Vital Signs: 


 Vital Signs











Temperature  98.2 F   17 14:04


 


Pulse Rate  89   17 14:04


 


Respiratory Rate  18   17 09:08


 


Blood Pressure  147/63   17 14:04


 


O2 Sat by Pulse Oximetry (%)  96   17 09:00











Constitutional: Yes: Well Nourished, Calm


Eyes: No: Sclera Icterus


Cardiovascular: Yes: Regular Rate and Rhythm


Respiratory: Yes: Diminished (right lung base)


Gastrointestinal Inspection: Yes: Scars (long midline vertical surg. scar, + 

healed trochar scars).  No: Distention


...Auscultate: Yes: Normoactive Bowel Sounds


...Palpate: No: Hepatomegaly, Splenomegaly, Tenderness


...Percussion: No: Tympanitic


...Rectal Exam: Yes: Guaiac Negative (light brown formed stool)


Edema: No


Neurological: Yes: Alert, Oriented


Labs: 


 CBC, BMP





 17 06:40 





 17 06:40 





 INR, PTT











INR  1.27  (0.82-1.09)  H  17  06:40    


 


Fibrinogen  512.0 mg/dL (238-498)  H  17  08:05    








 Hepatic Panel











Total Bilirubin  0.6 mg/dL (0.2-1.0)  D 17  07:00    


 


AST  12 U/L (15-37)  L  17  07:00    


 


ALT  12 U/L (12-78)   17  07:00    


 


Alkaline Phosphatase  74 U/L ()   17  07:00    


 


Albumin  2.7 g/dl (3.4-5.0)  L  17  07:00    








 CBC, BMP





 17 06:40 





 17 06:40 











Imaging





- Results


Cat Scan: Report Reviewed (of note, appendix described as grossly normal), 

Image Reviewed





Assessment/Plan


Abnormal abdominal CT scan:


? if primary colon pathology or secondary to a right sided inflammatory process 

leading to thickened appearing right colon.  benign abdominal exam, guaiac 

negative without colitis symptoms.  cannot explain new perigastric fluid 

collection despite her being on anitbiotics since admission


Advise:


Surgical consultation for evaluation of right flank mass / perigastric fluid 

collection


Patient having VATs tomorrow with CTS


When optimized, can proceed with colonoscopy to evaluate colon findings if no 

surgical interventions are planned


Discussed plan with Ms. Krishna

## 2017-03-23 NOTE — PATH
Cytology Non-Gynecological Report



Patient Name:  ISMAEL LAY

Accession #:  

[facility]. Rec. #:  [id number]                                                        

   /Age/Gender:  1960 (Age: 56) / F

Account:  [id number]                                                          

             Location: [address] [place] [address]/[facility]

Taken:  3/21/2017

Received:  3/21/2017

Reported:  3/23/2017

Physicians:  Everardo Daigle M.D.

DANIAL Barnes M.D. Garine Kalaydjian, M.D.

  



Specimen(s) Received

 PERIHEPATIC LESION ASPIRATE 





Clinical History

Perihepatic mass







Final Diagnosis

PERIHEPATIC LESION, ASPIRATION:

SATISFACTORY FOR EVALUATION.

ACUTE AND CHRONIC INFLAMMATION AND SCANT FRAGMENTS OF VASCULAR TISSUE SUGGESTIVE

OF GRANULATION TISSUE (SEE COMMENT).

NO MALIGNANT CELLS IDENTIFIED IN THE EXAMINED MATERIAL.



Comment: The aspirate material shows acute and chronic inflammation admixed with

small fragment of vascular tissue most consistent with granulation tissue.

Ae1/Ae3 and S100 immunostains performed and interpreted at [facility] cell block and negative, supporting the interpretation above.

Clinical and imaging correlations are suggested. 



The case was discussed with Dr. Daigle and Dr. Mcconnell on 3/23/17.





***Electronically Signed***

Alexander Finkelstein, M.D.





Gross Description

Received is 50 cc of yellow fluid in 50% alcohol. One cytofunnel slide and one

cell block are made.

## 2017-03-23 NOTE — PN
Teaching Attending Note


Name of Resident: Dirk Mars


ATTENDING PHYSICIAN STATEMENT





I saw and evaluated the patient.


I reviewed the resident's note and discussed the case with the resident.


I agree with the resident's findings and plan as documented.








SUBJECTIVE:





no fever or chills , no SOB , feels a little better today 





OBJECTIVE:


NAD 


CV: RRR


Lungs: CTAB , decreased breath sounds at R base. 


Ext : no edema on LE 





ASSESSMENT AND PLAN:





Patient is a 57 y/o F w/PMH of HTN, hypothyroidism presented to ER w/ R sided 

chest/abdominal pain x 2 days. Found to have R pleural effusion , Treated for 

CAP 





1- Exudative R pleural effusion : still not clear of etiology. possibly the 

inflamation  under the diaphragm is contributing ( colitit


Effusion re-accumulated fast after initial drainage despite treating possible 

pNA , which raises concern


pathology was neg for malignancy x 2 





- VATS  for Bx and chest tube placement tomorrow  


- follow path on the aspirate from mass adjacent to the liver . possible 

inflammatory /infectious process 


- CT scan of chest reviewed. 





2- Ascending colitis on CT scan: CT of abd and pelvis w contrast confirmed


- cont ertapenem 


- check stool studies and c diff   





3- mass adjacent to liver : ? infectious /inflammatory /reactive . 


given there is calcification in middle after a gall bladder sx  , will consult 

surgery to evaluate.


Also sx to evaluate the new fluid collection behind stomach, which was not 

there on initial CT scan   





4-HTN : cont home meds. hold losartan before procedure tomorrow 





Dispo : HLOC


DVT px

## 2017-03-24 LAB
ANION GAP SERPL CALC-SCNC: 10 MMOL/L (ref 8–16)
CALCIUM SERPL-MCNC: 8.2 MG/DL (ref 8.5–10.1)
CO2 SERPL-SCNC: 29 MMOL/L (ref 21–32)
CREAT SERPL-MCNC: 0.4 MG/DL (ref 0.55–1.02)
DEPRECATED RDW RBC AUTO: 14.1 % (ref 11.6–15.6)
GLUCOSE SERPL-MCNC: 100 MG/DL (ref 74–106)
MCH RBC QN AUTO: 28.8 PG (ref 25.7–33.7)
MCHC RBC AUTO-ENTMCNC: 34.2 G/DL (ref 32–36)
MCV RBC: 84.2 FL (ref 80–96)
PLATELET # BLD AUTO: 514 K/MM3 (ref 134–434)
PMV BLD: 7.2 FL (ref 7.5–11.1)
WBC # BLD AUTO: 7.4 K/MM3 (ref 4–10)

## 2017-03-24 PROCEDURE — 0BNF4ZZ RELEASE RIGHT LOWER LUNG LOBE, PERCUTANEOUS ENDOSCOPIC APPROACH: ICD-10-PCS | Performed by: SURGERY

## 2017-03-24 PROCEDURE — 0BBN4ZZ EXCISION OF RIGHT PLEURA, PERCUTANEOUS ENDOSCOPIC APPROACH: ICD-10-PCS | Performed by: SURGERY

## 2017-03-24 PROCEDURE — 0W9940Z DRAINAGE OF RIGHT PLEURAL CAVITY WITH DRAINAGE DEVICE, PERCUTANEOUS ENDOSCOPIC APPROACH: ICD-10-PCS | Performed by: SURGERY

## 2017-03-24 RX ADMIN — POLYETHYLENE GLYCOL 3350 SCH GM: 17 POWDER, FOR SOLUTION ORAL at 21:40

## 2017-03-24 RX ADMIN — ONDANSETRON PRN MG: 2 INJECTION INTRAMUSCULAR; INTRAVENOUS at 15:54

## 2017-03-24 RX ADMIN — AMLODIPINE BESYLATE SCH: 10 TABLET ORAL at 09:18

## 2017-03-24 RX ADMIN — LEVOTHYROXINE SODIUM SCH MCG: 125 TABLET ORAL at 06:06

## 2017-03-24 RX ADMIN — ACETAMINOPHEN SCH MG: 10 INJECTION, SOLUTION INTRAVENOUS at 16:32

## 2017-03-24 RX ADMIN — DEXTROSE AND SODIUM CHLORIDE SCH MLS/HR: 5; 450 INJECTION, SOLUTION INTRAVENOUS at 13:28

## 2017-03-24 RX ADMIN — ACETAMINOPHEN SCH MG: 10 INJECTION, SOLUTION INTRAVENOUS at 23:30

## 2017-03-24 RX ADMIN — HEPARIN SODIUM SCH UNIT: 5000 INJECTION, SOLUTION INTRAVENOUS; SUBCUTANEOUS at 21:40

## 2017-03-24 RX ADMIN — HEPARIN SODIUM SCH: 5000 INJECTION, SOLUTION INTRAVENOUS; SUBCUTANEOUS at 05:45

## 2017-03-24 RX ADMIN — IPRATROPIUM BROMIDE SCH AMP: 0.5 SOLUTION RESPIRATORY (INHALATION) at 18:55

## 2017-03-24 RX ADMIN — ACETAMINOPHEN SCH: 10 INJECTION, SOLUTION INTRAVENOUS at 12:53

## 2017-03-24 RX ADMIN — IPRATROPIUM BROMIDE SCH AMP: 0.5 SOLUTION RESPIRATORY (INHALATION) at 14:30

## 2017-03-24 RX ADMIN — ERTAPENEM SODIUM SCH: 1 INJECTION, POWDER, LYOPHILIZED, FOR SOLUTION INTRAMUSCULAR; INTRAVENOUS at 09:17

## 2017-03-24 RX ADMIN — HYDROCHLOROTHIAZIDE SCH: 12.5 CAPSULE ORAL at 09:17

## 2017-03-24 NOTE — PN
Teaching Attending Note


Name of Resident: Frank Digsg


ATTENDING PHYSICIAN STATEMENT





I saw and evaluated the patient.


I reviewed the resident's note and discussed the case with the resident.


I agree with the resident's findings and plan as documented.








SUBJECTIVE:


Patient and seen and examined in the ICU.  S/P bronchoscopy and VATS with CT 

placement x 2.  


Appeared infectious. 





 Intake & Output











 03/21/17 03/22/17 03/23/17 03/24/17





 23:59 23:59 23:59 23:59


 


Intake Total 550 1200 1550 1200


 


Output Total    450


 


Balance 550 1200 1550 750


 


Weight 219 lb 1 oz 218 lb 3 oz 217 lb 4 oz 218 lb








 Last Vital Signs











Temp Pulse Resp BP Pulse Ox


 


 98.1 F   84   16   113/69   96 


 


 03/24/17 13:47  03/24/17 13:47  03/24/17 13:47  03/24/17 13:47  03/24/17 13:47








Active Medications





Acetaminophen (Ofirmev Injection -)  1,000 mg IVPB Q6H ROBIN


   Stop: 03/25/17 05:16


   Last Admin: 03/24/17 12:53 Dose:  Not Given


Acetaminophen (Tylenol -)  650 mg PO Q6H PRN


   PRN Reason: FEVER OR PAIN


Amlodipine Besylate (Norvasc -)  10 mg PO DAILY [facility]


Fentanyl (Sublimaze Injection -)  50 mcg IVPUSH [id number] PRN


   PRN Reason: PAIN


   Stop: 03/27/17 11:09


Heparin Sodium (Porcine) (Heparin -)  5,000 unit SQ TID [facility]


Hydrochlorothiazide (Hctz -)  12.5 mg PO DAILY [facility]


Ertapenem 1 gm/ Sodium (Chloride)  50 mls @ 100 mls/hr IVPB DAILY [facility]


Ipratropium Bromide (Atrovent 0.02% Nebulizer -)  1 amp NEB QIDR [facility]


   Last Admin: 03/24/17 14:30 Dose:  1 amp


Levothyroxine Sodium (Synthroid -)  125 mcg PO DAILY@0700 [facility]


Losartan Potassium (Cozaar -)  50 mg PO DAILY [facility]


Ondansetron HCl (Zofran Injection)  4 mg IVPUSH Q6H PRN


   PRN Reason: NAUSEA AND/OR VOMITING


   Stop: 03/24/17 17:09


Ondansetron HCl (Zofran Injection)  4 mg IVPUSH Q6H PRN


   PRN Reason: NAUSEA AND/OR VOMITING


   Last Admin: 03/24/17 15:54 Dose:  4 mg


Oxycodone HCl (Roxicodone -)  5 mg PO Q4H PRN


   PRN Reason: PAIN


Oxycodone HCl (Roxicodone -)  10 mg PO Q4H PRN


   PRN Reason: PAIN


   Last Admin: 03/24/17 15:44 Dose:  10 mg


Tramadol HCl (Ultram -)  50 mg PO Q6H ROBIN


   Last Admin: 03/24/17 13:24 Dose:  Not Given








Gen: NAD at rest


Heart:  RRR


Lung: CT x 2, few rhonchi 


Abd: soft, nontender


Ext: no edema





 


 Laboratory Results - last 24 hr











  03/24/17 03/24/17 03/24/17





  06:10 06:10 06:10


 


WBC  7.4  


 


RBC  3.78  


 


Hgb  10.9  


 


Hct  31.8 L  


 


MCV  84.2  


 


MCHC  34.2  


 


RDW  14.1  


 


Plt Count  514 H  


 


MPV  7.2 L  


 


Sodium   138 


 


Potassium   3.8 


 


Chloride   99 


 


Carbon Dioxide   29 


 


Anion Gap   10 


 


BUN   8 


 


Creatinine   0.4 L 


 


Random Glucose   100 


 


Calcium   8.2 L 


 


Ferritin    139.158


 


C-Reactive Protein   














  03/24/17





  06:10


 


WBC 


 


RBC 


 


Hgb 


 


Hct 


 


MCV 


 


MCHC 


 


RDW 


 


Plt Count 


 


MPV 


 


Sodium 


 


Potassium 


 


Chloride 


 


Carbon Dioxide 


 


Anion Gap 


 


BUN 


 


Creatinine 


 


Random Glucose 


 


Calcium 


 


Ferritin 


 


C-Reactive Protein  7.8 H D











IMP: 


Pneumonia


R/O Parapneumonic Pleural Effusion


Possible subdiaphragmatic communication


(?) Mass 


Atelectasis


HTN


Hypothyroidism








Follow up pleural fluid analysis 


ABX coverage 


O2 as needed


Incentive Spirometry 


VTE prophylaxis 


Will need repeat abdominal imaging 


Daily CXR 








Dr Carbajal 


CCTime 35"

## 2017-03-24 NOTE — PN
Physical Exam: 


SUBJECTIVE: Patient seen and examined at bedside in the ICU 


post-op s/p VATS 


denies pain


feels well 








OBJECTIVE:





 Vital Signs











 Period  Temp  Pulse  Resp  BP Sys/Olivares  Pulse Ox


 


 Last 24 Hr  97.5 F-99 F  80-95  16-20  /60-74  95-96











GENERAL: The patient is awake, alert, and fully oriented, in no acute distress. 


HEAD: Normal with no signs of trauma. 


NECK:  supple. 


LUNGS:  diminished breath sounds on the right with crackles. left side is 

clear. 2 chest tubes in places with serosangenous drainage no air leak 


HEART: Regular rate and rhythm, S1, S2 without murmur, rub or gallop.


ABDOMEN: ABD soft non tender non distended 


NEUROLOGICAL: Cranial nerves II through XII grossly intact. Normal speech 


PSYCH: Normal mood, normal affect.

















 Laboratory Results - last 24 hr











  03/24/17 03/24/17 03/24/17





  06:10 06:10 06:10


 


WBC  7.4  


 


RBC  3.78  


 


Hgb  10.9  


 


Hct  31.8 L  


 


MCV  84.2  


 


MCHC  34.2  


 


RDW  14.1  


 


Plt Count  514 H  


 


MPV  7.2 L  


 


Sodium   138 


 


Potassium   3.8 


 


Chloride   99 


 


Carbon Dioxide   29 


 


Anion Gap   10 


 


BUN   8 


 


Creatinine   0.4 L 


 


Random Glucose   100 


 


Calcium   8.2 L 


 


Ferritin    139.158


 


C-Reactive Protein   














  03/24/17





  06:10


 


WBC 


 


RBC 


 


Hgb 


 


Hct 


 


MCV 


 


MCHC 


 


RDW 


 


Plt Count 


 


MPV 


 


Sodium 


 


Potassium 


 


Chloride 


 


Carbon Dioxide 


 


Anion Gap 


 


BUN 


 


Creatinine 


 


Random Glucose 


 


Calcium 


 


Ferritin 


 


C-Reactive Protein  7.8 H D








Active Medications











Generic Name Dose Route Start Last Admin





  Trade Name Freq  PRN Reason Stop Dose Admin


 


Acetaminophen  1,000 mg 03/24/17 11:15 03/24/17 12:53





  Ofirmev Injection -  IVPB 03/25/17 05:16  Not Given





  Q6H ROBIN   


 


Acetaminophen  650 mg 03/24/17 13:37  





  Tylenol -  PO   





  Q6H PRN   





  FEVER OR PAIN   


 


Amlodipine Besylate  10 mg 03/25/17 10:00  





  Norvasc -  PO   





  DAILY ROBIN   


 


Fentanyl  50 mcg 03/24/17 11:08  





  Sublimaze Injection -  IVPUSH 03/27/17 11:09  





  [id number] PRN   





  PAIN   


 


Heparin Sodium (Porcine)  5,000 unit 03/24/17 22:00  





  Heparin -  SQ   





  TID ROBIN   


 


Hydrochlorothiazide  12.5 mg 03/25/17 10:00  





  Hctz -  PO   





  DAILY [facility]   


 


Ertapenem 1 gm/ Sodium  50 mls @ 100 mls/hr 03/25/17 10:00  





  Chloride  IVPB   





  DAILY [facility]   


 


Ipratropium Bromide  1 amp 03/24/17 13:37 03/24/17 14:30





  Atrovent 0.02% Nebulizer -  NEB   1 amp





  QIDR [facility]   Administration


 


Levothyroxine Sodium  125 mcg 03/25/17 07:00  





  Synthroid -  PO   





  DAILY@0700 [facility]   


 


Losartan Potassium  50 mg 03/25/17 10:00  





  Cozaar -  PO   





  DAILY [facility]   


 


Ondansetron HCl  4 mg 03/24/17 11:08  





  Zofran Injection  IVPUSH 03/24/17 17:09  





  Q6H PRN   





  NAUSEA AND/OR VOMITING   


 


Oxycodone HCl  5 mg 03/24/17 11:11  





  Roxicodone -  PO   





  Q4H PRN   





  PAIN   


 


Oxycodone HCl  10 mg 03/24/17 11:11  





  Roxicodone -  PO   





  Q4H PRN   





  PAIN   


 


Tramadol HCl  50 mg 03/24/17 12:00 03/24/17 13:24





  Ultram -  PO   Not Given





  Q6H [facility]   











ASSESSMENT/PLAN:


55 y/o F w/PMH of HTN, hypothyroidism presented to ER w/ R sided chest/

abdominal pain x 2 days. Found to have R pleural effusion. she is now POD#0 s/p 

 bronchoscopy, Right VATS, pneumolysis, pleural biopsy, drainage of pleural 

effusion, insertion of chest tubex2





Right pleural effusion/Community acquired pneumonia/Sepsis (leukocytosis and 

fever): community acquired pneumonia with parapneumonic effusion with pleuritic 

chest pain. at this point i do not think the patient had a pneumonia as her 

chest reaccumulated with pleural effusions so quickly with antibiotics  


s/p thoracentesis x2


patient had reaccumulation of fluid pulmonary requested repeat thoracentesis 

and second one was done 3/21/17


ID consult appreciated 


Azithromycin and Ceftriaxone stopped on day 5 


continue ertapenem day 5 given possible stone dropped in abdomen during 

cholecystectomy 


pleural fluid studies-shows inital thoracentesis was exudative likely 

parapneuomonic effusion-cytology negative-cytology shows inflammatory cells 


Afebrile 


f/u pleural fluid studies is not revealing any infectious source 


pulmonary consult appreciated   


CT chest results noted 


Continue chest tube to suction per CT surgery


pain control is very important for this patient so she could take deep breaths 

and does not splint. as well as incentive spirometry. this was explained to the 

patient in detail.  





abdominal wall mass: this was seen on chest CT also read as a mass on abdominal 

CT after reviewing the scans with radiology they do not feel there is a mass in 

the liver. it is likely part of the abdominal wall vs a dropped stone from her 

cholecystectomy 


LFTs WNL 


will see if right flank/chest/abdominal pain improves with treatment of 

pneumonia-no significant improvement 


Repeat Abdominal CT noted


GI consult 


aspirated on 3/21/17 by IR- pathology report inflammatory cells 





Ascending colitis seen on CT scan: Patient now with diarrhea 


infectious vs inflammatory


GI and surgery consult 


continue ertapenem 


Stool studies and C diff-pending 


f/u iron studies-pending 





Mass posterior to stomach:


new mass seen on CT scan that was not there on prior CT on this admission


Surgery consult pending


GI consult appreciated possible colonoscopy on this admission next week 





HTN


continue Home meds Norvasc Losartan and HCTZ 





Hypothyroidism


Synthroid 125mcg qam 





-FEN


no IVF 


no electrolyte issues  


CLD advance as tolerated 





PPx:


HSQ/SCDs


No criteria met for GI PPx


PT consult to avoid deconditioning as she had a major surgery 





ICU care 











Visit type





- Emergency Visit


Emergency Visit: Yes


ED Registration Date: 03/15/17


Care time: The patient presented to the Emergency Department on the above date 

and was hospitalized for further evaluation of their emergent condition.





- New Patient


This patient is new to me today: No





- Critical Care


Critical Care patient: Yes


Total Critical Care Time (in minutes): 45


Critical Care Statement: The care of this patient involved high complexity 

decision making to prevent further life threatening deterioration of the patient

's condition and/or to evalute & treat vital organ system(s) failure or risk of 

failure.

## 2017-03-24 NOTE — MSN
Progress Note (short form)





- Note


Progress Note: 


SUBJECTIVE: Pt seen and examined at bedside. LUCY overnight. Pt experienced some 

SOB and associated CP with exertion overnight but denies Sx at rest. Admits to 

new onset watery, foul smelling diarrhea x2 episodes this morning. Denies blood 

in stool or pain with defecation. Denies fevers, chills, nausea, vomiting, and 

dysuria. 





 Active Medications








Generic Name Dose Route Start Last Admin





  Trade Name Freq  PRN Reason Stop Dose Admin


 


Acetaminophen  1,000 mg 03/24/17 11:15 03/24/17 12:53





  Ofirmev Injection -  IVPB 03/25/17 05:16  Not Given





  Q6H [facility]   


 


Acetaminophen  650 mg 03/24/17 13:37  





  Tylenol -  PO   





  Q6H PRN   





  FEVER OR PAIN   


 


Amlodipine Besylate  10 mg 03/25/17 10:00  





  Norvasc -  PO   





  DAILY [facility]   


 


Fentanyl  50 mcg 03/24/17 11:08  





  Sublimaze Injection -  IVPUSH 03/27/17 11:09  





  [id number] PRN   





  PAIN   


 


Heparin Sodium (Porcine)  5,000 unit 03/24/17 22:00  





  Heparin -  SQ   





  TID [facility]   


 


Hydrochlorothiazide  12.5 mg 03/25/17 10:00  





  Hctz -  PO   





  DAILY [facility]   


 


Ertapenem 1 gm/ Sodium  50 mls @ 100 mls/hr 03/25/17 10:00  





  Chloride  IVPB   





  DAILY [facility]   


 


Ipratropium Bromide  1 amp 03/24/17 13:37 03/24/17 14:30





  Atrovent 0.02% Nebulizer -  NEB   1 amp





  QIDR [facility]   Administration


 


Levothyroxine Sodium  125 mcg 03/25/17 07:00  





  Synthroid -  PO   





  DAILY@0700 [facility]   


 


Losartan Potassium  50 mg 03/25/17 10:00  





  Cozaar -  PO   





  DAILY [facility]   


 


Ondansetron HCl  4 mg 03/24/17 11:08  





  Zofran Injection  IVPUSH 03/24/17 17:09  





  Q6H PRN   





  NAUSEA AND/OR VOMITING   


 


Ondansetron HCl  4 mg 03/24/17 15:39  





  Zofran Injection  IVPUSH   





  Q6H PRN   





  NAUSEA AND/OR VOMITING   


 


Oxycodone HCl  5 mg 03/24/17 11:11  





  Roxicodone -  PO   





  Q4H PRN   





  PAIN   


 


Oxycodone HCl  10 mg 03/24/17 11:11 03/24/17 15:44





  Roxicodone -  PO   10 mg





  Q4H PRN   Administration





  PAIN   


 


Tramadol HCl  50 mg 03/24/17 12:00 03/24/17 13:24





  Ultram -  PO   Not Given





  Q6H [facility]   











OBJECTIVE:


 Vital Signs








 Period  Temp  Pulse  Resp  BP Sys/Olivares  Pulse Ox


 


 Last 24 Hr  97.5 F-99 F  80-95  16-20  /60-74  95-96








GENERAL: AAOx3 in physical distress


HEAD: Normocephalic, atraumatic. PERRLA, EOMI


NECK: No JVD


HEART: RRR, +S1/S2. No murmurs/rubs/gallops


LUNGS: CTA over the L lobe with decreased breath sounds over the RUL/RML and 

very faint breath sounds over R lung base. + crackles at R lung base


ABDOMEN: Soft, nondistended, and no tenderness to palpation over anterior 

abdomen. TTP over R flank and posterior R lower thoracic area. Dullness to 

percussion. Normal bowel sounds 4/4


EXT: BL LE warm, no edema in BL LE, 2+ DP 


NEURO: Normal speech





 CBC








WBC  7.4 K/mm3 (4.0-10.0)   03/24/17  06:10    


 


Corrected WBC (auto)  Cancelled   03/15/17  18:02    


 


RBC  3.78 M/mm3 (3.60-5.2)   03/24/17  06:10    


 


Hgb  10.9 GM/dL (10.7-15.3)   03/24/17  06:10    


 


Hct  31.8 % (32.4-45.2)  L  03/24/17  06:10    


 


MCV  84.2 fl (80-96)   03/24/17  06:10    


 


MCHC  34.2 g/dl (32.0-36.0)   03/24/17  06:10    


 


RDW  14.1 % (11.6-15.6)   03/24/17  06:10    


 


Plt Count  514 K/MM3 (134-434)  H  03/24/17  06:10    


 


MPV  7.2 fl (7.5-11.1)  L  03/24/17  06:10    


 


Neutrophils %  87.0 % (42.8-82.8)  H D 03/23/17  06:40    


 


Lymphocytes %  7.0 % (8-40)  L D 03/23/17  06:40    


 


Monocytes %  5.0 % (3.8-10.2)   03/23/17  06:40    


 


Eosinophils %  5.4 % (0-4.5)  H D 03/19/17  06:15    


 


Basophils %  1.0 % (0-2.0)   03/23/17  06:40    


 


Differential Comment  Manual diff done   03/23/17  06:40    


 


Smudge Cells  Cancelled   03/15/17  18:02    


 


Platelet Estimate  Slt increased  (NORMAL)   03/23/17  06:40    


 


Platelet Comment  No clumping noted   03/15/17  22:50    


 


Platelet Comment  No clotting detected   03/15/17  22:50    


 


RBC Morphology     03/15/17  22:50    


 


ESR  15 mm/hr (0-30)   03/17/17  07:00    








 CMP








Sodium  138 mmol/L (136-145)   03/24/17  06:10    


 


Potassium  3.8 mmol/L (3.5-5.1)   03/24/17  06:10    


 


Chloride  99 mmol/L ()   03/24/17  06:10    


 


Carbon Dioxide  29 mmol/L (21-32)   03/24/17  06:10    


 


Anion Gap  10  (8-16)   03/24/17  06:10    


 


BUN  8 mg/dL (7-18)   03/24/17  06:10    


 


Creatinine  0.4 mg/dL (0.55-1.02)  L  03/24/17  06:10    


 


Creat Clearance w eGFR  > 60  (>60)   03/17/17  07:00    


 


Random Glucose  100 mg/dL ()   03/24/17  06:10    


 


Lactic Acid  1.306 mmol/L (0.4-2.0)   03/15/17  18:02    


 


Calcium  8.2 mg/dL (8.5-10.1)  L  03/24/17  06:10    


 


Phosphorus  3.7 mg/dL (2.5-4.9)   03/21/17  13:50    


 


Ferritin  139.158 ng/ml (6.9-282.5)   03/24/17  06:10    


 


Total Bilirubin  0.6 mg/dL (0.2-1.0)  D 03/17/17  07:00    


 


AST  12 U/L (15-37)  L  03/17/17  07:00    


 


ALT  12 U/L (12-78)   03/17/17  07:00    


 


Alkaline Phosphatase  74 U/L ()   03/17/17  07:00    


 


LD Total  158 U/L ()   03/16/17  07:50    


 


Creatine Kinase  41 IU/L ()   03/15/17  18:02    


 


Troponin I  < 0.02 ng/ml (0.00-0.05)   03/15/17  18:02    


 


C-Reactive Protein  7.8 MG/DL (0.00-0.3)  H D 03/24/17  06:10    


 


Total Protein  6.6 g/dl (6.4-8.2)   03/17/17  07:00    


 


Albumin  2.7 g/dl (3.4-5.0)  L  03/17/17  07:00    


 


Tumor Marker AFP  1.5 ng/ml (0.0-8.3)   03/16/17  08:05    











 Microbiology








 03/24/17 09:49 KOH Preparation - Preliminary





 Pleural Fluid Fungal Culture - Preliminary


 


 03/24/17 09:59 KOH Preparation - Preliminary





 Bronchial Washings - Right Lower Lobe Fungal Culture - Preliminary


 


 03/21/17 12:45 Gram Stain - Final





 Pleural Fluid Body Fluid Culture - Final





    NO GROWTH OF AEROBIC ORGANISMS AFTER 48 HOURS INCUBATION





 Anaerobic Culture - Final





    NO ANAEROBES WERE ISOLATED


 


 03/21/17 12:45 Gram Stain - Final





 Aspirate Body Fluid Culture - Final





    NO GROWTH OF AEROBIC ORGANISMS AFTER 48 HOURS INCUBATION





 Anaerobic Culture - Final





    NO ANAEROBES WERE ISOLATED


 


 03/18/17 22:37 Blood Culture - Final





 Blood - Peripheral Venous    NO GROWTH AFTER 5 DAYS INCUBATION


 


 03/18/17 22:37 Blood Culture - Final





 Blood - Peripheral Venous    NO GROWTH AFTER 5 DAYS INCUBATION











CXR (3/24/17): S/P VATS with chest tube placement x2, no PTX 








A/P:


Pt is a 55 yo F with a PMHx of HTN, hypothyroidism, and TAWNYA who presented to 

the ED with worsening R sided chest/abdominal pain x2 days. Pt was found to 

have a R sided pleural effusion and hepatic mass on initial CT chest. Admitted 

for further management and observation.





1. Exudative pleural effusion


-S/P thoracentesis x2 (3/16, 3/21), VATS with chest tube placement x2 (3/24/17)


-? 2/2 CAP vs. empyema vs. subdiaphragmatic communication vs. malignancy


-Finished 5 day course of Rocephin and Azithromycin for CAP


-Unlikely it is parapneumonic


-Pt remains afebrile, with no WBC since 3/16, and no episodes of hypotension


-1st pleural fluid sample was exudative, no organisms on gram stain, and Cx NGTD


-2nd pleural fluid sample was exudative. Gram stain revealed no organisms, no 

PMNs, few mononuclear cells. Cytology revealed no malignant cells, + mixed 

inflammatory cells. Cx is NGTD


-Specimens from VATS send to lab. Pending results


-Continue Ertapenem 1gm IV daily (day 5) for bowel margret coverage 2/2 ? below 

diaphragm inflammatory process


-Monitor chest tube drainage


-Oxycodone, Fentanyl PRN for pain


-Thoracic surgery consult appreciated


-ID consult appreciated


-Pulmonary consult appreciated





2. Ascending colitis


-As seen on CT abd/pelvis (3/22/17)


-Infectious vs. inflammatory etiology


-C diff toxin pending


-Stool Cx pending


-GI consult appreciated. ? colonoscopy next week


-Surgery consult pending





3. Abdominal mass


-CT abd/pelvis performed (3/16/17, 3/22/17)


-Met with Radiology last Friday to go over scan: ? inflammatory process in the 

posterior and anterior RUQ outside the liver body ? 2/2 to infected dropped 

stone from previous cholecystectomy (September 2015)


-FNA cytology pending. Cx is NGTD


-Gram stain revealed no organisms and no PMNs


-Continue Ertapenem for bowel margret coverage


-Dr. Pond input appreciated





4. HTN


-Stable


-Losartan 50mg PO daily


-HCTZ 12.5mg PO daily


-Norvasc 10mg PO daily





5. Hypothyroidism


-Synthroid 125mcg PO daily





6. Leukocytosis


-Resolved (14.5 to 7.2)





7. FEN


-


-BMP WNL. Will continue to monitor and correct any electrolyte abnormalities on 

BMP


-Low Na diet





8. DVT ppx


-Heparin 5000U SQ TID


-SCDs





9. Dispo


-Discharge status is in question


-Pt needs further evaluation and management








Jaspreet Calderón, MS3

## 2017-03-24 NOTE — PN
Teaching Attending Note


Name of Resident: Dirk Mars


ATTENDING PHYSICIAN STATEMENT





I saw and evaluated the patient.


I reviewed the resident's note and discussed the case with the resident.


I agree with the resident's findings and plan as documented.








SUBJECTIVE:


no fever or  chills, seen just after she came from recovery. no cp or SOB 








OBJECTIVE:


NAD 


CV: RRR


Lungs: CTAB , decreased breath sounds at R base. 


Ext : no edema on LE 





ASSESSMENT AND PLAN:





Patient is a 55 y/o F w PMH of HTN, hypothyroidism presented to ER w/ R sided 

chest/abdominal pain x 2 days. Found to have R pleural effusion , Treated for 

CAP 





1- Exudative R pleural effusion : still not clear etiology. 





- VATS  , bx and pneumolysis, and CT  done today 


- aspirate form  mass is neg for maliganancy . Inflammatory granulomatous 

tissue 


- monitor for pneumothorax 





2- Ascending colitis on CT scan: 


- cont ertapenem 


- stool studies 


- colo and EGD early next week 








3- Mass adjacent to liver : reactive with granuloma , likely  around a dropped 

stone 


appreciate Sx input on this and on  fluid collection behind stomach  





4-HTN : cont home meds. resume losartan tomorrow 





Dispo : OC

## 2017-03-24 NOTE — SURG
Surgery First Assist Note


First Assist: Svetlana Hilario PA-C


Date of Service: 03/24/17


Diagnosis: 


recurrent pleural effusion


Procedure: 


bronchoscopy, Right VATS, pneumolysis, pleural biopsy, drainage of pleural 

effusion, insertion of chest tubex2


I was present for the entirety of the operative procedure. For further detail, 

please refer to operative report.








Visit type





- Case Type


Case Type: ED Admission

## 2017-03-24 NOTE — PN
GI Progress Note


Subjective: 


GI NOte: Kelsi has no abdominal pain to correspond with her cecal and 

ascending colon thickening and perigastric collections. She denies being told 

of diverticulosis or any abnormalities during an EGD and colonoscopy done by Dr Haider 2 years ago. I reviewed the CT scans with Dr De Leon. No perforations 

are seen. The thickening may reflect stool. Kelsi is chronically 

constipated.





- Objective


Vital Signs: 


 Vital Signs











Temperature  98.1 F   03/24/17 13:47


 


Pulse Rate  84   03/24/17 13:47


 


Respiratory Rate  16   03/24/17 13:47


 


Blood Pressure  113/69   03/24/17 13:47


 


O2 Sat by Pulse Oximetry (%)  96   03/24/17 13:47








CBC,CMP











WBC  7.4 K/mm3 (4.0-10.0)   03/24/17  06:10    


 


Corrected WBC (auto)  Cancelled   03/15/17  18:02    


 


RBC  3.78 M/mm3 (3.60-5.2)   03/24/17  06:10    


 


Hgb  10.9 GM/dL (10.7-15.3)   03/24/17  06:10    


 


Hct  31.8 % (32.4-45.2)  L  03/24/17  06:10    


 


MCV  84.2 fl (80-96)   03/24/17  06:10    


 


MCHC  34.2 g/dl (32.0-36.0)   03/24/17  06:10    


 


RDW  14.1 % (11.6-15.6)   03/24/17  06:10    


 


Plt Count  514 K/MM3 (134-434)  H  03/24/17  06:10    


 


MPV  7.2 fl (7.5-11.1)  L  03/24/17  06:10    


 


Neutrophils %  87.0 % (42.8-82.8)  H D 03/23/17  06:40    


 


Lymphocytes %  7.0 % (8-40)  L D 03/23/17  06:40    


 


Monocytes %  5.0 % (3.8-10.2)   03/23/17  06:40    


 


Eosinophils %  5.4 % (0-4.5)  H D 03/19/17  06:15    


 


Basophils %  1.0 % (0-2.0)   03/23/17  06:40    


 


Differential Comment  Manual diff done   03/23/17  06:40    


 


Smudge Cells  Cancelled   03/15/17  18:02    


 


Platelet Estimate  Slt increased  (NORMAL)   03/23/17  06:40    


 


Platelet Comment  No clumping noted   03/15/17  22:50    


 


Platelet Comment  No clotting detected   03/15/17  22:50    


 


RBC Morphology     03/15/17  22:50    


 


ESR  15 mm/hr (0-30)   03/17/17  07:00    


 


Sodium  138 mmol/L (136-145)   03/24/17  06:10    


 


Potassium  3.8 mmol/L (3.5-5.1)   03/24/17  06:10    


 


Chloride  99 mmol/L ()   03/24/17  06:10    


 


Carbon Dioxide  29 mmol/L (21-32)   03/24/17  06:10    


 


Anion Gap  10  (8-16)   03/24/17  06:10    


 


BUN  8 mg/dL (7-18)   03/24/17  06:10    


 


Creatinine  0.4 mg/dL (0.55-1.02)  L  03/24/17  06:10    


 


Creat Clearance w eGFR  > 60  (>60)   03/17/17  07:00    


 


Random Glucose  100 mg/dL ()   03/24/17  06:10    


 


Lactic Acid  1.306 mmol/L (0.4-2.0)   03/15/17  18:02    


 


Calcium  8.2 mg/dL (8.5-10.1)  L  03/24/17  06:10    


 


Phosphorus  3.7 mg/dL (2.5-4.9)   03/21/17  13:50    


 


Ferritin  139.158 ng/ml (6.9-282.5)   03/24/17  06:10    


 


Total Bilirubin  0.6 mg/dL (0.2-1.0)  D 03/17/17  07:00    


 


AST  12 U/L (15-37)  L  03/17/17  07:00    


 


ALT  12 U/L (12-78)   03/17/17  07:00    


 


Alkaline Phosphatase  74 U/L ()   03/17/17  07:00    


 


LD Total  158 U/L ()   03/16/17  07:50    


 


Creatine Kinase  41 IU/L ()   03/15/17  18:02    


 


Troponin I  < 0.02 ng/ml (0.00-0.05)   03/15/17  18:02    


 


C-Reactive Protein  7.8 MG/DL (0.00-0.3)  H D 03/24/17  06:10    


 


Total Protein  6.6 g/dl (6.4-8.2)   03/17/17  07:00    


 


Albumin  2.7 g/dl (3.4-5.0)  L  03/17/17  07:00    


 


Tumor Marker AFP  1.5 ng/ml (0.0-8.3)   03/16/17  08:05    











Constitutional: No Distress


Gastrointestinal Inspection: Yes: Scars (healed incisions,)


...Auscultate: Yes: Hypoactive Bowel Sounds


...Palpate: Yes: Soft, Other (nontender)


Labs: 


 CBC, BMP





 03/24/17 06:10 





 03/24/17 06:10 





 INR, PTT











INR  1.27  (0.82-1.09)  H  03/23/17  06:40    


 


Fibrinogen  512.0 mg/dL (238-498)  H  03/16/17  08:05    














Assessment/Plan


Right colon CT abnormalities may reflect impacted feces. I will try clear 

liquids and Miralax TID. I explained to Kelsi that she ultimately will need 

a colonoscopy and possibly an EGD as well.

## 2017-03-24 NOTE — OP
Operative Note





- Note:


Operative Date: 03/24/17


Pre-Operative Diagnosis: recurrent pleural effusion


Operation: bronchoscopy, Right VATS, pneumolysis, pleural biopsy, drainage of 

pleural effusion, insertion of chest tubex2


Post-Operative Diagnosis: Same as Pre-op


Surgeon: José Novak


Assistant: Svetlana Hilario


Anesthesiologist/CRNA: Martha Moctezuma


Anesthesia: General


Estimated Blood Loss (mls): 100


Drains & Tubes with Location: chest tube x2 right chest


Fluid Volume Replaced (mls): 1,000


Operative Report Dictated: Yes

## 2017-03-24 NOTE — CONSULT
Consultation: 


REQUESTING PROVIDER: Dr. Ny





CONSULT REQUEST: We have been asked to medically evaluate this patient for Post-

Op Critical Care





HISTORY OF PRESENT ILLNESS:





57 yo F w/ h/o HTN and hypothyroidism on synthroid initially admitted to the 

hospital through ER for R chest pain x 2 days and abdominal pain x 1 month. 

Upon work-up, she's found to have R recurrent pleural effusion with unknown 

etiology s/p multiple thoracentesis. Moreover, CT abd showed ascending colitis 

and R flank mass which was biopsied and her last colonoscopy 2 years ago was 

within normal limits. She denies fever, chills, shortness of breath, pain on 

defecation, dark stool, urinary symptoms, or acute weight loss.





Active Medications











Generic Name Dose Route Start Last Admin





  Trade Name Freq  PRN Reason Stop Dose Admin


 


Acetaminophen  1,000 mg 03/24/17 11:15 03/24/17 12:53





  Ofirmev Injection -  IVPB 03/25/17 05:16  Not Given





  Q6H [facility]   


 


Acetaminophen  650 mg 03/24/17 13:37  





  Tylenol -  PO   





  Q6H PRN   





  FEVER OR PAIN   


 


Amlodipine Besylate  10 mg 03/25/17 10:00  





  Norvasc -  PO   





  DAILY [facility]   


 


Fentanyl  50 mcg 03/24/17 11:08  





  Sublimaze Injection -  IVPUSH 03/27/17 11:09  





  [id number] PRN   





  PAIN   


 


Heparin Sodium (Porcine)  5,000 unit 03/24/17 22:00  





  Heparin -  SQ   





  TID [facility]   


 


Hydrochlorothiazide  12.5 mg 03/25/17 10:00  





  Hctz -  PO   





  DAILY [facility]   


 


Ertapenem 1 gm/ Sodium  50 mls @ 100 mls/hr 03/25/17 10:00  





  Chloride  IVPB   





  DAILY [facility]   


 


Ipratropium Bromide  1 amp 03/24/17 13:37  





  Atrovent 0.02% Nebulizer -  NEB   





  Q6HPO [facility]   


 


Levothyroxine Sodium  125 mcg 03/25/17 07:00  





  Synthroid -  PO   





  DAILY@0700 [facility]   


 


Losartan Potassium  50 mg 03/25/17 10:00  





  Cozaar -  PO   





  DAILY [facility]   


 


Ondansetron HCl  4 mg 03/24/17 11:08  





  Zofran Injection  IVPUSH 03/24/17 17:09  





  Q6H PRN   





  NAUSEA AND/OR VOMITING   


 


Oxycodone HCl  5 mg 03/24/17 11:11  





  Roxicodone -  PO   





  Q4H PRN   





  PAIN   


 


Oxycodone HCl  10 mg 03/24/17 11:11  





  Roxicodone -  PO   





  Q4H PRN   





  PAIN   


 


Tramadol HCl  50 mg 03/24/17 12:00 03/24/17 13:24





  Ultram -  PO   Not Given





  Q6H [facility]   











REVIEW OF SYSTEMS:


CONSTITUTIONAL: loss of appetite


Absent:  fever, chills, diaphoresis, generalized weakness, malaise, weight 

change


HEENT: 


Absent:  rhinorrhea, nasal congestion, throat pain, throat swelling, difficulty 

swallowing, mouth swelling, ear pain, eye pain, visual changes


CARDIOVASCULAR: chest pain


Absent: , syncope, palpitations, irregular heart rate, lightheadedness, 

peripheral edema


RESPIRATORY: 


Absent: cough, shortness of breath, dyspnea with exertion, orthopnea, wheezing, 

stridor, hemoptysis


GASTROINTESTINAL: abdominal pain


Absent: abdominal distension, nausea, vomiting, diarrhea, constipation, melena, 

hematochezia


GENITOURINARY: 


Absent: dysuria, frequency, urgency, hesitancy, hematuria, flank pain, genital 

pain


MUSCULOSKELETAL: 


Absent: myalgia, arthralgia, joint swelling, back pain, neck pain


SKIN: 


Absent: rash, itching, pallor


HEMATOLOGIC/IMMUNOLOGIC: 


Absent: easy bleeding, easy bruising, lymphadenopathy, frequent infections


ENDOCRINE:


Absent: unexplained weight gain, unexplained weight loss, heat intolerance, 

cold intolerance


NEUROLOGIC: 


Absent: headache, focal weakness or paresthesias, dizziness, unsteady gait, 

seizure, mental status changes, bladder or bowel incontinence


PSYCHIATRIC: 


Absent: anxiety, depression, suicidal or homicidal ideation, hallucinations.





PHYSICAL EXAMINATION





 


 Vital Signs











Temperature  98.1 F   03/24/17 13:47


 


Pulse Rate  84   03/24/17 13:47


 


Respiratory Rate  16   03/24/17 13:47


 


Blood Pressure  113/69   03/24/17 13:47


 


O2 Sat by Pulse Oximetry (%)  96   03/24/17 13:47








GENERAL: AAOx3, not in cardiopulmonary distress


EYES:  sclera anicteric, conjunctiva clear. 


EARS, NOSE, THROAT: dry mucous membranes, no exudate


LUNGS: CTAB


HEART: RRR, normal S1 and S2 without murmur, rub or gallop.


ABDOMEN: soft, hypoactive bs, non-tender, no rebound or guarding, vertical 

surgical scar, chest tubes in place with clean dressing, 55 cc sanguineous 

drainage in 1st pulm. vac, 130 cc sanguineous drainage in 2nd pulm. vac 


EXTREMITIES: SCDs. No peripheral edema. 


SKIN: bruises on b/l forearms due to needle sticks in ED


   





 CBCD











WBC  7.4 K/mm3 (4.0-10.0)   03/24/17  06:10    


 


RBC  3.78 M/mm3 (3.60-5.2)   03/24/17  06:10    


 


Hgb  10.9 GM/dL (10.7-15.3)   03/24/17  06:10    


 


Hct  31.8 % (32.4-45.2)  L  03/24/17  06:10    


 


MCV  84.2 fl (80-96)   03/24/17  06:10    


 


MCHC  34.2 g/dl (32.0-36.0)   03/24/17  06:10    


 


RDW  14.1 % (11.6-15.6)   03/24/17  06:10    


 


Plt Count  514 K/MM3 (134-434)  H  03/24/17  06:10    


 


MPV  7.2 fl (7.5-11.1)  L  03/24/17  06:10    








 CMP











Sodium  138 mmol/L (136-145)   03/24/17  06:10    


 


Potassium  3.8 mmol/L (3.5-5.1)   03/24/17  06:10    


 


Chloride  99 mmol/L ()   03/24/17  06:10    


 


Carbon Dioxide  29 mmol/L (21-32)   03/24/17  06:10    


 


Anion Gap  10  (8-16)   03/24/17  06:10    


 


BUN  8 mg/dL (7-18)   03/24/17  06:10    


 


Creatinine  0.4 mg/dL (0.55-1.02)  L  03/24/17  06:10    


 


Creat Clearance w eGFR  > 60  (>60)   03/17/17  07:00    


 


Calcium  8.2 mg/dL (8.5-10.1)  L  03/24/17  06:10    


 


Total Bilirubin  0.6 mg/dL (0.2-1.0)  D 03/17/17  07:00    


 


AST  12 U/L (15-37)  L  03/17/17  07:00    


 


ALT  12 U/L (12-78)   03/17/17  07:00    


 


Alkaline Phosphatase  74 U/L ()   03/17/17  07:00    


 


Total Protein  6.6 g/dl (6.4-8.2)   03/17/17  07:00    


 


Albumin  2.7 g/dl (3.4-5.0)  L  03/17/17  07:00    








 Intake & Output











 03/21/17 03/22/17 03/23/17 03/24/17





 23:59 23:59 23:59 23:59


 


Intake Total 550 1200 1550 1200


 


Output Total    450


 


Balance 550 1200 1550 750


 


Weight 99.365 kg 98.968 kg 98.543 kg 98.883 kg








 INR, PTT











INR  1.27  (0.82-1.09)  H  03/23/17  06:40    


 


Fibrinogen  512.0 mg/dL (238-498)  H  03/16/17  08:05    











Microbiology





03/24/17   Pleural fluid analysis - PENDING





03/21/17   Pleural fluid gram stain, culture and aspirate - NEGATIVE


03/21/17   Pleural Fluid   AFB Smear Concentration - Preliminary


03/21/17   Pleural Fluid   Mycobacterial Culture - Preliminary





03/18/17   Blood Culture - NEGATIVE





Imaging





CXR on 3/24: right chest tube in place





Chest, Abd and Pelvis CT on 3/22: Posterior chest wall mass. Right effusion and 

compressive atelectasis. Abnormal hypodense area posterior to the postoperative 

stomach. Colitis with diffuse infiltrating lesion. 








ASSESSMENT/PLAN:





57 yo F w/ h/o HTN, cholecystectomy and hypothyroidism on synthroid initially 

admitted to the hospital through ER for R chest x 2 days and abdominal painx 1 

month. Patient was found to have refractory pleural effusion with unknown 

etiology and now s/p thoracentesis on observation in ICU.





Pulm: Refractory malignant vs. nonmalignant pleural effusion


   - s/p R VATS, pleural biopsy, 3rd thoracentesis, and chest tube insertion


      * prior pleural studies show parapneuomonic exudative, negative cytology, 

positive inflammatory cells   


   - F/u pleural fluid analysis


   - F/u serum LDH and protein 


   - Fentanyl for pain control


   - Incentive spirometer





GI: R flank mass; perigastric fluid collection 


   - Mass likely to be metastatic malignancy


      * possible mass on liver


   - Newly developed perigastric fluid collection


      * Stomach stapling in 1990 at [facility]


      * recanulated in 1991


      * surgical evaluation





ID: Infectious vs. inflammatory colitis; CAP; h/o Hep A


   - Afrebile with normal WBC


   - Colitis likely 2/2 stool impaction


      * Colonoscopy when stable


      * Trial of Miralax for decompaction


   - F/U c. diff toxin


   - Treated for CAP with azithro and ceftriaxone


      * now on ertapenem day 5 only





Cardiac: HTN


   - Cont. Cozaar, HCTZ and norvasc





Endo: Hypothyroidism


   - On synthroid 125 mcg





FEN


   - D5+1/2NS at 75ml/hr


   - Normal lytes, cont. to monitor


   - NPO, Meds OK





Prophylaxis 


   - DVT: heparin TID SQ


   - GI: Not indicated





Disposition


   - Cont. to monitor in ICU





Code status


   - Full code





Visit type





- Emergency Visit


Emergency Visit: No





- New Patient


This patient is new to me today: Yes


Date on this admission: 03/24/17





- Critical Care


Critical Care patient: Yes


Total Critical Care Time (in minutes): 45


Critical Care Statement: The care of this patient involved high complexity 

decision making to prevent further life threatening deterioration of the patient

's condition and/or to evalute & treat vital organ system(s) failure or risk of 

failure.

## 2017-03-25 LAB
ALBUMIN SERPL-MCNC: 2.2 G/DL (ref 3.4–5)
ALBUMIN SERPL-MCNC: 2.3 G/DL (ref 3.4–5)
ALP SERPL-CCNC: 100 U/L (ref 45–117)
ALP SERPL-CCNC: 101 U/L (ref 45–117)
ALT SERPL-CCNC: 79 U/L (ref 12–78)
ALT SERPL-CCNC: 83 U/L (ref 12–78)
ANION GAP SERPL CALC-SCNC: 6 MMOL/L (ref 8–16)
APTT BLD: 33.1 SECONDS (ref 26.9–34.4)
AST SERPL-CCNC: 58 U/L (ref 15–37)
AST SERPL-CCNC: 60 U/L (ref 15–37)
BASOPHILS # BLD: 0.4 % (ref 0–2)
BILIRUB CONJ SERPL-MCNC: 0.1 MG/DL (ref 0–0.2)
BILIRUB DIRECT SERPL-MCNC: 150 U/L (ref 84–246)
BILIRUB SERPL-MCNC: 0.2 MG/DL (ref 0.2–1)
BILIRUB SERPL-MCNC: 0.3 MG/DL (ref 0.2–1)
CALCIUM SERPL-MCNC: 8 MG/DL (ref 8.5–10.1)
CO2 SERPL-SCNC: 30 MMOL/L (ref 21–32)
CREAT SERPL-MCNC: 0.4 MG/DL (ref 0.55–1.02)
CRP SERPL-MCNC: 6.5 MG/DL (ref 0–0.3)
DEPRECATED RDW RBC AUTO: 14.3 % (ref 11.6–15.6)
EOSINOPHIL # BLD: 0.1 % (ref 0–4.5)
FERRITIN SERPL-MCNC: 143.38 NG/ML (ref 6.9–282.5)
GLUCOSE SERPL-MCNC: 102 MG/DL (ref 74–106)
INR BLD: 1.26 (ref 0.82–1.09)
IRON SERPL-MCNC: 36 UG/DL (ref 27–159)
MCH RBC QN AUTO: 28.5 PG (ref 25.7–33.7)
MCHC RBC AUTO-ENTMCNC: 33.7 G/DL (ref 32–36)
MCV RBC: 84.4 FL (ref 80–96)
NEUTROPHILS # BLD: 81.5 % (ref 42.8–82.8)
PLATELET # BLD AUTO: 474 K/MM3 (ref 134–434)
PMV BLD: 7.2 FL (ref 7.5–11.1)
PROT SERPL-MCNC: 6 G/DL (ref 6.4–8.2)
PROT SERPL-MCNC: 6 G/DL (ref 6.4–8.2)
PT PNL PPP: 13.9 SEC (ref 9.98–11.88)
TIBC SERPL-MCNC: 198 UG/DL (ref 250–450)
UIBC SERPL-MCNC: 162 UG/DL (ref 131–425)
WBC # BLD AUTO: 10.9 K/MM3 (ref 4–10)

## 2017-03-25 RX ADMIN — ERTAPENEM SODIUM SCH MLS/HR: 1 INJECTION, POWDER, LYOPHILIZED, FOR SOLUTION INTRAMUSCULAR; INTRAVENOUS at 10:22

## 2017-03-25 RX ADMIN — KETOROLAC TROMETHAMINE SCH: 30 INJECTION INTRAMUSCULAR; INTRAVENOUS at 18:51

## 2017-03-25 RX ADMIN — IPRATROPIUM BROMIDE SCH AMP: 0.5 SOLUTION RESPIRATORY (INHALATION) at 07:08

## 2017-03-25 RX ADMIN — LEVOTHYROXINE SODIUM SCH MCG: 125 TABLET ORAL at 07:29

## 2017-03-25 RX ADMIN — ACETAMINOPHEN SCH MG: 10 INJECTION, SOLUTION INTRAVENOUS at 05:42

## 2017-03-25 RX ADMIN — IPRATROPIUM BROMIDE SCH: 0.5 SOLUTION RESPIRATORY (INHALATION) at 18:06

## 2017-03-25 RX ADMIN — IPRATROPIUM BROMIDE SCH AMP: 0.5 SOLUTION RESPIRATORY (INHALATION) at 00:05

## 2017-03-25 RX ADMIN — AMLODIPINE BESYLATE SCH MG: 10 TABLET ORAL at 10:22

## 2017-03-25 RX ADMIN — POLYETHYLENE GLYCOL 3350 SCH GM: 17 POWDER, FOR SOLUTION ORAL at 14:40

## 2017-03-25 RX ADMIN — ACETAMINOPHEN PRN MG: 325 TABLET ORAL at 21:32

## 2017-03-25 RX ADMIN — LOSARTAN POTASSIUM SCH MG: 50 TABLET, FILM COATED ORAL at 10:22

## 2017-03-25 RX ADMIN — POLYETHYLENE GLYCOL 3350 SCH GM: 17 POWDER, FOR SOLUTION ORAL at 21:30

## 2017-03-25 RX ADMIN — POLYETHYLENE GLYCOL 3350 SCH GM: 17 POWDER, FOR SOLUTION ORAL at 05:43

## 2017-03-25 RX ADMIN — HEPARIN SODIUM SCH UNIT: 5000 INJECTION, SOLUTION INTRAVENOUS; SUBCUTANEOUS at 21:30

## 2017-03-25 RX ADMIN — HEPARIN SODIUM SCH UNIT: 5000 INJECTION, SOLUTION INTRAVENOUS; SUBCUTANEOUS at 05:43

## 2017-03-25 RX ADMIN — ONDANSETRON PRN MG: 2 INJECTION INTRAMUSCULAR; INTRAVENOUS at 01:00

## 2017-03-25 RX ADMIN — IPRATROPIUM BROMIDE SCH AMP: 0.5 SOLUTION RESPIRATORY (INHALATION) at 11:33

## 2017-03-25 RX ADMIN — KETOROLAC TROMETHAMINE SCH MG: 30 INJECTION INTRAMUSCULAR; INTRAVENOUS at 10:03

## 2017-03-25 RX ADMIN — KETOROLAC TROMETHAMINE SCH MG: 30 INJECTION INTRAMUSCULAR; INTRAVENOUS at 23:00

## 2017-03-25 RX ADMIN — HYDROCHLOROTHIAZIDE SCH MG: 12.5 CAPSULE ORAL at 10:22

## 2017-03-25 RX ADMIN — HEPARIN SODIUM SCH UNIT: 5000 INJECTION, SOLUTION INTRAVENOUS; SUBCUTANEOUS at 16:39

## 2017-03-25 RX ADMIN — ACETAMINOPHEN PRN MG: 325 TABLET ORAL at 16:35

## 2017-03-25 NOTE — PN
Progress Note (short form)





- Note


Progress Note: 


Thoracic Surgery- Dr. Novak


Patient seen and examined.  Patient states she is doing well.  She had some 

pain this morning, but it has improved after receiving pain medication.  She 

has not been OOB yet.  She still has her Alford in place.  She has been working 

to take deep breaths.  She states she is now feeling hungry.  She denies fever/

chills/nausea/vomiting/SOB.





 Last Vital Signs











Temp Pulse Resp BP Pulse Ox


 


 97.6 F   68   22   104/59   94 L


 


 03/25/17 06:00  03/25/17 08:39  03/25/17 08:33  03/25/17 08:33  03/25/17 08:39








 CBC, BMP





 03/25/17 05:10 





 03/25/17 05:10 





CXR pending





Exam:


Gen: NAD, pleasant and cooperative


Cardio: RRR


Resp: CTA, slightly decreased right base, dressings clean/dry/intact, chest 

tubes stripped, serosanguineous drainage, no air leak


CT output: 80 and 95 ml overnight














a/P


POD#1 s/p  bronchoscopy, Right VATS, pneumolysis, pleural biopsy, drainage of 

pleural effusion, insertion of chest tubex2





Patient discussed with Dr. Novak


Pain control- will add Toradol


Continue CTx2 to suction, strip/milk tubes q4-6h


F/up CXR


Remove alford


Adv diet as tolerated


Patient may be downgraded to floor

## 2017-03-25 NOTE — PN
Progress Note, Physician


Chief Complaint: 


ID








Patient post op day 1 Unsure of operative findings at this time but she appears 

to be doing well post op


Denies any abd complaints.  Getting empiric Ertepenem though all cultures have 

been no growth. " Inflammatory


mass" mentioned but unsure what this is.  Pathology sent





- Current Medication List


Current Medications: 


Active Medications





Acetaminophen (Tylenol -)  650 mg PO Q6H PRN


   PRN Reason: FEVER OR PAIN


Amlodipine Besylate (Norvasc -)  10 mg PO DAILY [facility]


Fentanyl (Sublimaze Injection -)  50 mcg IVPUSH [id number] PRN


   PRN Reason: PAIN


   Stop: 03/27/17 11:09


Heparin Sodium (Porcine) (Heparin -)  5,000 unit SQ TID [facility]


   Last Admin: 03/25/17 05:43 Dose:  5,000 unit


Hydrochlorothiazide (Hctz -)  12.5 mg PO DAILY [facility]


Ertapenem 1 gm/ Sodium (Chloride)  50 mls @ 100 mls/hr IVPB DAILY [facility]


Ipratropium Bromide (Atrovent 0.02% Nebulizer -)  1 amp NEB QIDR [facility]


   Last Admin: 03/25/17 07:08 Dose:  1 amp


Levothyroxine Sodium (Synthroid -)  125 mcg PO DAILY@0700 [facility]


   Last Admin: 03/25/17 07:29 Dose:  125 mcg


Losartan Potassium (Cozaar -)  50 mg PO DAILY [facility]


Ondansetron HCl (Zofran Injection)  4 mg IVPUSH Q6H PRN


   PRN Reason: NAUSEA AND/OR VOMITING


   Last Admin: 03/25/17 01:00 Dose:  4 mg


Oxycodone HCl (Roxicodone -)  5 mg PO Q4H PRN


   PRN Reason: PAIN


Oxycodone HCl (Roxicodone -)  10 mg PO Q4H PRN


   PRN Reason: PAIN


   Last Admin: 03/24/17 20:44 Dose:  10 mg


Polyethylene Glycol (Miralax (For Daily Use) -)  17 gm PO TID [facility]


   Last Admin: 03/25/17 05:43 Dose:  17 gm


Tramadol HCl (Ultram -)  50 mg PO Q6H [facility]


   Last Admin: 03/25/17 05:44 Dose:  50 mg











- Objective


Vital Signs: 


 Vital Signs











Temperature  97.6 F   03/25/17 06:00


 


Pulse Rate  75   03/25/17 06:00


 


Respiratory Rate  20   03/25/17 06:00


 


Blood Pressure  116/64   03/25/17 06:00


 


O2 Sat by Pulse Oximetry (%)  100   03/24/17 22:00











Constitutional: Yes: Well Nourished, No Distress


HENT: Yes: WNL, Atraumatic


Neck: Yes: WNL, Supple


Cardiovascular: Yes: Regular Rate and Rhythm, S1, S2.  No: Murmur, Rub


Respiratory: Yes: WNL, Regular, CTA Bilaterally.  No: Rales, Rhonchi


Gastrointestinal: Yes: WNL, Normal Bowel Sounds, Soft.  No: Tenderness, 

Tenderness, Rebound


Edema: No


Labs: 


 CBC, BMP





 03/25/17 05:10 





 03/25/17 05:10 





 INR, PTT











INR  1.26  (0.82-1.09)  H  03/25/17  05:10    


 


Fibrinogen  512.0 mg/dL (238-498)  H  03/16/17  08:05    














Assessment/Plan


Microbiology





03/21/17 12:45   Aspirate   Gram Stain - Final


03/21/17 12:45   Aspirate   Anaerobic Culture - Final


                              NO GROWTH OF AEROBIC ORGANISMS AFTER 48 HOURS 

INCUBATION


                              NO ANAEROBES WERE ISOLATED


03/24/17 09:49   Pleural Fluid   KOH Preparation - Preliminary


03/24/17 09:49   Pleural Fluid   Fungal Culture - Preliminary


03/21/17 13:50   Pleural Fluid   KOH Preparation - Preliminary


03/21/17 13:50   Pleural Fluid   Fungal Culture - Preliminary





 Laboratory Tests











  03/16/17 03/21/17 03/25/17





  15:15 13:50 05:10


 


WBC   


 


Hgb   


 


Plt Count   


 


INR   


 


BUN    5 L D


 


Creatinine    0.4 L


 


Creat Clearance w eGFR    > 60


 


AST   


 


ALT   


 


Alkaline Phosphatase   


 


Total Protein   


 


Albumin   


 


Pleural Appearance   Hazy 


 


Pleural WBC   1,732 


 


Pleural Lymphocytes   70 


 


Pleural Chloride   100 


 


Pleural Total Protein   5.244 


 


Pleural LDH   873 


 


HIV 1&2 Antibody Screen  Negative  


 


HIV P24 Antigen  Negative  














  03/25/17 03/25/17 03/25/17





  05:10 05:10 05:10


 


WBC    10.9 H D


 


Hgb    9.3 L D


 


Plt Count    474 H


 


INR  1.26 H  


 


BUN   


 


Creatinine   


 


Creat Clearance w eGFR   


 


AST   58 H 


 


ALT   79 H 


 


Alkaline Phosphatase   101 


 


Total Protein   6.0 L 


 


Albumin   2.2 L 


 


Pleural Appearance   


 


Pleural WBC   


 


Pleural Lymphocytes   


 


Pleural Chloride   


 


Pleural Total Protein   


 


Pleural LDH   


 


HIV 1&2 Antibody Screen   


 


HIV P24 Antigen   








Assessment  Right exudative pleural effusion sterile now S/P VAT Initiallly 

febrile  ? now are whether any communication with the abd


                         abd etiology of "inflammation"CRP has come down





Plan                  Await biopsies


                          Discussed with primary care Would consider stopping 

antibiotics soon as afebrile negative cultures and uncertain as to


                          source of initial fever


                          Quant gold and Histo EM Mcintosh MD

## 2017-03-25 NOTE — PN
Progress Note (short form)





- Note


Progress Note: 


Pulm/CCM


Patient and seen and examined in the ICU. 





24Hr: 


Doing well post Vats, awaiting results from pleural biopsy


decreasing output from CT but to remain today


OOB





 








 Vital Signs











Temp  97.6 F   03/25/17 06:00


 


Pulse  68   03/25/17 08:39


 


Resp  22   03/25/17 08:33


 


BP  104/59   03/25/17 08:33


 


Pulse Ox  94 L  03/25/17 08:39








 Intake & Output











 03/24/17 03/25/17 03/25/17





 23:59 11:59 23:59


 


Intake Total 1850 925 


 


Output Total 1384 1275 


 


Balance 466 -350 


 


Weight  101.106 kg 


 


Intake:   


 


   525 


 


    D5-1/2Ns - 1,000 ml @ 75 300 525 





    mls/hr IV ASDIR [facility] Rx#:   





    [id number]   


 


    Lactated Ringers Solution 300  





    1,000 ml @ 75 mls/hr IV   





    ASDIR [facility] Rx#:[id number]   


 


  IVPB 150 100 


 


  Oral 1100 300 


 


Output:   


 


  Chest Tube Drainage 134 175 


 


    Right Anterior Chest 54 80 


 


    Right Lateral Chest 55 95 


 


  Urine 1250 1100 


 


    [place] 1050 1100 


 


Other:   


 


  Voiding Method Indwelling Catheter Indwelling Catheter 


 


  Bowel Movement No  


 


  Weight Measurement Method  Built in [facility] 








 CBC, BMP





 03/25/17 05:10 





 03/25/17 05:10 











Active Medications





Acetaminophen (Tylenol -)  650 mg PO Q6H PRN


   PRN Reason: FEVER OR PAIN


Amlodipine Besylate (Norvasc -)  10 mg PO DAILY [facility]


   Last Admin: 03/25/17 10:22 Dose:  10 mg


Fentanyl (Sublimaze Injection -)  50 mcg IVPUSH [id number] PRN


   PRN Reason: PAIN


   Stop: 03/27/17 11:09


Heparin Sodium (Porcine) (Heparin -)  5,000 unit SQ TID [facility]


   Last Admin: 03/25/17 05:43 Dose:  5,000 unit


Hydrochlorothiazide (Hctz -)  12.5 mg PO DAILY [facility]


   Last Admin: 03/25/17 10:22 Dose:  12.5 mg


Ertapenem 1 gm/ Sodium (Chloride)  50 mls @ 100 mls/hr IVPB DAILY [facility]


   Last Admin: 03/25/17 10:22 Dose:  100 mls/hr


Ipratropium Bromide (Atrovent 0.02% Nebulizer -)  1 amp NEB QIDR [facility]


   Last Admin: 03/25/17 11:33 Dose:  1 amp


Ketorolac Tromethamine (Toradol Injection -)  15 mg IVPUSH Q6H [facility]


   Stop: 03/27/17 08:59


   Last Admin: 03/25/17 10:03 Dose:  15 mg


Levothyroxine Sodium (Synthroid -)  125 mcg PO DAILY@0700 [facility]


   Last Admin: 03/25/17 07:29 Dose:  125 mcg


Losartan Potassium (Cozaar -)  50 mg PO DAILY [facility]


   Last Admin: 03/25/17 10:22 Dose:  50 mg


Ondansetron HCl (Zofran Injection)  4 mg IVPUSH Q6H PRN


   PRN Reason: NAUSEA AND/OR VOMITING


   Last Admin: 03/25/17 01:00 Dose:  4 mg


Oxycodone HCl (Roxicodone -)  5 mg PO Q4H PRN


   PRN Reason: PAIN


Oxycodone HCl (Roxicodone -)  10 mg PO Q4H PRN


   PRN Reason: PAIN


   Last Admin: 03/24/17 20:44 Dose:  10 mg


Polyethylene Glycol (Miralax (For Daily Use) -)  17 gm PO TID [facility]


   Last Admin: 03/25/17 05:43 Dose:  17 gm


Tramadol HCl (Ultram -)  50 mg PO Q6H [facility]


   Last Admin: 03/25/17 10:43 Dose:  50 mg











Gen: NAD at rest


Heart:  RRR


Lung: CT x 2, serosang drainage, few rhonchi  on R, no wheezes


Abd: soft, nontender


Ext: no edema


Neuro: Intact





CXR: small residual R effusion, no pneumothorax


 


IMP: 


Pneumonia


R/O Parapneumonic Pleural Effusion


Possible subdiaphragmatic communication


(?) Mass 


Atelectasis


HTN


Hypothyroidism








Follow up pleural fluid analysis/path resultes


ABX coverage  as per ID, Sent Quant Gold


O2 as needed


Incentive Spirometry 


VTE prophylaxis 


Will need repeat abdominal imaging 


Daily CXR, 


ICU today, if stable can leave to Med/Surg tonight or tomorrow





Robinson Tai ACNP


[id number]





[id number]

## 2017-03-25 NOTE — PN
Progress Note (short form)





- Note


Progress Note: 


Subjective:


has cp at site of tube insertion . no SOB . no cough , has no abd pain . 





Objective:








Vital Signs:





 Last Vital Signs











Temp Pulse Resp BP Pulse Ox


 


 97.6 F   75   20   116/64   98 


 


 03/25/17 06:00  03/25/17 06:00  03/25/17 06:00  03/25/17 06:00  03/25/17 08:05











I&O:


 Intake & Output











 03/22/17 03/23/17 03/24/17 03/25/17





 23:59 23:59 23:59 23:59


 


Intake Total 1200 1550 3050 925


 


Output Total   1609 1275


 


Balance 1200 1550 1441 -350


 


Weight 218 lb 3 oz 217 lb 4 oz 218 lb 222 lb 14.4 oz














Physical Exam:


NAD 


CV: RRR


Lungs: CTAB , decreased breath sounds at R base. 


Ext : no edema on LE 


Abd : soft, ND , mild TTP in RUQ and RLQ , no rebound tenderness or guarding 


 Laboratory Results - last 24 hr











  03/24/17 03/24/17 03/25/17





  06:10 06:10 05:10


 


WBC   


 


RBC   


 


Hgb   


 


Hct   


 


MCV   


 


MCHC   


 


RDW   


 


Plt Count   


 


MPV   


 


Neutrophils %   


 


Lymphocytes %   


 


Monocytes %   


 


Eosinophils %   


 


Basophils %   


 


INR   


 


PTT (Actin FS)   


 


Sodium    138


 


Potassium    4.2


 


Chloride    102


 


Carbon Dioxide    30


 


Anion Gap    6 L


 


BUN    5 L D


 


Creatinine    0.4 L


 


Creat Clearance w eGFR    > 60


 


Random Glucose    102


 


Calcium    8.0 L


 


Iron  36  


 


TIBC  198 L  


 


Iron Saturation  18  


 


Ferritin   139.158 


 


Total Bilirubin    0.2  D


 


Direct Bilirubin   


 


AST    60 H D


 


ALT    83 H D


 


Alkaline Phosphatase    100  D


 


LD Total    150


 


C-Reactive Protein   


 


Total Protein    6.0 L


 


Albumin    2.3 L














  03/25/17 03/25/17 03/25/17





  05:10 05:10 05:10


 


WBC    10.9 H D


 


RBC    3.28 L


 


Hgb    9.3 L D


 


Hct    27.7 L


 


MCV    84.4


 


MCHC    33.7


 


RDW    14.3


 


Plt Count    474 H


 


MPV    7.2 L


 


Neutrophils %    81.5


 


Lymphocytes %    9.5  D


 


Monocytes %    8.5


 


Eosinophils %    0.1  D


 


Basophils %    0.4


 


INR  1.26 H  


 


PTT (Actin FS)  33.1  


 


Sodium   


 


Potassium   


 


Chloride   


 


Carbon Dioxide   


 


Anion Gap   


 


BUN   


 


Creatinine   


 


Creat Clearance w eGFR   


 


Random Glucose   


 


Calcium   


 


Iron   


 


TIBC   


 


Iron Saturation   


 


Ferritin   143.379 


 


Total Bilirubin   0.3  D 


 


Direct Bilirubin   0.1 


 


AST   58 H 


 


ALT   79 H 


 


Alkaline Phosphatase   101 


 


LD Total   


 


C-Reactive Protein   6.5 H D 


 


Total Protein   6.0 L 


 


Albumin   2.2 L 











ASSESSMENT AND PLAN:





Patient is a 57 y/o F w PMH of HTN, hypothyroidism presented to ER w/ R sided 

chest/abdominal pain x 2 days. Found to have R pleural effusion , Treated for 

CAP 





1- Exudative R pleural effusion : still not clear etiology. 





- VATS  , bx and pneumolysis, and CT placement yesterday . 


- repeat cxray today , to evaluate any pneumothorax. ( air sounds present, no 

suspicion ) 








2- Ascending colitis on CT scan: 


- cont ertapenem, until colonoscopy is done  


- stool studies pending if she has BM 


- colo and EGD early next week 








3- Mass adjacent to liver : reactive with granuloma , likely  around a dropped 

stone 


appreciate Sx input on this and on  fluid collection behind stomach  





4-HTN : cont home meds. 





Dispo : HLOC





ana alford 





Visit type





- Emergency Visit


Emergency Visit: Yes


ED Registration Date: 03/15/17


Care time: The patient presented to the Emergency Department on the above date 

and was hospitalized for further evaluation of their emergent condition.





- New Patient


This patient is new to me today: No





- Critical Care


Critical Care patient: No

## 2017-03-25 NOTE — PN
Progress Note (short form)





- Note


Progress Note: 


Anesthesia post op


Pt seen and examined


S;Alert and awake. Mild discomfort


O:


 Vital Signs











Temperature  97.6 F   03/25/17 06:00


 


Pulse Rate  75   03/25/17 06:00


 


Respiratory Rate  20   03/25/17 06:00


 


Blood Pressure  116/64   03/25/17 06:00


 


O2 Sat by Pulse Oximetry (%)  98   03/25/17 08:05








 CBC, BMP





 03/25/17 05:10 





 03/25/17 05:10 





A/P;


Current Active Problems





Liver mass, right lobe (Acute) 


Pleural effusion (Acute) 





s/p right VATS


Doing well post op


Continue current care


Kade Soni MD

## 2017-03-25 NOTE — PN
GI Progress Note


Subjective: 


s/p vats with pleural biopsy


pain at site of chest tubes


no abdominal pain





- Objective


Vital Signs: 


 Vital Signs











Temperature  97.6 F   03/25/17 06:00


 


Pulse Rate  68   03/25/17 08:39


 


Respiratory Rate  22   03/25/17 08:33


 


Blood Pressure  104/59   03/25/17 08:33


 


O2 Sat by Pulse Oximetry (%)  94 L  03/25/17 08:39











Constitutional: Calm


Eyes: No: Sclera Icterus


Cardiovascular: Yes: Regular Rate and Rhythm


Respiratory: Yes: Diminished (at right base)


Gastrointestinal Inspection: No: Distention


...Auscultate: Yes: Normoactive Bowel Sounds


...Palpate: No: Tenderness


Edema: No


Neurological: Yes: Alert, Oriented


Labs: 


 CBC, BMP





 03/25/17 05:10 





 03/25/17 05:10 





 INR, PTT











INR  1.26  (0.82-1.09)  H  03/25/17  05:10    


 


Fibrinogen  512.0 mg/dL (238-498)  H  03/16/17  08:05    














Assessment/Plan


Awaiting w/u re: pulmonary findings


Continue MiraLAX 


Obtain EGD/Colonoscopy reports from Ms. Krishna' gastroenterologist Dr. Haider

## 2017-03-26 LAB
ALBUMIN SERPL-MCNC: 2.3 G/DL (ref 3.4–5)
ALP SERPL-CCNC: 98 U/L (ref 45–117)
ALT SERPL-CCNC: 60 U/L (ref 12–78)
ANION GAP SERPL CALC-SCNC: 7 MMOL/L (ref 8–16)
AST SERPL-CCNC: 42 U/L (ref 15–37)
BILIRUB SERPL-MCNC: 0.4 MG/DL (ref 0.2–1)
CALCIUM SERPL-MCNC: 8.1 MG/DL (ref 8.5–10.1)
CO2 SERPL-SCNC: 33 MMOL/L (ref 21–32)
CREAT SERPL-MCNC: 0.5 MG/DL (ref 0.55–1.02)
CRP SERPL-MCNC: 5.4 MG/DL (ref 0–0.3)
DEPRECATED RDW RBC AUTO: 14.5 % (ref 11.6–15.6)
GLUCOSE SERPL-MCNC: 83 MG/DL (ref 74–106)
MAGNESIUM SERPL-MCNC: 2.3 MG/DL (ref 1.8–2.4)
MCH RBC QN AUTO: 29.2 PG (ref 25.7–33.7)
MCHC RBC AUTO-ENTMCNC: 34 G/DL (ref 32–36)
MCV RBC: 85.7 FL (ref 80–96)
PHOSPHATE SERPL-MCNC: 3.9 MG/DL (ref 2.5–4.9)
PLATELET # BLD AUTO: 483 K/MM3 (ref 134–434)
PMV BLD: 7.1 FL (ref 7.5–11.1)
PROT SERPL-MCNC: 6 G/DL (ref 6.4–8.2)
WBC # BLD AUTO: 7 K/MM3 (ref 4–10)

## 2017-03-26 RX ADMIN — ERTAPENEM SODIUM SCH MLS/HR: 1 INJECTION, POWDER, LYOPHILIZED, FOR SOLUTION INTRAMUSCULAR; INTRAVENOUS at 09:37

## 2017-03-26 RX ADMIN — POLYETHYLENE GLYCOL 3350 SCH GM: 17 POWDER, FOR SOLUTION ORAL at 15:22

## 2017-03-26 RX ADMIN — AMLODIPINE BESYLATE SCH MG: 10 TABLET ORAL at 09:36

## 2017-03-26 RX ADMIN — LEVOTHYROXINE SODIUM SCH MCG: 125 TABLET ORAL at 06:03

## 2017-03-26 RX ADMIN — IPRATROPIUM BROMIDE SCH: 0.5 SOLUTION RESPIRATORY (INHALATION) at 23:06

## 2017-03-26 RX ADMIN — KETOROLAC TROMETHAMINE SCH MG: 30 INJECTION INTRAMUSCULAR; INTRAVENOUS at 05:15

## 2017-03-26 RX ADMIN — HYDROCHLOROTHIAZIDE SCH MG: 12.5 CAPSULE ORAL at 09:36

## 2017-03-26 RX ADMIN — POLYETHYLENE GLYCOL 3350 SCH GRAMS: 17 POWDER, FOR SOLUTION ORAL at 22:00

## 2017-03-26 RX ADMIN — IPRATROPIUM BROMIDE SCH AMP: 0.5 SOLUTION RESPIRATORY (INHALATION) at 06:52

## 2017-03-26 RX ADMIN — KETOROLAC TROMETHAMINE SCH MG: 30 INJECTION INTRAMUSCULAR; INTRAVENOUS at 11:42

## 2017-03-26 RX ADMIN — HEPARIN SODIUM SCH UNIT: 5000 INJECTION, SOLUTION INTRAVENOUS; SUBCUTANEOUS at 15:22

## 2017-03-26 RX ADMIN — LOSARTAN POTASSIUM SCH MG: 50 TABLET, FILM COATED ORAL at 09:36

## 2017-03-26 RX ADMIN — IPRATROPIUM BROMIDE SCH AMP: 0.5 SOLUTION RESPIRATORY (INHALATION) at 18:19

## 2017-03-26 RX ADMIN — HEPARIN SODIUM SCH UNIT: 5000 INJECTION, SOLUTION INTRAVENOUS; SUBCUTANEOUS at 05:45

## 2017-03-26 RX ADMIN — POLYETHYLENE GLYCOL 3350 SCH GM: 17 POWDER, FOR SOLUTION ORAL at 05:46

## 2017-03-26 RX ADMIN — HEPARIN SODIUM SCH UNIT: 5000 INJECTION, SOLUTION INTRAVENOUS; SUBCUTANEOUS at 22:00

## 2017-03-26 RX ADMIN — KETOROLAC TROMETHAMINE SCH MG: 30 INJECTION INTRAMUSCULAR; INTRAVENOUS at 23:00

## 2017-03-26 RX ADMIN — IPRATROPIUM BROMIDE SCH AMP: 0.5 SOLUTION RESPIRATORY (INHALATION) at 12:03

## 2017-03-26 RX ADMIN — KETOROLAC TROMETHAMINE SCH MG: 30 INJECTION INTRAMUSCULAR; INTRAVENOUS at 18:27

## 2017-03-26 RX ADMIN — IPRATROPIUM BROMIDE SCH AMP: 0.5 SOLUTION RESPIRATORY (INHALATION) at 00:10

## 2017-03-26 NOTE — PN
Progress Note (short form)





- Note


Progress Note: 


Thoracic Surgery Note:





POD #2. Doing well. No fever. No WBC. CT without air-leak and drainage 

serosanguineous. CXR with expanded lung.


Recommend: to floor, continue ct's on suction until tomorrow, oob tid, would 

repeat CT to see abdominal findings for comparison on Monday.


Continue to f/u cultures.

## 2017-03-26 NOTE — PN
Progress Note (short form)





- Note


Progress Note: 


Subjective:


has cp at site of tube insertion . no SOB . no cough , has no abd pain . 





Objective:








Vital Signs:





 


 Last Vital Signs











Temp Pulse Resp BP Pulse Ox


 


 97.8 F   69   18   98/62   93 L


 


 03/26/17 06:00  03/26/17 06:00  03/26/17 06:00  03/26/17 06:00  03/25/17 22:00














I&O:


 


 Intake & Output











 03/23/17 03/24/17 03/25/17 03/26/17





 23:59 23:59 23:59 23:59


 


Intake Total 1550 3050 2175 300


 


Output Total  1609 1970 20


 


Balance 1550 1441 205 280


 


Weight 217 lb 4 oz 218 lb 222 lb 14.4 oz 219 lb 12.8 oz

















Physical Exam:


NAD 


CV: RRR


Lungs: CTAB , decreased breath sounds at R base. 


Ext : no edema on LE 


Abd : soft, ND ,NT , NL BS 


 


 Laboratory Results - last 24 hr











  03/23/17 03/25/17 03/26/17





  06:40 05:10 05:20


 


WBC    7.0  D


 


RBC    3.31 L


 


Hgb    9.7 L


 


Hct    28.4 L


 


MCV    85.7


 


MCHC    34.0


 


RDW    14.5


 


Plt Count    483 H


 


MPV    7.1 L


 


Sodium   


 


Potassium   


 


Chloride   


 


Carbon Dioxide   


 


Anion Gap   


 


BUN   


 


Creatinine   


 


Creat Clearance w eGFR   


 


Random Glucose   


 


Calcium   


 


Phosphorus   


 


Magnesium   


 


Total Bilirubin   


 


AST   


 


ALT   


 


Alkaline Phosphatase   


 


C-Reactive Protein   


 


Total Protein   


 


Albumin   


 


Tumor Marker AFP   1.9 


 


Carcinoembryonic Ag   1.0 


 


Crossmatch IS Only  See Detail  














  03/26/17 03/26/17





  05:20 05:20


 


WBC  


 


RBC  


 


Hgb  


 


Hct  


 


MCV  


 


MCHC  


 


RDW  


 


Plt Count  


 


MPV  


 


Sodium  139 


 


Potassium  4.0 


 


Chloride  99 


 


Carbon Dioxide  33 H 


 


Anion Gap  7 L 


 


BUN  7  D 


 


Creatinine  0.5 L D 


 


Creat Clearance w eGFR  > 60 


 


Random Glucose  83 


 


Calcium  8.1 L 


 


Phosphorus  3.9 


 


Magnesium  2.3 


 


Total Bilirubin  0.4  D 


 


AST  42 H D 


 


ALT  60  D 


 


Alkaline Phosphatase  98 


 


C-Reactive Protein   Cancelled


 


Total Protein  6.0 L 


 


Albumin  2.3 L 


 


Tumor Marker AFP  


 


Carcinoembryonic Ag  


 


Crossmatch IS Only  











ASSESSMENT AND PLAN:





Patient is a 55 y/o F w PMH of HTN, hypothyroidism presented to ER w/ R sided 

chest/abdominal pain x 2 days. Found to have R pleural effusion , Treated for 

CAP 





1- Exudative R pleural effusion : still not clear etiology. 





- VATS  , bx and pneumolysis, and CT placement 3/24. 


- no evidence of pneumothorax 


- cont oxycodone , dc tramadol . cont toradol 





2- Ascending colitis on CT scan: 


- cont ertapenem, until colonoscopy is done  


- cont miralax 


- colo and EGD early next week 








3- Mass adjacent to liver : reactive with granuloma , likely  around a dropped 

stone 


still not seen by surgery service for  input on this and on  fluid collection 

behind stomach  . 


will recontact sx today 





4-HTN : cont home meds. 





Dispo : HLOC











Visit type





- Emergency Visit


Emergency Visit: Yes


ED Registration Date: 03/15/17


Care time: The patient presented to the Emergency Department on the above date 

and was hospitalized for further evaluation of their emergent condition.





- New Patient


This patient is new to me today: No





- Critical Care


Critical Care patient: Yes


Total Critical Care Time (in minutes): 20

## 2017-03-26 NOTE — PN
Progress Note (short form)





- Note


Progress Note: 


Thoracic Surgery- Dr. Novak


Patient seen and examined, discussed with nursing.  Patient was having some 

pain this morning, pain medication helped.  She has been OOB, able to cough and 

breath deeply.  She denies fever, chills, sob, N/V.





 Last Vital Signs











Temp Pulse Resp BP Pulse Ox


 


 97.8 F   69   18   98/62   93 L


 


 03/26/17 06:00  03/26/17 06:00  03/26/17 06:00  03/26/17 06:00  03/25/17 22:00








 CBC, BMP





 03/26/17 05:20 





 03/26/17 05:20 





Exam:


Gen: NAD, resting in bed


Cardio: RRR


Resp: CTA, chest tubes in place with serosanguineous draiange, output 10ml and 

10 ml overnight, no air leak














A/P


POD#2 s/p  bronchoscopy, Right VATS, pneumolysis, pleural biopsy, drainage of 

pleural effusion, insertion of chest tubex2





Patient discussed with Dr. Gutierrez


Continue CTx2 to suction, strip/milk tubes q4-6h


OOB


Pain control


Patient may be downgraded to floor

## 2017-03-26 NOTE — PN
Progress Note, Physician


Chief Complaint: 


ID








Sitting in chair more comfortable from the standpoint of post op pain





No fever  Has been constitipated  Fever initially 24 hours but this gone now 

for 7 days





All cultures bacterial no growth





- Current Medication List


Current Medications: 


Active Medications





Acetaminophen (Tylenol -)  650 mg PO Q6H PRN


   PRN Reason: FEVER OR PAIN


   Last Admin: 03/25/17 21:32 Dose:  650 mg


Amlodipine Besylate (Norvasc -)  10 mg PO DAILY [facility]


   Last Admin: 03/25/17 10:22 Dose:  10 mg


Fentanyl (Sublimaze Injection -)  50 mcg IVPUSH [id number] PRN


   PRN Reason: PAIN


   Stop: 03/27/17 11:09


Heparin Sodium (Porcine) (Heparin -)  5,000 unit SQ TID [facility]


   Last Admin: 03/26/17 05:45 Dose:  5,000 unit


Hydrochlorothiazide (Hctz -)  12.5 mg PO DAILY [facility]


   Last Admin: 03/25/17 10:22 Dose:  12.5 mg


Ertapenem 1 gm/ Sodium (Chloride)  50 mls @ 100 mls/hr IVPB DAILY [facility]


   Last Admin: 03/25/17 10:22 Dose:  100 mls/hr


Ipratropium Bromide (Atrovent 0.02% Nebulizer -)  1 amp NEB QIDR [facility]


   Last Admin: 03/26/17 06:52 Dose:  1 amp


Ketorolac Tromethamine (Toradol Injection -)  15 mg IVPUSH Q6H [facility]


   Stop: 03/27/17 08:59


   Last Admin: 03/26/17 05:15 Dose:  15 mg


Levothyroxine Sodium (Synthroid -)  125 mcg PO DAILY@0700 [facility]


   Last Admin: 03/26/17 06:03 Dose:  125 mcg


Losartan Potassium (Cozaar -)  50 mg PO DAILY [facility]


   Last Admin: 03/25/17 10:22 Dose:  50 mg


Ondansetron HCl (Zofran Injection)  4 mg IVPUSH Q6H PRN


   PRN Reason: NAUSEA AND/OR VOMITING


   Last Admin: 03/25/17 01:00 Dose:  4 mg


Oxycodone HCl (Roxicodone -)  5 mg PO Q4H PRN


   PRN Reason: PAIN


Oxycodone HCl (Roxicodone -)  10 mg PO Q4H PRN


   PRN Reason: PAIN


   Last Admin: 03/25/17 21:31 Dose:  10 mg


Polyethylene Glycol (Miralax (For Daily Use) -)  17 gm PO TID [facility]


   Last Admin: 03/26/17 05:46 Dose:  17 gm


Tramadol HCl (Ultram -)  50 mg PO Q6H [facility]


   Last Admin: 03/26/17 05:49 Dose:  50 mg











- Objective


Vital Signs: 


 Vital Signs











Temperature  97.8 F   03/26/17 06:00


 


Pulse Rate  69   03/26/17 06:00


 


Respiratory Rate  18   03/26/17 06:00


 


Blood Pressure  98/62   03/26/17 06:00


 


O2 Sat by Pulse Oximetry (%)  93 L  03/25/17 22:00











Constitutional: Yes: Well Nourished, No Distress


HENT: Yes: WNL, Atraumatic


Neck: Yes: WNL, Supple


Cardiovascular: Yes: Regular Rate and Rhythm, S2.  No: Murmur


Respiratory: Yes: WNL, Regular, CTA Bilaterally, Other (2 chest tubes).  No: 

Rales, Rhonchi


Gastrointestinal: Yes: WNL, Normal Bowel Sounds, Soft.  No: Palpable Mass, 

Splenomegaly, Tenderness, Rebound


Extremities: No: Cold, Cool, Cyanosis


Edema: No


Labs: 


 CBC, BMP





 03/26/17 05:20 





 03/26/17 05:20 





 INR, PTT











INR  1.26  (0.82-1.09)  H  03/25/17  05:10    


 


Fibrinogen  512.0 mg/dL (238-498)  H  03/16/17  08:05    














Assessment/Plan


Microbiology





03/24/17 10:02   Biopsy Tissue   Gram Stain - Final


03/24/17 09:57   Tissue-Other   Gram Stain - Final


03/24/17 10:02   Biopsy Tissue   Tissue Culture - Preliminary


                              NO AEROBIC GROWTH, 24 HRS


03/24/17 09:57   Tissue-Other   Tissue Culture - Preliminary


                              NO AEROBIC GROWTH, 24 HRS





 Laboratory Tests











  03/26/17





  05:20


 


WBC  7.0  D


 


Hgb  9.7 L


 


Plt Count  483 H








Assessment  Post op day 2 VAT  "fibrinous changes with adhesions" seen on 

thoracoscopy Original and op cultures


                         all no growth Fluid was not grossly purulent though 

was cloudy looking.  Gram stain negative 


                        





Plan                Puzzling case in which patient has what appears to be 

parapneumonic effusion without obvious pneumonia


                       Suggestion of possible communication or relationship to 

inflammatory process "mass near liver" again unclear


                       whats causing this.  Initially febrile for 24 hours but 

afebrile for a week with normal WBC count( initially 14K)


                       No clinical evidence for colitis pain diarrhea fever WBC.

( CT colon thickening).  Continue antibiotic for now while awaiting


                       Pathology results. Quant gold and Histo AG sent though 

this seems unlikely too given absence of symptoms fever ect


                       Critical care time spent reviewing above exam





Dayna EASTMAN

## 2017-03-26 NOTE — PN
Progress Note (short form)





- Note


Progress Note: 


Seen and examined in the ICU


afebrile


C/o: chest pain at tube sites


Still no BM x 48hrs


Denies: nausea, HA





 Current Medications





Acetaminophen (Tylenol -)  650 mg PO Q6H PRN


   PRN Reason: FEVER OR PAIN


   Last Admin: 03/25/17 21:32 Dose:  650 mg


Amlodipine Besylate (Norvasc -)  10 mg PO DAILY [facility]


   Last Admin: 03/25/17 10:22 Dose:  10 mg


Fentanyl (Sublimaze Injection -)  50 mcg IVPUSH [id number] PRN


   PRN Reason: PAIN


   Stop: 03/27/17 11:09


Heparin Sodium (Porcine) (Heparin -)  5,000 unit SQ TID [facility]


   Last Admin: 03/26/17 05:45 Dose:  5,000 unit


Hydrochlorothiazide (Hctz -)  12.5 mg PO DAILY [facility]


   Last Admin: 03/25/17 10:22 Dose:  12.5 mg


Ertapenem 1 gm/ Sodium (Chloride)  50 mls @ 100 mls/hr IVPB DAILY [facility]


   Last Admin: 03/25/17 10:22 Dose:  100 mls/hr


Ipratropium Bromide (Atrovent 0.02% Nebulizer -)  1 amp NEB QIDR [facility]


   Last Admin: 03/26/17 06:52 Dose:  1 amp


Ketorolac Tromethamine (Toradol Injection -)  15 mg IVPUSH Q6H [facility]


   Stop: 03/27/17 08:59


   Last Admin: 03/26/17 05:15 Dose:  15 mg


Levothyroxine Sodium (Synthroid -)  125 mcg PO DAILY@0700 [facility]


   Last Admin: 03/26/17 06:03 Dose:  125 mcg


Losartan Potassium (Cozaar -)  50 mg PO DAILY [facility]


   Last Admin: 03/25/17 10:22 Dose:  50 mg


Ondansetron HCl (Zofran Injection)  4 mg IVPUSH Q6H PRN


   PRN Reason: NAUSEA AND/OR VOMITING


   Last Admin: 03/25/17 01:00 Dose:  4 mg


Oxycodone HCl (Roxicodone -)  5 mg PO Q4H PRN


   PRN Reason: PAIN


Oxycodone HCl (Roxicodone -)  10 mg PO Q4H PRN


   PRN Reason: PAIN


   Last Admin: 03/25/17 21:31 Dose:  10 mg


Polyethylene Glycol (Miralax (For Daily Use) -)  17 gm PO TID [facility]


   Last Admin: 03/26/17 05:46 Dose:  17 gm


Tramadol HCl (Ultram -)  50 mg PO Q6H [facility]


   Last Admin: 03/26/17 05:49 Dose:  50 mg


 Vital Signs











 Period  Temp  Pulse  Resp  BP Sys/Olivares  Pulse Ox


 


 Last 24 Hr  97.8 F-99 F  62-80  14-22  /53-85  93-93














Exam:


General: awake, alert and cooperative


CV: S1, S2 no m/r/g


Pulm: CTA, chest tube right x2 serosang drainage no airleak


Abd: SNTND


Ext: WWP, trace edema


Neuro: CN grossly intact 





 CBCD











WBC  7.0 K/mm3 (4.0-10.0)  D 03/26/17  05:20    


 


RBC  3.31 M/mm3 (3.60-5.2)  L  03/26/17  05:20    


 


Hgb  9.7 GM/dL (10.7-15.3)  L  03/26/17  05:20    


 


Hct  28.4 % (32.4-45.2)  L  03/26/17  05:20    


 


MCV  85.7 fl (80-96)   03/26/17  05:20    


 


MCHC  34.0 g/dl (32.0-36.0)   03/26/17  05:20    


 


RDW  14.5 % (11.6-15.6)   03/26/17  05:20    


 


Plt Count  483 K/MM3 (134-434)  H  03/26/17  05:20    


 


MPV  7.1 fl (7.5-11.1)  L  03/26/17  05:20    








 CMP











Sodium  139 mmol/L (136-145)   03/26/17  05:20    


 


Potassium  4.0 mmol/L (3.5-5.1)   03/26/17  05:20    


 


Chloride  99 mmol/L ()   03/26/17  05:20    


 


Carbon Dioxide  33 mmol/L (21-32)  H  03/26/17  05:20    


 


Anion Gap  7  (8-16)  L  03/26/17  05:20    


 


BUN  7 mg/dL (7-18)  D 03/26/17  05:20    


 


Creatinine  0.5 mg/dL (0.55-1.02)  L D 03/26/17  05:20    


 


Creat Clearance w eGFR  > 60  (>60)   03/26/17  05:20    


 


Random Glucose  83 mg/dL ()   03/26/17  05:20    


 


Calcium  8.1 mg/dL (8.5-10.1)  L  03/26/17  05:20    


 


Total Bilirubin  0.4 mg/dL (0.2-1.0)  D 03/26/17  05:20    


 


AST  42 U/L (15-37)  H D 03/26/17  05:20    


 


ALT  60 U/L (12-78)  D 03/26/17  05:20    


 


Alkaline Phosphatase  98 U/L ()   03/26/17  05:20    


 


Total Protein  6.0 g/dl (6.4-8.2)  L  03/26/17  05:20    


 


Albumin  2.3 g/dl (3.4-5.0)  L  03/26/17  05:20    








 CARDIAC ENZYMES











Creatine Kinase  41 IU/L ()   03/15/17  18:02    


 


Troponin I  < 0.02 ng/ml (0.00-0.05)   03/15/17  18:02    








 Microbiology





03/24/17 09:59   Bronchial Washings - Right Lower Lobe   Gram Stain - Final


03/24/17 09:59   Bronchial Washings - Right Lower Lobe   Bronchoalveolar Lavage 

Culture - Preliminary


03/24/17 09:49   Pleural Fluid   Gram Stain - Final


03/24/17 09:49   Pleural Fluid   Body Fluid Culture - Preliminary


                            NO AEROBIC GROWTH, 24 HRS


03/24/17 10:02   Biopsy Tissue   Gram Stain - Final


03/24/17 10:02   Biopsy Tissue   Tissue Culture - Preliminary


                            NO AEROBIC GROWTH, 24 HRS


03/24/17 09:57   Tissue-Other   Gram Stain - Final


03/24/17 09:57   Tissue-Other   Tissue Culture - Preliminary


                            NO AEROBIC GROWTH, 24 HRS


03/24/17 09:53   Tissue-Other   Gram Stain - Final


03/24/17 09:53   Tissue-Other   Tissue Culture - Preliminary


03/21/17 12:45   Pleural Fluid   AFB Smear Concentration - Final


03/21/17 12:45   Pleural Fluid   Mycobacterial Culture - Preliminary


03/24/17 09:49   Pleural Fluid   KOH Preparation - Preliminary


03/24/17 09:49   Pleural Fluid   Fungal Culture - Preliminary


03/24/17 09:59   Bronchial Washings - Right Lower Lobe   GURPREET Preparation - 

Preliminary


03/24/17 09:59   Bronchial Washings - Right Lower Lobe   Fungal Culture - 

Preliminary


03/21/17 12:45   Pleural Fluid   Gram Stain - Final


03/21/17 12:45   Pleural Fluid   Body Fluid Culture - Final


                            NO GROWTH OF AEROBIC ORGANISMS AFTER 48 HOURS 

INCUBATION


03/21/17 12:45   Pleural Fluid   Anaerobic Culture - Final


                            NO ANAEROBES WERE ISOLATED


03/21/17 12:45   Aspirate   Gram Stain - Final


03/21/17 12:45   Aspirate   Body Fluid Culture - Final


                            NO GROWTH OF AEROBIC ORGANISMS AFTER 48 HOURS 

INCUBATION


03/21/17 12:45   Aspirate   Anaerobic Culture - Final


                            NO ANAEROBES WERE ISOLATED


03/18/17 22:37   Blood - Peripheral Venous   Blood Culture - Final


                            NO GROWTH AFTER 5 DAYS INCUBATION


03/18/17 22:37   Blood - Peripheral Venous   Blood Culture - Final


                            NO GROWTH AFTER 5 DAYS INCUBATION


03/21/17 13:50   Pleural Fluid   KOH Preparation - Preliminary


03/21/17 13:50   Pleural Fluid   Fungal Culture - Preliminary


03/15/17 18:02   Blood - Peripheral Venous   Blood Culture - Final


                            NO GROWTH AFTER 5 DAYS INCUBATION


03/15/17 18:02   Blood - Peripheral Venous   Blood Culture - Final


                            NO GROWTH AFTER 5 DAYS INCUBATION


03/16/17 15:30   Blood - Peripheral Venous   TB Test (QFT) (MALI) - Final


03/16/17 11:25   Pleural Fluid   AFB Smear Concentration - Final


03/16/17 11:25   Pleural Fluid   Mycobacterial Culture - Preliminary


03/16/17 11:25   Pleural Fluid   Gram Stain - Final


03/16/17 11:25   Pleural Fluid   Body Fluid Culture - Final


                            NO GROWTH OF AEROBIC ORGANISMS AFTER 48 HOURS 

INCUBATION


03/16/17 11:25   Pleural Fluid   Anaerobic Culture - Final


                            NO ANAEROBES WERE ISOLATED


03/17/17 04:00   Urine - Urine Clean Catch   Urine Culture - Final


                            NO GROWTH OBTAINED


03/17/17 04:00   Urine - Urine Clean Catch   Legionella Antigen - Final


03/17/17 04:00   Urine - Urine Clean Catch   Streptococcus pneumoniae Antigen (

M - Final


03/16/17 11:25   Pleural Fluid   KOH Preparation - Preliminary


03/16/17 11:25   Pleural Fluid   Fungal Culture - Preliminary





CXR: chest tubes on right side, overall unchanged





IMP: 


Pneumonia


R/O Parapneumonic Pleural Effusion


Possible subdiaphragmatic communication


(?) Mass 


Atelectasis


HTN


Hypothyroidism





Follow up pleural fluid analysis/path results


ABX coverage  as per ID


Currently on RA, 02 if needed to keep Sat >92%


Incentive Spirometry 


Early PT, OOB to chair as tolerated


VTE prophylaxis 


Pain management, will add lidoderm patch to right chest wall


Chest tube managment per CT surgery, will likely remove apical CT tomorrow


Cont bowel regimen


Cont diet


Daily CXR





stable for floor transfer





Bruna ACNP


Pulm/CCM





CCT: 35m

## 2017-03-26 NOTE — PN
Progress Note (short form)





- Note


Progress Note: 


Patient examined , chart reviewed. Results of scans and recent VATS noted.


EGD and colonoscopy would be helpful.


 Will continue to follow.

## 2017-03-27 LAB
ANION GAP SERPL CALC-SCNC: 9 MMOL/L (ref 8–16)
CALCIUM SERPL-MCNC: 8.1 MG/DL (ref 8.5–10.1)
CO2 SERPL-SCNC: 32 MMOL/L (ref 21–32)
CREAT SERPL-MCNC: 0.5 MG/DL (ref 0.55–1.02)
DEPRECATED RDW RBC AUTO: 14.3 % (ref 11.6–15.6)
GLUCOSE SERPL-MCNC: 98 MG/DL (ref 74–106)
MAGNESIUM SERPL-MCNC: 2.5 MG/DL (ref 1.8–2.4)
MCH RBC QN AUTO: 28.9 PG (ref 25.7–33.7)
MCHC RBC AUTO-ENTMCNC: 34.3 G/DL (ref 32–36)
MCV RBC: 84.4 FL (ref 80–96)
PHOSPHATE SERPL-MCNC: 4.6 MG/DL (ref 2.5–4.9)
PLATELET # BLD AUTO: 411 K/MM3 (ref 134–434)
PMV BLD: 7 FL (ref 7.5–11.1)
WBC # BLD AUTO: 7.2 K/MM3 (ref 4–10)

## 2017-03-27 RX ADMIN — POLYETHYLENE GLYCOL 3350 SCH GRAMS: 17 POWDER, FOR SOLUTION ORAL at 05:06

## 2017-03-27 RX ADMIN — LOSARTAN POTASSIUM SCH: 50 TABLET, FILM COATED ORAL at 10:01

## 2017-03-27 RX ADMIN — POLYETHYLENE GLYCOL 3350 SCH GRAMS: 17 POWDER, FOR SOLUTION ORAL at 14:35

## 2017-03-27 RX ADMIN — IPRATROPIUM BROMIDE SCH AMP: 0.5 SOLUTION RESPIRATORY (INHALATION) at 23:01

## 2017-03-27 RX ADMIN — IPRATROPIUM BROMIDE SCH AMP: 0.5 SOLUTION RESPIRATORY (INHALATION) at 17:15

## 2017-03-27 RX ADMIN — HYDROCHLOROTHIAZIDE SCH: 12.5 CAPSULE ORAL at 10:01

## 2017-03-27 RX ADMIN — IPRATROPIUM BROMIDE SCH AMP: 0.5 SOLUTION RESPIRATORY (INHALATION) at 11:19

## 2017-03-27 RX ADMIN — LEVOTHYROXINE SODIUM SCH MCG: 125 TABLET ORAL at 06:10

## 2017-03-27 RX ADMIN — LIDOCAINE SCH PATCH: 50 PATCH TOPICAL at 10:02

## 2017-03-27 RX ADMIN — POLYETHYLENE GLYCOL 3350 SCH: 17 POWDER, FOR SOLUTION ORAL at 21:55

## 2017-03-27 RX ADMIN — ERTAPENEM SODIUM SCH MLS/HR: 1 INJECTION, POWDER, LYOPHILIZED, FOR SOLUTION INTRAMUSCULAR; INTRAVENOUS at 09:41

## 2017-03-27 RX ADMIN — HEPARIN SODIUM SCH UNIT: 5000 INJECTION, SOLUTION INTRAVENOUS; SUBCUTANEOUS at 21:55

## 2017-03-27 RX ADMIN — IPRATROPIUM BROMIDE SCH AMP: 0.5 SOLUTION RESPIRATORY (INHALATION) at 06:52

## 2017-03-27 RX ADMIN — HEPARIN SODIUM SCH UNIT: 5000 INJECTION, SOLUTION INTRAVENOUS; SUBCUTANEOUS at 05:06

## 2017-03-27 RX ADMIN — SENNOSIDES SCH TAB: 8.6 TABLET, FILM COATED ORAL at 21:55

## 2017-03-27 RX ADMIN — KETOROLAC TROMETHAMINE SCH MG: 30 INJECTION INTRAMUSCULAR; INTRAVENOUS at 04:59

## 2017-03-27 RX ADMIN — HEPARIN SODIUM SCH UNIT: 5000 INJECTION, SOLUTION INTRAVENOUS; SUBCUTANEOUS at 14:11

## 2017-03-27 NOTE — PN
Teaching Attending Note


Name of Resident: Dirk Mars


ATTENDING PHYSICIAN STATEMENT





I saw and evaluated the patient.


I reviewed the resident's note and discussed the case with the resident.


I agree with the resident's findings and plan as documented.








SUBJECTIVE:


R sided chest pain at site of CT insertion . no fever ro chills. Over night BP 

was low but pt wasnot symptomatic 








OBJECTIVE:


NAD 


CV: RRR


Lungs: CTAB , decreased breath sounds at R base. 


Ext : no edema on LE 


Abd : soft, ND ,NT , NL BS 


 


 





ASSESSMENT AND PLAN:





Patient is a 57 y/o F w PMH of HTN, hypothyroidism presented to ER w/ R sided 

chest/abdominal pain x 2 days. Found to have R pleural effusion , Treated for 

CAP 





1- Exudative R pleural effusion: PAthology of pleural bx  results noted ...? 

organized empyema . these findings were d/w Dr. Finkelstein , those findings 

could be due to infection or inflammation. ALso, spoke to Dr. Novak, who 

strongly suspect the phlegmon under the diaphragm is contributing to the 

pleural effusion . 


and Recs were made for repeat CT scan of C/A/P 


- will repeat scan C/A/P 


-  depending on findings , might ask sx to drain the R makayla-hepatic phlegmon 


- cont abx for  now


- cont pain control 


- plan fro removing one CT tomorrow , then another one on Wednesday  








2- Ascending colitis on CT scan: 


- cont ertapenem for now 


- cont miralax 


- colo and EGD , hopefully when CT are removed 








3- Mass adjacent to liver : reactive with granuloma , likely  around a dropped 

stone 


will d/w sx after scan results 





4-HTN : hold norvasc and place  holding parameters on HCTZ/losartan. 








Dispo : [facility]























Critical Care


Total Critical Care Time (in minutes): 35


Critical Care Statement: The care of this patient involved high complexity 

decision making to prevent further life threatening deterioration of the patient

's condition and/or to evalute & treat vital organ system(s) failure or risk of 

failure.

## 2017-03-27 NOTE — PN
Physical Exam: 


SUBJECTIVE: Patient seen and examined at bedside in the ICU 


complains of pain at chest tube insertion site 


feels well overall 











OBJECTIVE:





 Vital Signs











 Period  Temp  Pulse  Resp  BP Sys/Olivares  Pulse Ox


 


 Last 24 Hr  98 F-99 F  68-94  16-86  /50-79  93-95











GENERAL: The patient is awake, alert, and fully oriented, in no acute distress. 


HEAD: Normal with no signs of trauma. 


NECK:  supple. 


LUNGS:  diminished breath sounds on the right with crackles. left side is 

clear. 2 chest tubes in places with serous drainage no air leak 


HEART: Regular rate and rhythm, S1, S2 without murmur, rub or gallop.


ABDOMEN: ABD soft non tender non distended 


NEUROLOGICAL: Cranial nerves II through XII grossly intact. Normal speech 


PSYCH: Normal mood, normal affect.




















 Laboratory Results - last 24 hr











  03/25/17 03/27/17 03/27/17





  05:10 05:05 05:05


 


WBC   7.2 


 


RBC   3.35 L 


 


Hgb   9.7 L 


 


Hct   28.2 L 


 


MCV   84.4 


 


MCHC   34.3 


 


RDW   14.3 


 


Plt Count   411 


 


MPV   7.0 L 


 


Sodium    137


 


Potassium    3.7


 


Chloride    96 L


 


Carbon Dioxide    32


 


Anion Gap    9


 


BUN    7


 


Creatinine    0.5 L


 


Random Glucose    98


 


Calcium    8.1 L


 


Phosphorus    4.6


 


Magnesium    2.5 H


 


Hepatitis C Antibody  0.2  








Active Medications











Generic Name Dose Route Start Last Admin





  Trade Name Freq  PRN Reason Stop Dose Admin


 


Acetaminophen  650 mg 03/26/17 17:35 03/26/17 20:46





  Tylenol -  PO   650 mg





  Q6H PRN   Administration





  FEVER OR PAIN   


 


Heparin Sodium (Porcine)  5,000 unit 03/26/17 22:00 03/27/17 14:11





  Heparin -  SQ   5,000 unit





  TID ROBIN   Administration


 


Hydrochlorothiazide  12.5 mg 03/27/17 10:00 03/27/17 10:01





  Hctz -  PO   Not Given





  DAILY ROBIN   


 


Ertapenem 1 gm/ Sodium  50 mls @ 100 mls/hr 03/27/17 10:00 03/27/17 09:41





  Chloride  IVPB   100 mls/hr





  DAILY ROBIN   Administration


 


Ipratropium Bromide  1 amp 03/26/17 18:00 03/27/17 11:19





  Atrovent 0.02% Nebulizer -  NEB   1 amp





  QIDR ROBIN   Administration


 


Levothyroxine Sodium  125 mcg 03/27/17 07:00 03/27/17 06:10





  Synthroid -  PO   125 mcg





  DAILY@0700 ROBIN   Administration


 


Lidocaine  1 patch 03/27/17 10:00 03/27/17 10:02





  Lidoderm Patch -  TP   1 patch





  DAILY ROBIN   Administration


 


Losartan Potassium  50 mg 03/27/17 10:00 03/27/17 10:01





  Cozaar -  PO   Not Given





  DAILY ROBIN   


 


Ondansetron HCl  4 mg 03/26/17 17:35  





  Zofran Injection  IVPUSH   





  Q6H PRN   





  NAUSEA AND/OR VOMITING   


 


Oxycodone HCl  5 mg 03/26/17 17:35  





  Roxicodone -  PO   





  Q4H PRN   





  PAIN   


 


Oxycodone HCl  10 mg 03/26/17 17:35 03/27/17 08:30





  Roxicodone -  PO   10 mg





  Q4H PRN   Administration





  PAIN   


 


Polyethylene Glycol  17 gm 03/26/17 22:00 03/27/17 05:06





  Miralax (For Daily Use) -  PO   17 grams





  TID ROBIN   Administration


 


Tramadol HCl  50 mg 03/26/17 18:00 03/27/17 14:10





  Ultram -  PO   50 mg





  Q6H ROBIN   Administration











ASSESSMENT/PLAN:


57 y/o F w/PMH of HTN, hypothyroidism presented to ER w/ R sided chest/

abdominal pain x 2 days. Found to have R pleural effusion. she is now POD#3 s/p 

 bronchoscopy, Right VATS, pneumolysis, pleural biopsy, drainage of pleural 

effusion, insertion of chest tubex2





Right pleural effusion/Community acquired pneumonia/Sepsis (leukocytosis and 

fever): community acquired pneumonia with parapneumonic effusion with pleuritic 

chest pain. at this point i do not think the patient had a pneumonia as her 

chest reaccumulated with pleural effusions so quickly with antibiotics  


s/p thoracentesis x2


patient had reaccumulation of fluid pulmonary requested repeat thoracentesis 

and second one was done 3/21/17


ID consult appreciated 


Azithromycin and Ceftriaxone stopped on day 5 


continue ertapenem day 8 given possible stone dropped in abdomen during 

cholecystectomy 


pleural fluid studies-shows inital thoracentesis was exudative likely 

parapneuomonic effusion-cytology negative-cytology shows inflammatory cells 


Afebrile 


f/u pleural fluid studies is not revealing any infectious source 


pulmonary consult appreciated   


CT chest results noted 


Chest tube to waterseal per CT surgery for possible removal of one chest tube 

today or tomorrow 


pain control is very important for this patient so she could take deep breaths 

and does not splint. as well as incentive spirometry. this was explained to the 

patient in detail.  


WIll send complement Anti SS and Anti DS DNA 


Will repeat CT Chest ABD/Pelv today 


Pleural fluid exudative-per pathology report possible organizing empyema 





abdominal wall mass: this was seen on chest CT also read as a mass on abdominal 

CT after reviewing the scans with radiology they do not feel there is a mass in 

the liver. it is likely part of the abdominal wall vs a dropped stone from her 

cholecystectomy 


LFTs WNL 


will see if right flank/chest/abdominal pain improves with treatment of 

pneumonia-no significant improvement 


Repeat Abdominal CT noted


GI consult 


aspirated on 3/21/17 by IR- pathology report inflammatory cells 


Surgery consult appreciated-may need surgical drainage 





Ascending colitis seen on CT scan: Patient now with diarrhea 


infectious vs inflammatory


GI consult appreciated-possible Colonoscopy and EGD on this admission


Surgery consult appreciated-agrees with GI plan 


Continue ertapenem 


Stool studies and C diff-pending 


 





Mass posterior to stomach:


new mass seen on CT scan that was not there on prior CT on this admission


GI consult appreciated possible colonoscopy on this admission next week 








HTN-patient is currently borderline hypotensive 


Hold Norvasc for now 


continue  Losartan and HCTZ with hold parameters 





Hypothyroidism


Synthroid 125mcg qam 





-FEN


no IVF 


no electrolyte issues  


CLD advance as tolerated 





PPx:


HSQ/SCDs


No criteria met for GI PPx


PT consult to avoid deconditioning as she had a major surgery 











Visit type





- Emergency Visit


Emergency Visit: Yes


ED Registration Date: 03/15/17


Care time: The patient presented to the Emergency Department on the above date 

and was hospitalized for further evaluation of their emergent condition.





- New Patient


This patient is new to me today: No





- Critical Care


Critical Care patient: Yes


Total Critical Care Time (in minutes): 45


Critical Care Statement: The care of this patient involved high complexity 

decision making to prevent further life threatening deterioration of the patient

's condition and/or to evalute & treat vital organ system(s) failure or risk of 

failure.

## 2017-03-27 NOTE — PATH
Cytology Non-Gynecological Report



Patient Name:  ISMAEL LAY

Accession #:  

Med. Rec. #:  [id number]                                                        

   /Age/Gender:  1960 (Age: 56) / F

Account:  [id number]                                                          

             Location: ICU 

Taken:  3/24/2017

Received:  3/24/2017

Reported:  3/27/2017

Physicians:  DANIAL Srinivasan M.D. Peter B. Berkey, M.D.

  



Specimen(s) Received

 PLEURAL FLUID 





Clinical History

Pleural effusion, empyema







Final Diagnosis

PLEURAL FLUID:

SATISFACTORY FOR EVALUATION.

NO MALIGNANT CELLS IDENTIFIED.

REACTIVE MESOTHELIAL CELLS, HISTIOCYTES AND MIXED INFLAMMATORY CELLS INCLUDING

NEUTROPHILS.



Comment: Also refer to [id number] for the pleural biopsy results.





***Electronically Signed***

Alexander Finkelstein, M.D.





Gross Description

Received is 50 cc of yellow fluid fresh. Two cytofunnel slide and one cell block

are made.

## 2017-03-27 NOTE — PN
Teaching Attending Note


Name of Resident: Frank Diggs


ATTENDING PHYSICIAN STATEMENT





I saw and evaluated the patient.


I reviewed the resident's note and discussed the case with the resident.


I agree with the resident's findings and plan as documented.








SUBJECTIVE:


Patient seen and examined in the ICU.  Awake and alert.  Minimal discomfort 

from the CT sites.  No abdominal pain. 


 Intake & Output











 03/24/17 03/25/17 03/26/17 03/27/17





 23:59 23:59 23:59 23:59


 


Intake Total 3050 2175 1520 300


 


Output Total 1609 1970 80 45


 


Balance 8492 813 0121 255


 


Weight 218 lb 222 lb 14.4 oz 219 lb 12.8 oz 222 lb 12.8 oz








 Last Vital Signs











Temp Pulse Resp BP Pulse Ox


 


 98.4 F   74   18   107/65   95 


 


 03/27/17 10:00  03/27/17 11:19  03/27/17 10:00  03/27/17 10:00  03/27/17 11:19








Active Medications





Acetaminophen (Tylenol -)  650 mg PO Q6H PRN


   PRN Reason: FEVER OR PAIN


   Last Admin: 03/26/17 20:46 Dose:  650 mg


Heparin Sodium (Porcine) (Heparin -)  5,000 unit SQ TID [facility]


   Last Admin: 03/27/17 05:06 Dose:  5,000 unit


Hydrochlorothiazide (Hctz -)  12.5 mg PO DAILY [facility]


   Last Admin: 03/27/17 10:01 Dose:  Not Given


Ertapenem 1 gm/ Sodium (Chloride)  50 mls @ 100 mls/hr IVPB DAILY [facility]


   Last Admin: 03/27/17 09:41 Dose:  100 mls/hr


Ipratropium Bromide (Atrovent 0.02% Nebulizer -)  1 amp NEB QIDR [facility]


   Last Admin: 03/27/17 11:19 Dose:  1 amp


Levothyroxine Sodium (Synthroid -)  125 mcg PO DAILY@0700 [facility]


   Last Admin: 03/27/17 06:10 Dose:  125 mcg


Lidocaine (Lidoderm Patch -)  1 patch TP DAILY [facility]


   Last Admin: 03/27/17 10:02 Dose:  1 patch


Losartan Potassium (Cozaar -)  50 mg PO DAILY [facility]


   Last Admin: 03/27/17 10:01 Dose:  Not Given


Ondansetron HCl (Zofran Injection)  4 mg IVPUSH Q6H PRN


   PRN Reason: NAUSEA AND/OR VOMITING


Oxycodone HCl (Roxicodone -)  5 mg PO Q4H PRN


   PRN Reason: PAIN


Oxycodone HCl (Roxicodone -)  10 mg PO Q4H PRN


   PRN Reason: PAIN


   Last Admin: 03/27/17 08:30 Dose:  10 mg


Polyethylene Glycol (Miralax (For Daily Use) -)  17 gm PO TID [facility]


   Last Admin: 03/27/17 05:06 Dose:  17 grams


Tramadol HCl (Ultram -)  50 mg PO Q6H [facility]


   Last Admin: 03/27/17 05:05 Dose:  50 mg








Exam:


General: awake, alert and cooperative


CV: S1, S2 no m/r/g


Pulm: CTA, chest tube right x2 serosang drainage no airleak


Abd: SNTND


Ext: WWP, trace edema


Neuro: CN grossly intact 





 


 Laboratory Results - last 24 hr











  03/27/17 03/27/17





  05:05 05:05


 


WBC  7.2 


 


RBC  3.35 L 


 


Hgb  9.7 L 


 


Hct  28.2 L 


 


MCV  84.4 


 


MCHC  34.3 


 


RDW  14.3 


 


Plt Count  411 


 


MPV  7.0 L 


 


Sodium   137


 


Potassium   3.7


 


Chloride   96 L


 


Carbon Dioxide   32


 


Anion Gap   9


 


BUN   7


 


Creatinine   0.5 L


 


Random Glucose   98


 


Calcium   8.1 L


 


Phosphorus   4.6


 


Magnesium   2.5 H














CXR: chest tubes on right side, overall unchanged





IMP: 


Pneumonia


R/O Parapneumonic Pleural Effusion


Possible subdiaphragmatic communication


(?) Mass 


Atelectasis


HTN


Hypothyroidism








Follow up pleural fluid analysis/path results


ABX coverage  as per ID


Incentive Spirometry 


PT, OOB to chair as tolerated


VTE prophylaxis 


Pain control 


Chest tube management per CTS


PO as tolerated 


Repeat CT to evaluate intra-abdominal process 


Daily CXR 








Dr Carbajal 


CCTime 35"

## 2017-03-27 NOTE — PN
Progress Note (short form)





- Note


Progress Note: 


POD#3





Pt without complaints.





 Vital Signs











 Period  Temp  Pulse  Resp  BP Sys/Olivares  Pulse Ox


 


 Last 24 Hr  97.6 F-99 F  68-94  14-86  /50-82  93-95








CT-anterior 35ml, serous


     lateral 90ml, serous





PE;


GEN: appears comfortable


CV:RRR


Lungs: Cta b/l anteriorly, No air leak in wither CT


ABD: soft, non-distended, non-tend





 CBC, BMP





 03/27/17 05:05 





 03/27/17 05:05 





CXR-?apical pntx





A/P: 57 yo female, POD#3 s/p Right VATS, pneumolysis, peural bx and drainage of 

effusion


   S/w Dr. Novak and CT to water seal


   cont oob to chair and incentive spirometer


   pain management

## 2017-03-27 NOTE — PATH
Surgical Pathology Report



Patient Name:  ISMAEL LAY

Accession #:  [id number]

[facility]. Rec. #:  [id number]                                                        

   /Age/Gender:  1960 (Age: 56) / F

Account:  [id number]                                                          

             Location: ICU 

Taken:  3/24/2017

Received:  3/24/2017

Reported:  3/27/2017

Physicians:  DANIAL Srinivasan M.D.

  



Specimen(s) Received

 RIGHT PLEURAL BIOPSY 





Clinical History

Pleural effusion

Empyema







Final Diagnosis

PLEURA, RIGHT, BIOPSY:

BENIGN FIBROCONNECTIVE FIBROFATTY TISSUE WITH MARKED ACTIVE AND CHRONIC

INFLAMMATION AND INFLAMED GRANULATION TISSUE CONSISTENT WITH ORGANIZING EMPYEMA.

NO DEFINITIVE GRANULOMATA IDENTIFIED.

NO MALIGNANCY IDENTIFIED IN THE EXAMINED MATERIAL.



Comment: Also refer to  for the cytology results.





***Electronically Signed***

Alexander Finkelstein, M.D.





Gross Description

Received in formalin labeled "right pleural biopsy" are 2 tan-brown, irregular

portions of soft tissue measuring 0.3 x 0.2 x 0.2 cm and 1.2 x 0.6 x 0.2 cm. The

larger portion is bisected and the specimen is entirely submitted in one

cassette.

DL/3/24/2017



saudi/3/24/2017

## 2017-03-27 NOTE — PN
Progress Note, Physician


Chief Complaint: 


ID








Day 4 post op





Remains stable NAD





Ertepenem continues











- Current Medication List


Current Medications: 


Active Medications





Acetaminophen (Tylenol -)  650 mg PO Q6H PRN


   PRN Reason: FEVER OR PAIN


   Last Admin: 03/26/17 20:46 Dose:  650 mg


Amlodipine Besylate (Norvasc -)  10 mg PO DAILY [facility]


Fentanyl (Sublimaze Injection -)  50 mcg IVPUSH [id number] PRN


   PRN Reason: PAIN


   Stop: 03/27/17 11:09


Heparin Sodium (Porcine) (Heparin -)  5,000 unit SQ TID [facility]


   Last Admin: 03/27/17 05:06 Dose:  5,000 unit


Hydrochlorothiazide (Hctz -)  12.5 mg PO DAILY [facility]


Ertapenem 1 gm/ Sodium (Chloride)  50 mls @ 100 mls/hr IVPB DAILY [facility]


Ipratropium Bromide (Atrovent 0.02% Nebulizer -)  1 amp NEB QIDR [facility]


   Last Admin: 03/27/17 06:52 Dose:  1 amp


Ketorolac Tromethamine (Toradol Injection -)  15 mg IVPUSH Q6H [facility]


   Stop: 03/27/17 08:59


   Last Admin: 03/27/17 04:59 Dose:  15 mg


Levothyroxine Sodium (Synthroid -)  125 mcg PO DAILY@0700 [facility]


   Last Admin: 03/27/17 06:10 Dose:  125 mcg


Lidocaine (Lidoderm Patch -)  1 patch TP DAILY [facility]


Losartan Potassium (Cozaar -)  50 mg PO DAILY [facility]


Ondansetron HCl (Zofran Injection)  4 mg IVPUSH Q6H PRN


   PRN Reason: NAUSEA AND/OR VOMITING


Oxycodone HCl (Roxicodone -)  5 mg PO Q4H PRN


   PRN Reason: PAIN


Oxycodone HCl (Roxicodone -)  10 mg PO Q4H PRN


   PRN Reason: PAIN


   Last Admin: 03/26/17 20:45 Dose:  10 mg


Polyethylene Glycol (Miralax (For Daily Use) -)  17 gm PO TID [facility]


   Last Admin: 03/27/17 05:06 Dose:  17 grams


Tramadol HCl (Ultram -)  50 mg PO Q6H [facility]


   Last Admin: 03/27/17 05:05 Dose:  50 mg











- Objective


Vital Signs: 


 Vital Signs











Temperature  99 F   03/27/17 06:00


 


Pulse Rate  79   03/27/17 06:00


 


Respiratory Rate  16   03/27/17 06:00


 


Blood Pressure  94/59   03/27/17 06:00


 


O2 Sat by Pulse Oximetry (%)  93 L  03/26/17 21:00











Constitutional: Yes: Well Nourished, No Distress


Neck: Yes: WNL, Supple


Cardiovascular: Yes: Regular Rate and Rhythm, S1, S2


Respiratory: Yes: WNL, Regular, CTA Bilaterally, Wheezes


Gastrointestinal: Yes: WNL, Normal Bowel Sounds, Soft.  No: Tenderness, 

Tenderness, Rebound


Edema: No


Labs: 


 CBC, BMP





 03/27/17 05:05 





 03/27/17 05:05 





 INR, PTT











INR  1.26  (0.82-1.09)  H  03/25/17  05:10    


 


Fibrinogen  512.0 mg/dL (238-498)  H  03/16/17  08:05    














Assessment/Plan


Microbiology





03/24/17 10:02   Biopsy Tissue   Gram Stain - Final


03/24/17 10:02   Biopsy Tissue   Anaerobic Culture - Final


                              NO GROWTH OF AEROBIC ORGANISMS AFTER 48 HOURS 

INCUBATION


                              NO ANAEROBES WERE ISOLATED


03/24/17 09:57   Tissue-Other   Gram Stain - Final


03/24/17 09:57   Tissue-Other   Anaerobic Culture - Final


                              NO GROWTH OF AEROBIC ORGANISMS AFTER 48 HOURS 

INCUBATION


                              NO ANAEROBES WERE ISOLATED


03/24/17 09:53   Tissue-Other   Gram Stain - Final


03/24/17 09:53   Tissue-Other   Anaerobic Culture - Final


                              NO ANAEROBES WERE ISOLATED


03/25/17 08:10   Blood - Peripheral Venous   TB Test (QFT) (MALI) - Preliminary


03/24/17 09:59   Bronchial Washings - Right Lower Lobe   GURPREET Preparation - 

Preliminary


03/24/17 09:59   Bronchial Washings - Right Lower Lobe   Fungal Culture - 

Preliminary





 Laboratory Tests











  03/21/17 03/26/17 03/26/17





  13:50 05:20 05:20


 


WBC   


 


Hgb   


 


Hct   


 


Plt Count   


 


C-Reactive Protein    5.4 H D


 


Pleural Fluid Source  Right pleural  


 


Pleural Color  Red  


 


Pleural Appearance  Hazy  


 


Pleural WBC  1,732  


 


Pleural RBC  21,022  


 


Pleural Neutrophils  8  


 


Pleural Lymphocytes  70  


 


Pleural Monocytes  3  


 


Pleural Eosinophils  1  


 


Pleural Basophils  1  


 


Pleural Macrophages  17  


 


Pleural Total Protein  5.244  


 


Pleural Glucose  71.845  


 


Histoplasma Antigen   Pending 


 


Urine Histoplasma Ag   














  03/26/17 03/27/17





  06:45 05:05


 


WBC   7.2


 


Hgb   9.7 L


 


Hct   28.2 L


 


Plt Count   411


 


C-Reactive Protein  


 


Pleural Fluid Source  


 


Pleural Color  


 


Pleural Appearance  


 


Pleural WBC  


 


Pleural RBC  


 


Pleural Neutrophils  


 


Pleural Lymphocytes  


 


Pleural Monocytes  


 


Pleural Eosinophils  


 


Pleural Basophils  


 


Pleural Macrophages  


 


Pleural Total Protein  


 


Pleural Glucose  


 


Histoplasma Antigen  


 


Urine Histoplasma Ag  Pending 








Assessment  Post VAT exudative pleural effusion negative cutures to date


                        Abnormal thickening of colon noted CT  ? impacted





Plan                Check pathology today


                        Dayna EASTMAN

## 2017-03-27 NOTE — PROC
Procedure Note


Procedure: 


POD#3





Pt without any CP/SOB. 





 Vital Signs











 Period  Temp  Pulse  Resp  BP Sys/Olivares  Pulse Ox


 


 Last 24 Hr  97.6 F-99 F  68-94  14-86  /50-82  93-95








CT-anterior, 35 ml, serous


     lateral, 90ml serous





PE:


GEN:appears comfortable


CV: RRR


Lungs: CTA b/l anteriorly


ABd: soft, non-distended, non-tender





 CBC, BMP





 03/27/17 05:05 





 03/27/17 05:05 





CXR-? right apical/lateral pntx 3/27





A/P:55 yo female s/p right VATS, pneumolysis with pleural bx, POD#3


   Continue CT to suction


   oob/incentive spirometer


   pain managment as needed

## 2017-03-28 LAB
ANION GAP SERPL CALC-SCNC: 11 MMOL/L (ref 8–16)
CALCIUM SERPL-MCNC: 8.4 MG/DL (ref 8.5–10.1)
CO2 SERPL-SCNC: 31 MMOL/L (ref 21–32)
CREAT SERPL-MCNC: 0.5 MG/DL (ref 0.55–1.02)
DEPRECATED RDW RBC AUTO: 14.6 % (ref 11.6–15.6)
GLUCOSE SERPL-MCNC: 91 MG/DL (ref 74–106)
MCH RBC QN AUTO: 28.9 PG (ref 25.7–33.7)
MCHC RBC AUTO-ENTMCNC: 33.9 G/DL (ref 32–36)
MCV RBC: 85.3 FL (ref 80–96)
PLATELET # BLD AUTO: 413 K/MM3 (ref 134–434)
PMV BLD: 7 FL (ref 7.5–11.1)
WBC # BLD AUTO: 6.8 K/MM3 (ref 4–10)

## 2017-03-28 RX ADMIN — POLYETHYLENE GLYCOL 3350 SCH GRAMS: 17 POWDER, FOR SOLUTION ORAL at 06:26

## 2017-03-28 RX ADMIN — HEPARIN SODIUM SCH UNIT: 5000 INJECTION, SOLUTION INTRAVENOUS; SUBCUTANEOUS at 14:49

## 2017-03-28 RX ADMIN — ERTAPENEM SODIUM SCH MLS/HR: 1 INJECTION, POWDER, LYOPHILIZED, FOR SOLUTION INTRAMUSCULAR; INTRAVENOUS at 09:41

## 2017-03-28 RX ADMIN — LEVOTHYROXINE SODIUM SCH MCG: 125 TABLET ORAL at 06:24

## 2017-03-28 RX ADMIN — POLYETHYLENE GLYCOL 3350 SCH GRAMS: 17 POWDER, FOR SOLUTION ORAL at 14:58

## 2017-03-28 RX ADMIN — IPRATROPIUM BROMIDE SCH AMP: 0.5 SOLUTION RESPIRATORY (INHALATION) at 06:06

## 2017-03-28 RX ADMIN — LOSARTAN POTASSIUM SCH MG: 50 TABLET, FILM COATED ORAL at 09:40

## 2017-03-28 RX ADMIN — SENNOSIDES SCH TAB: 8.6 TABLET, FILM COATED ORAL at 09:39

## 2017-03-28 RX ADMIN — POLYETHYLENE GLYCOL 3350 SCH GRAMS: 17 POWDER, FOR SOLUTION ORAL at 21:52

## 2017-03-28 RX ADMIN — LIDOCAINE SCH PATCH: 50 PATCH TOPICAL at 09:39

## 2017-03-28 RX ADMIN — IPRATROPIUM BROMIDE SCH AMP: 0.5 SOLUTION RESPIRATORY (INHALATION) at 11:07

## 2017-03-28 RX ADMIN — SENNOSIDES SCH TAB: 8.6 TABLET, FILM COATED ORAL at 21:49

## 2017-03-28 RX ADMIN — HEPARIN SODIUM SCH UNIT: 5000 INJECTION, SOLUTION INTRAVENOUS; SUBCUTANEOUS at 21:49

## 2017-03-28 RX ADMIN — HEPARIN SODIUM SCH UNIT: 5000 INJECTION, SOLUTION INTRAVENOUS; SUBCUTANEOUS at 06:23

## 2017-03-28 RX ADMIN — HYDROCHLOROTHIAZIDE SCH MG: 12.5 CAPSULE ORAL at 09:39

## 2017-03-28 NOTE — PN
Progress Note (short form)





- Note


Progress Note: 


1 chest tube removed today, the 2nd to be removed tomorrow


Repeat CT scan revealed enlarged perigastric fluid collection, phelgmonous 

process right flank and right colon appears normal with retained stool


On miralax TID. No BM x 3 days and receiving frequent PO opiate analgesia


Ms. Krishna confirmed that she did not have a gastric bypass but rather was 

told of a gastroplasty





Ordered Soap Suds Enema


Continue miralax 17g TID


Possible EGD/Colonoscopy thursday 3/30


Advised resident Madisyn to obtain previous endoscopic records from Dr. Haider

## 2017-03-28 NOTE — PN
Progress Note (short form)





- Note


Progress Note: 


PULMONARY





Still some right sided discomfort. One chest tube removed today. No fevers or 

chills.





 Last Vital Signs











Temp Pulse Resp BP Pulse Ox


 


 97.9 F   59 L  18   100/59   95 


 


 03/28/17 10:00  03/28/17 10:00  03/28/17 10:00  03/28/17 10:00  03/27/17 20:09











Gen:  NAD at rest


Heart:  RRR


Lung: decreased breath sounds at the bases


Abd: soft, nontender


Ext: no edema


Chest tube: serosanguinous drainage, no air leak





 CBC, BMP





 03/28/17 07:00 





 03/28/17 07:00 





Active Medications





Acetaminophen (Tylenol -)  650 mg PO Q6H PRN


   PRN Reason: FEVER OR PAIN


   Last Admin: 03/26/17 20:46 Dose:  650 mg


Heparin Sodium (Porcine) (Heparin -)  5,000 unit SQ TID [facility]


   Last Admin: 03/28/17 06:23 Dose:  5,000 unit


Hydrochlorothiazide (Hctz -)  12.5 mg PO DAILY [facility]


   Last Admin: 03/28/17 09:39 Dose:  12.5 mg


Ertapenem 1 gm/ Sodium (Chloride)  50 mls @ 100 mls/hr IVPB DAILY [facility]


   Last Admin: 03/28/17 09:41 Dose:  100 mls/hr


Ipratropium Bromide (Atrovent 0.02% Nebulizer -)  1 amp NEB QIDR [facility]


   Last Admin: 03/28/17 11:07 Dose:  1 amp


Levothyroxine Sodium (Synthroid -)  125 mcg PO DAILY@0700 [facility]


   Last Admin: 03/28/17 06:24 Dose:  125 mcg


Lidocaine (Lidoderm Patch -)  1 patch TP DAILY [facility]


   Last Admin: 03/28/17 09:39 Dose:  1 patch


Losartan Potassium (Cozaar -)  50 mg PO DAILY [facility]


   Last Admin: 03/28/17 09:40 Dose:  50 mg


Ondansetron HCl (Zofran Injection)  4 mg IVPUSH Q6H PRN


   PRN Reason: NAUSEA AND/OR VOMITING


Oxycodone HCl (Roxicodone -)  5 mg PO Q4H PRN


   PRN Reason: PAIN


   Last Admin: 03/28/17 03:00 Dose:  5 mg


Oxycodone HCl (Roxicodone -)  10 mg PO Q4H PRN


   PRN Reason: PAIN


   Last Admin: 03/27/17 16:02 Dose:  10 mg


Polyethylene Glycol (Miralax (For Daily Use) -)  17 gm PO TID [facility]


   Last Admin: 03/28/17 06:26 Dose:  17 grams


Senna (Senna -)  1 tab PO BID [facility]


   Last Admin: 03/28/17 09:39 Dose:  1 tab


Tramadol HCl (Ultram -)  50 mg PO Q6H [facility]


   Last Admin: 03/28/17 11:43 Dose:  50 mg














A/P


Intra-abdominal fluid collection


Exudative Pleural Effusion likely reactive


Atelectasis


HTN


Hypothyroidism





-  monitor CT output


-  continue antibiotics


-  monitor fever curve, WBC trend


-  GI work up in progress


-  pain control


-  DVT prophylaxis

## 2017-03-28 NOTE — PN
Teaching Attending Note


Name of Resident: Dirk Mars


ATTENDING PHYSICIAN STATEMENT





I saw and evaluated the patient.


I reviewed the resident's note and discussed the case with the resident.


I agree with the resident's findings and plan as documented.








SUBJECTIVE:


no fever ro chills, no abd apin , has pain in R chest wall at site of CT . 








OBJECTIVE:





NAD 


CV: RRR


Lungs: decreased breath sounds at R base with minimal crackles 


Ext : no edema on LE


Abd : soft, ND ,NT , NL BS 


 


 





ASSESSMENT AND PLAN:





Patient is a 55 y/o F w PMH of HTN, hypothyroidism presented to ER w/ R sided 

chest/abdominal pain x 2 days. Found to have R pleural effusion , Treated for 

CAP 





1- Exudative R pleural effusion: pleural bx  with organized empyema . supected 

etiology is connection through the diaphragm due to R makayla-hepatic phlegmon .





- C/A/P from yesterday reviewed. Still with makayla-hepatic changes and air /

contrast filled structure in retro-gastric area 


- one CT was removed today, plan to remove 2nd tomorrow 


- cont abx for  now. case d/w Dr. Novak, recs fro ABx x 1 more week  fro the 

perihepatic lesion 


- cont pain control 





2- Ascending colitis on CT scan: repeat scan showed resolution of thickened 

wall 


- cont ertapenem for now 


- cont miralax . given enema 


- colo and EGD , on Thursday 








3- Retro- gastric fluid collection including  contrast and air :  Dr. Claudio 

was called by Dr. Mars. Pt will be re-evaluated.





4-HTN : hold norvasc and place  holding parameters on HCTZ/losartan. 








Dispo : HLOC

## 2017-03-28 NOTE — PN
Physical Exam: 


SUBJECTIVE: Patient seen and examined at bedside


upset and tearful she is still hospitalized and still not sure as to the whole 

picture of what is going on 


moral support offered








OBJECTIVE:





 Vital Signs











 Period  Temp  Pulse  Resp  BP Sys/Olivares  Pulse Ox


 


 Last 24 Hr  97.9 F-99.2 F  59-99  18-18  100-137/59-82  95-96











GENERAL: The patient is awake, alert, and fully oriented, in no acute distress. 


HEAD: Normal with no signs of trauma. 


NECK:  supple. 


LUNGS:  diminished breath sounds on the right with crackles. left side is 

clear. 2 chest tubes in places with serous drainage no air leak 


HEART: Regular rate and rhythm, S1, S2 without murmur, rub or gallop.


ABDOMEN: ABD soft non tender non distended 


NEUROLOGICAL: Cranial nerves II through XII grossly intact. Normal speech 


PSYCH: tearful














 Laboratory Results - last 24 hr











  03/25/17 03/28/17 03/28/17





  05:10 07:00 07:00


 


WBC   6.8 


 


RBC   3.23 L 


 


Hgb   9.4 L 


 


Hct   27.6 L 


 


MCV   85.3 


 


MCHC   33.9 


 


RDW   14.6 


 


Plt Count   413 


 


MPV   7.0 L 


 


Sodium    139


 


Potassium    4.0


 


Chloride    97 L


 


Carbon Dioxide    31


 


Anion Gap    11


 


BUN    5 L D


 


Creatinine    0.5 L


 


Random Glucose    91


 


Calcium    8.4 L


 


JAMA Screen  Positive H  


 


JAMA Homogeneous Pattern  1:160 H  


 


JAMA Nucleolar Pattern  TNP  


 


JAMA Speckled Pattern  TNP  


 


JAMA Centromere Pattern  TNP  


 


Hep A IgM Ab Confirm  Negative  


 


Hepatitis A Ab Total  Positive H  


 


Hep Bs Antigen  Negative  


 


Hep Bs Antibody  Non reactive  


 


Hep B Core Total Ab  Negative  








Active Medications











Generic Name Dose Route Start Last Admin





  Trade Name Krissy  PRN Reason Stop Dose Admin


 


Acetaminophen  650 mg 03/26/17 17:35 03/26/17 20:46





  Tylenol -  PO   650 mg





  Q6H PRN   Administration





  FEVER OR PAIN   


 


Heparin Sodium (Porcine)  5,000 unit 03/26/17 22:00 03/28/17 14:49





  Heparin -  SQ   5,000 unit





  TID ROBIN   Administration


 


Hydrochlorothiazide  12.5 mg 03/27/17 10:00 03/28/17 09:39





  Hctz -  PO   12.5 mg





  DAILY ROBIN   Administration


 


Ertapenem 1 gm/ Sodium  50 mls @ 100 mls/hr 03/27/17 10:00 03/28/17 09:41





  Chloride  IVPB   100 mls/hr





  DAILY ROBIN   Administration


 


Ipratropium Bromide  1 amp 03/28/17 12:37  





  Atrovent 0.02% Nebulizer -  NEB   





  QIDR PRN   





  SHORT OF BREATH/WHEEZING   


 


Levothyroxine Sodium  125 mcg 03/27/17 07:00 03/28/17 06:24





  Synthroid -  PO   125 mcg





  DAILY@0700 ROBNI   Administration


 


Lidocaine  1 patch 03/27/17 10:00 03/28/17 09:39





  Lidoderm Patch -  TP   1 patch





  DAILY ROBIN   Administration


 


Losartan Potassium  50 mg 03/27/17 10:00 03/28/17 09:40





  Cozaar -  PO   50 mg





  DAILY ROBIN   Administration


 


Ondansetron HCl  4 mg 03/26/17 17:35  





  Zofran Injection  IVPUSH   





  Q6H PRN   





  NAUSEA AND/OR VOMITING   


 


Oxycodone HCl  5 mg 03/26/17 17:35 03/28/17 03:00





  Roxicodone -  PO   5 mg





  Q4H PRN   Administration





  PAIN   


 


Oxycodone HCl  10 mg 03/26/17 17:35 03/27/17 16:02





  Roxicodone -  PO   10 mg





  Q4H PRN   Administration





  PAIN   


 


Polyethylene Glycol  17 gm 03/26/17 22:00 03/28/17 14:58





  Miralax (For Daily Use) -  PO   17 grams





  TID ROBIN   Administration


 


Senna  1 tab 03/27/17 22:00 03/28/17 09:39





  Senna -  PO   1 tab





  BID ROBIN   Administration


 


Tramadol HCl  50 mg 03/26/17 18:00 03/28/17 11:43





  Ultram -  PO   50 mg





  Q6H ROBIN   Administration











ASSESSMENT/PLAN:


55 y/o F w/PMH of HTN, hypothyroidism presented to ER w/ R sided chest/

abdominal pain x 2 days. Found to have R pleural effusion. she is now POD#4 s/p 

 bronchoscopy, Right VATS, pneumolysis, pleural biopsy, drainage of pleural 

effusion, insertion of chest tubex2





Right pleural effusion/Community acquired pneumonia/Sepsis (leukocytosis and 

fever): community acquired pneumonia with parapneumonic effusion with pleuritic 

chest pain. at this point i do not think the patient had a pneumonia as her 

chest reaccumulated with pleural effusions so quickly with antibiotics. 

pleureal effusions likely reactive to mass that is extra thoracic close to the 

liver possibly a dropped stone 


s/p thoracentesis x2


patient had reaccumulation of fluid pulmonary requested repeat thoracentesis 

and second one was done 3/21/17


ID consult appreciated 


Azithromycin and Ceftriaxone stopped on day 5 


continue ertapenem day 9 given possible stone dropped in abdomen during 

cholecystectomy 


pleural fluid studies-shows inital thoracentesis was exudative likely 

parapneuomonic effusion-cytology negative-cytology shows inflammatory cells 


Afebrile 


f/u pleural fluid studies is not revealing any infectious source 


pulmonary consult appreciated   


CT chest results noted 


Chest tube to waterseal per CT surgeryto remove one chest tube today another 

one tomorrow  


pain control is very important for this patient so she could take deep breaths 

and does not splint. as well as incentive spirometry. this was explained to the 

patient in detail.  


WIll send complement Anti SS and Anti DS DNA-pending 


Will repeat CT Chest ABD/Pelv today 


Pleural fluid exudative-per pathology report possible organizing empyema 





abdominal wall mass: this was seen on chest CT also read as a mass on abdominal 

CT after reviewing the scans with radiology they do not feel there is a mass in 

the liver. it is likely part of the abdominal wall vs a dropped stone from her 

cholecystectomy 


LFTs WNL 


will see if right flank/chest/abdominal pain improves with treatment of 

pneumonia-no significant improvement 


Repeat Abdominal CT noted


GI consult 


aspirated on 3/21/17 by IR- pathology report inflammatory cells 


Surgery consult appreciated-may need surgical drainage 





Ascending colitis seen on CT scan: Patient now with diarrhea 


infectious vs inflammatory


GI consult appreciated-possible Colonoscopy and EGD on this admission possible 

this thursday or friday 


Surgery consult appreciated-agrees with GI plan 


Continue ertapenem 


Stool studies and C diff-pending 


 





Mass posterior to stomach:


new mass seen on CT scan that was not there on prior CT on this admission


GI consult appreciated possible colonoscopy/EGD on this admission next week 


on repeat CT scan of the abdomen it appears this mass has increased in size-

being read as possible contained leak. Dr. Chilel contacted will see patient 

tonight-requesting EGD/colonoscopy be done first 








HTN-patient is currently well controlled  


Hold Norvasc for now 


continue  Losartan and HCTZ with hold parameters 





Hypothyroidism


Synthroid 125mcg qam 





-FEN


no IVF 


no electrolyte issues  


low sodium diet  





PPx:


HSQ/SCDs


No criteria met for GI PPx


PT consult to avoid deconditioning as she had a major surgery 





Lab holiday tomorrow AM 





Visit type





- Emergency Visit


Emergency Visit: Yes


ED Registration Date: 03/15/17


Care time: The patient presented to the Emergency Department on the above date 

and was hospitalized for further evaluation of their emergent condition.





- New Patient


This patient is new to me today: No





- Critical Care


Critical Care patient: No

## 2017-03-29 RX ADMIN — SENNOSIDES SCH TAB: 8.6 TABLET, FILM COATED ORAL at 21:50

## 2017-03-29 RX ADMIN — HEPARIN SODIUM SCH UNIT: 5000 INJECTION, SOLUTION INTRAVENOUS; SUBCUTANEOUS at 06:43

## 2017-03-29 RX ADMIN — POLYETHYLENE GLYCOL 3350 SCH GRAMS: 17 POWDER, FOR SOLUTION ORAL at 06:43

## 2017-03-29 RX ADMIN — HEPARIN SODIUM SCH UNIT: 5000 INJECTION, SOLUTION INTRAVENOUS; SUBCUTANEOUS at 14:25

## 2017-03-29 RX ADMIN — POLYETHYLENE GLYCOL 3350 SCH GRAMS: 17 POWDER, FOR SOLUTION ORAL at 14:26

## 2017-03-29 RX ADMIN — LOSARTAN POTASSIUM SCH MG: 50 TABLET, FILM COATED ORAL at 10:11

## 2017-03-29 RX ADMIN — SENNOSIDES SCH TAB: 8.6 TABLET, FILM COATED ORAL at 10:10

## 2017-03-29 RX ADMIN — POLYETHYLENE GLYCOL 3350 SCH: 17 POWDER, FOR SOLUTION ORAL at 21:49

## 2017-03-29 RX ADMIN — HYDROCHLOROTHIAZIDE SCH MG: 12.5 CAPSULE ORAL at 10:10

## 2017-03-29 RX ADMIN — ERTAPENEM SODIUM SCH MLS/HR: 1 INJECTION, POWDER, LYOPHILIZED, FOR SOLUTION INTRAMUSCULAR; INTRAVENOUS at 10:10

## 2017-03-29 RX ADMIN — LIDOCAINE SCH PATCH: 50 PATCH TOPICAL at 10:12

## 2017-03-29 RX ADMIN — LEVOTHYROXINE SODIUM SCH MCG: 125 TABLET ORAL at 06:45

## 2017-03-29 NOTE — PN
GI Progress Note


Subjective: 


GI NOte; Finally had 2 BMs yesterday. Denies abdominal pain. Anticipating 

removal of remaining CT today. Biopsies and aspirations are nondiagnostic so I 

have advised proceeding with EGD and colonoscopy tomorrow. I have informed 

Kelsi of the need for a bowel prep. I informed her of the potential for 

perforation and hemorrhage among other complications and the potential need for 

surgery or transfusions if complications arise. She has signed informed 

consents for both. 





- Objective


Vital Signs: 


 Vital Signs











Temperature  98.9 F   03/29/17 06:00


 


Pulse Rate  92 H  03/29/17 06:00


 


Respiratory Rate  18   03/29/17 06:00


 


Blood Pressure  121/65   03/29/17 06:00


 


O2 Sat by Pulse Oximetry (%)  92 L  03/28/17 21:00








CBC,CMP











WBC  6.8 K/mm3 (4.0-10.0)   03/28/17  07:00    


 


Corrected WBC (auto)  Cancelled   03/15/17  18:02    


 


RBC  3.23 M/mm3 (3.60-5.2)  L  03/28/17  07:00    


 


Hgb  9.4 GM/dL (10.7-15.3)  L  03/28/17  07:00    


 


Hct  27.6 % (32.4-45.2)  L  03/28/17  07:00    


 


MCV  85.3 fl (80-96)   03/28/17  07:00    


 


MCHC  33.9 g/dl (32.0-36.0)   03/28/17  07:00    


 


RDW  14.6 % (11.6-15.6)   03/28/17  07:00    


 


Plt Count  413 K/MM3 (134-434)   03/28/17  07:00    


 


MPV  7.0 fl (7.5-11.1)  L  03/28/17  07:00    


 


Neutrophils %  81.5 % (42.8-82.8)   03/25/17  05:10    


 


Lymphocytes %  9.5 % (8-40)  D 03/25/17  05:10    


 


Monocytes %  8.5 % (3.8-10.2)   03/25/17  05:10    


 


Eosinophils %  0.1 % (0-4.5)  D 03/25/17  05:10    


 


Basophils %  0.4 % (0-2.0)   03/25/17  05:10    


 


Differential Comment  Manual diff done   03/23/17  06:40    


 


Smudge Cells  Cancelled   03/15/17  18:02    


 


Platelet Estimate  Slt increased  (NORMAL)   03/23/17  06:40    


 


Platelet Comment  No clumping noted   03/15/17  22:50    


 


Platelet Comment  No clotting detected   03/15/17  22:50    


 


RBC Morphology     03/15/17  22:50    


 


ESR  15 mm/hr (0-30)   03/17/17  07:00    


 


Sodium  139 mmol/L (136-145)   03/28/17  07:00    


 


Potassium  4.0 mmol/L (3.5-5.1)   03/28/17  07:00    


 


Chloride  97 mmol/L ()  L  03/28/17  07:00    


 


Carbon Dioxide  31 mmol/L (21-32)   03/28/17  07:00    


 


Anion Gap  11  (8-16)   03/28/17  07:00    


 


BUN  5 mg/dL (7-18)  L D 03/28/17  07:00    


 


Creatinine  0.5 mg/dL (0.55-1.02)  L  03/28/17  07:00    


 


Creat Clearance w eGFR  > 60  (>60)   03/26/17  05:20    


 


Random Glucose  91 mg/dL ()   03/28/17  07:00    


 


Lactic Acid  1.306 mmol/L (0.4-2.0)   03/15/17  18:02    


 


Calcium  8.4 mg/dL (8.5-10.1)  L  03/28/17  07:00    


 


Phosphorus  4.6 mg/dL (2.5-4.9)   03/27/17  05:05    


 


Magnesium  2.5 mg/dL (1.8-2.4)  H  03/27/17  05:05    


 


Iron  36 ug/dL ()   03/24/17  06:10    


 


TIBC  198 ug/dL (250-450)  L  03/24/17  06:10    


 


Iron Saturation  18 % (15-55)   03/24/17  06:10    


 


Ferritin  143.379 ng/ml (6.9-282.5)   03/25/17  05:10    


 


Total Bilirubin  0.4 mg/dL (0.2-1.0)  D 03/26/17  05:20    


 


Direct Bilirubin  0.1 mg/dL (0.0-0.2)   03/25/17  05:10    


 


AST  42 U/L (15-37)  H D 03/26/17  05:20    


 


ALT  60 U/L (12-78)  D 03/26/17  05:20    


 


Alkaline Phosphatase  98 U/L ()   03/26/17  05:20    


 


LD Total  150 U/L ()   03/25/17  05:10    


 


Creatine Kinase  41 IU/L ()   03/15/17  18:02    


 


Troponin I  < 0.02 ng/ml (0.00-0.05)   03/15/17  18:02    


 


C-Reactive Protein  5.4 MG/DL (0.00-0.3)  H D 03/26/17  05:20    


 


Total Protein  6.0 g/dl (6.4-8.2)  L  03/26/17  05:20    


 


Albumin  2.3 g/dl (3.4-5.0)  L  03/26/17  05:20    


 


Tumor Marker AFP  1.9 ng/ml (0.0-8.3)   03/25/17  05:10    


 


Carcinoembryonic Ag  1.0 ng/mL (0.0-4.7)   03/25/17  05:10    











Constitutional: Calm


...Auscultate: Yes: Normoactive Bowel Sounds


...Palpate: Yes: Soft, Other (nontender)


Labs: 


 CBC, BMP





 03/28/17 07:00 





 03/28/17 07:00 





 INR, PTT











INR  1.26  (0.82-1.09)  H  03/25/17  05:10    


 


Fibrinogen  512.0 mg/dL (238-498)  H  03/16/17  08:05    














Assessment/Plan


Perihepatic and postgastric inflammatory vs malignant processes with 

penetration into right pleural space. Doubt a primary GI process but need to 

exclude. A residual dropped gallstone does fit. Will proceed with prep for EGD 

and colonoscopy tomorrow. Need to start the prep process even if procedures 

need to be postponed to Friday.

## 2017-03-29 NOTE — PN
Progress Note, Physician


History of Present Illness: 


PULMONARY





ALERT,OOB-CHAIR,-SOB.MINIMAL CHEST TUBE DRAINAGE





- Current Medication List


Current Medications: 


Active Medications





Acetaminophen (Tylenol -)  650 mg PO Q6H PRN


   PRN Reason: FEVER OR PAIN


Bisacodyl (Dulcolax -)  20 mg PO ONCE ONE


   Stop: 03/29/17 20:01


Heparin Sodium (Porcine) (Heparin -)  5,000 unit SQ TID [facility]


   Last Admin: 03/29/17 06:43 Dose:  5,000 unit


Hydrochlorothiazide (Hctz -)  12.5 mg PO DAILY [facility]


   Last Admin: 03/29/17 10:10 Dose:  12.5 mg


Ertapenem 1 gm/ Sodium (Chloride)  50 mls @ 100 mls/hr IVPB DAILY [facility]


   Last Admin: 03/29/17 10:10 Dose:  100 mls/hr


Ipratropium Bromide (Atrovent 0.02% Nebulizer -)  1 amp NEB QIDR PRN


   PRN Reason: SHORT OF BREATH/WHEEZING


Levothyroxine Sodium (Synthroid -)  125 mcg PO DAILY@0700 [facility]


   Last Admin: 03/29/17 06:45 Dose:  125 mcg


Lidocaine (Lidoderm Patch -)  1 patch TP DAILY [facility]


   Last Admin: 03/29/17 10:12 Dose:  1 patch


Losartan Potassium (Cozaar -)  50 mg PO DAILY [facility]


   Last Admin: 03/29/17 10:11 Dose:  50 mg


Ondansetron HCl (Zofran Injection)  4 mg IVPUSH Q6H PRN


   PRN Reason: NAUSEA AND/OR VOMITING


Oxycodone HCl (Roxicodone -)  5 mg PO Q4H PRN


   PRN Reason: PAIN


Oxycodone HCl (Roxicodone -)  10 mg PO Q4H PRN


   PRN Reason: PAIN


Polyethylene Glycol (Miralax (For Daily Use) -)  17 gm PO TID [facility]


   Last Admin: 03/29/17 06:43 Dose:  17 grams


Senna (Senna -)  1 tab PO BID [facility]


   Last Admin: 03/29/17 10:10 Dose:  1 tab


Tramadol HCl (Ultram -)  50 mg PO Q6HPO [facility]


   Last Admin: 03/29/17 11:45 Dose:  50 mg











- Objective


Vital Signs: 


 Vital Signs











Temperature  98.2 F   03/29/17 13:29


 


Pulse Rate  100 H  03/29/17 13:29


 


Respiratory Rate  16   03/29/17 13:29


 


Blood Pressure  105/72   03/29/17 13:29


 


O2 Sat by Pulse Oximetry (%)  92 L  03/28/17 21:00











Constitutional: Yes: Well Nourished, Calm


Eyes: Yes: WNL


HENT: Yes: WNL


Neck: Yes: WNL


Cardiovascular: Yes: Regular Rate and Rhythm, S1, S2


Respiratory: Yes: Diminished (R BASE)


Gastrointestinal: Yes: WNL


Extremities: Yes: WNL


Edema: No


Labs: 


 CBC, BMP





 03/28/17 07:00 





 03/28/17 07:00 





 INR, PTT











INR  1.26  (0.82-1.09)  H  03/25/17  05:10    


 


Fibrinogen  512.0 mg/dL (238-498)  H  03/16/17  08:05    














Assessment/Plan


A/P


S/P R Vat


Intra-abdominal fluid collection


Exudative Pleural Effusion likely reactive


Atelectasis


HTN


Hypothyroidism





-  monitor CT output


-  continue antibiotics


-  GI work up in progress


-  pain control


-  DVT prophylaxis


- check path


 - chest tube as per thoracic surgery


- egd and colonoscopy in am








DR CALI

## 2017-03-29 NOTE — PN
Physical Exam: 


SUBJECTIVE: Patient seen and examined at bedside


not as down and depressed today but still frustrated that she is still in the 

hospital and feels as if things are not moving along fast enough








OBJECTIVE:





 Vital Signs











 Period  Temp  Pulse  Resp  BP Sys/Olivares  Pulse Ox


 


 Last 24 Hr  97.9 F-99.8 F    16-18  105-124/51-72  92











GENERAL: The patient is awake, alert, and fully oriented, in no acute distress. 


HEAD: Normal with no signs of trauma. 


NECK:  supple. 


LUNGS:  diminished breath sounds on the right with crackles. left side is 

clear. 1 chest tubes in place with serous drainage no air leak 


HEART: Regular rate and rhythm, S1, S2 without murmur, rub or gallop.


ABDOMEN: ABD soft non tender non distended 


NEUROLOGICAL: Cranial nerves II through XII grossly intact. Normal speech 


PSYCH: tearful

















 Laboratory Results - last 24 hr











  03/23/17 03/26/17 03/28/17





  06:40 06:45 07:00


 


Double Strand DNA Ab    1


 


Tot Complement (CH50)    52


 


Urine Histoplasma Ag   0.11 


 


Crossmatch IS Only  See Detail  








Active Medications











Generic Name Dose Route Start Last Admin





  Trade Name Freq  PRN Reason Stop Dose Admin


 


Acetaminophen  650 mg 03/29/17 02:33  





  Tylenol -  PO   





  Q6H PRN   





  FEVER OR PAIN   


 


Bisacodyl  20 mg 03/29/17 20:00  





  Dulcolax -  PO 03/29/17 20:01  





  ONCE ONE   


 


Heparin Sodium (Porcine)  5,000 unit 03/29/17 06:00 03/29/17 14:25





  Heparin -  SQ   5,000 unit





  TID ROBIN   Administration


 


Hydrochlorothiazide  12.5 mg 03/29/17 10:00 03/29/17 10:10





  Hctz -  PO   12.5 mg





  DAILY ROBIN   Administration


 


Ertapenem 1 gm/ Sodium  50 mls @ 100 mls/hr 03/29/17 10:00 03/29/17 10:10





  Chloride  IVPB   100 mls/hr





  DAILY ROBIN   Administration


 


Ipratropium Bromide  1 amp 03/28/17 12:37  





  Atrovent 0.02% Nebulizer -  NEB   





  QIDR PRN   





  SHORT OF BREATH/WHEEZING   


 


Levothyroxine Sodium  125 mcg 03/29/17 07:00 03/29/17 06:45





  Synthroid -  PO   125 mcg





  DAILY@0700 ROBIN   Administration


 


Lidocaine  1 patch 03/29/17 10:00 03/29/17 10:12





  Lidoderm Patch -  TP   1 patch





  DAILY ROBIN   Administration


 


Losartan Potassium  50 mg 03/29/17 10:00 03/29/17 10:11





  Cozaar -  PO   50 mg





  DAILY ROBIN   Administration


 


Ondansetron HCl  4 mg 03/29/17 02:33  





  Zofran Injection  IVPUSH   





  Q6H PRN   





  NAUSEA AND/OR VOMITING   


 


Oxycodone HCl  5 mg 03/29/17 02:33  





  Roxicodone -  PO   





  Q4H PRN   





  PAIN   


 


Oxycodone HCl  10 mg 03/29/17 02:33  





  Roxicodone -  PO   





  Q4H PRN   





  PAIN   


 


Polyethylene Glycol  17 gm 03/29/17 06:00 03/29/17 14:26





  Miralax (For Daily Use) -  PO   17 grams





  TID ROBIN   Administration


 


Senna  1 tab 03/29/17 10:00 03/29/17 10:10





  Senna -  PO   1 tab





  BID ROBIN   Administration


 


Tramadol HCl  50 mg 03/29/17 06:00 03/29/17 11:45





  Ultram -  PO   50 mg





  Q6HPO ROBIN   Administration











ASSESSMENT/PLAN:


57 y/o F w/PMH of HTN, hypothyroidism presented to ER w/ R sided chest/

abdominal pain x 2 days. Found to have R pleural effusion. she is now POD#5 s/p 

 bronchoscopy, Right VATS, pneumolysis, pleural biopsy, drainage of pleural 

effusion, insertion of chest tubex2





Right pleural effusion/Community acquired pneumonia/Sepsis (leukocytosis and 

fever): community acquired pneumonia with parapneumonic effusion with pleuritic 

chest pain. at this point i do not think the patient had a pneumonia as her 

chest reaccumulated with pleural effusions so quickly with antibiotics. 

pleureal effusions likely reactive to mass that is extra thoracic close to the 

liver possibly a dropped stone 


s/p thoracentesis x2


patient had reaccumulation of fluid pulmonary requested repeat thoracentesis 

and second one was done 3/21/17


ID consult appreciated 


Azithromycin and Ceftriaxone stopped on day 5 


continue ertapenem day 10 given possible stone dropped in abdomen during 

cholecystectomy-Per ID 


pleural fluid studies-shows initial thoracentesis was exudative likely 

parapneuomonic effusion-cytology negative-cytology shows inflammatory cells 


Afebrile 


f/u pleural fluid studies is not revealing any infectious source 


pulmonary consult appreciated   


CT chest results noted 


Chest tube to waterseal per CT surgery to remove chest tube today 


pain control is very important for this patient so she could take deep breaths 

and does not splint. as well as incentive spirometry. this was explained to the 

patient in detail.  


 complement Anti SS and Anti DS DNA-pending 


Will repeat CT Chest ABD/Pelv today 


Pleural fluid exudative-per pathology report possible organizing empyema 





abdominal wall mass: this was seen on chest CT also read as a mass on abdominal 

CT after reviewing the scans with radiology they do not feel there is a mass in 

the liver. it is likely part of the abdominal wall vs a dropped stone from her 

cholecystectomy 


LFTs WNL 


will see if right flank/chest/abdominal pain improves with treatment of 

pneumonia-no significant improvement 


Repeat Abdominal CT noted


GI consult 


aspirated on 3/21/17 by IR- pathology report inflammatory cells 


Surgery consult appreciated-may need surgical drainage 





Ascending colitis seen on CT scan: Patient now with diarrhea 


infectious vs inflammatory


GI consult appreciated-possible Colonoscopy and EGD on this admission possible 

this thursday or friday 


Continue ertapenem 


Stool studies and C diff-pending 


 





Mass posterior to stomach:


new mass seen on CT scan that was not there on prior CT on this admission


GI consult appreciated colonoscopy/EGD tomorrow  


on repeat CT scan of the abdomen it appears this mass has increased in size-

being read as possible contained leak. Dr. Chilel contacted did not see 

patient yesterday will consult Dr. Jessica Reyna still did not see the patient since this weekend and has not given 

us a plan for this fluid accumulation behind stomach will consult Dr. Lopez 








HTN-patient is currently well controlled  


Hold Norvasc for now 


continue  Losartan and HCTZ with hold parameters 





Hypothyroidism


Synthroid 125mcg qam 





-FEN


no IVF 


no electrolyte issues  


CLD 





PPx:


HSQ/SCDs


No criteria met for GI PPx


PT consult to avoid deconditioning as she had a major surgery 





Lab holiday tomorrow AM 





Visit type





- Emergency Visit


Emergency Visit: Yes


ED Registration Date: 03/15/17


Care time: The patient presented to the Emergency Department on the above date 

and was hospitalized for further evaluation of their emergent condition.





- New Patient


This patient is new to me today: No





- Critical Care


Critical Care patient: No

## 2017-03-29 NOTE — PN
Progress Note (short form)





- Note


Progress Note: 


POD#5





Pt without complaints of CP/SOB. Taking golytely for colonoscopy tomorrow. 





 Vital Signs











 Period  Temp  Pulse  Resp  BP Sys/Olivares  Pulse Ox


 


 Last 24 Hr  97.9 F-98.9 F    16-18  105-124/51-72  92-99








PE:


GEN: appears comfortable


CV: RRR


Lungs: CTA b/l, CT drainage 50ml, no airleak. CT removed without difficulty and 

dry sterile dressing applied.


ABD: soft, non-distended, non-tender





 CBC, BMP





 03/28/17 07:00 





 03/28/17 07:00 








CXR- atlectasis at right base, CT in place 3/29





A/P: s/p Right Vats with pneumolysis, pleural bx, and drainage of pleural 

effusion


   spoke with Dr. Novak and chest tube was removed, cxr ordered for post CT 

removal


   Continue golytely for colonscopy

## 2017-03-29 NOTE — PN
Teaching Attending Note


Name of Resident: Dirk Mars


ATTENDING PHYSICIAN STATEMENT





I saw and evaluated the patient.


I reviewed the resident's note and discussed the case with the resident.


I agree with the resident's findings and plan as documented.


Patient is feeling better, with no fever or chills.


 Vital Signs











Temperature  98.8 F   03/29/17 18:39


 


Pulse Rate  84   03/29/17 18:39


 


Respiratory Rate  18   03/29/17 18:39


 


Blood Pressure  107/64   03/29/17 18:39


 


O2 Sat by Pulse Oximetry (%)  99   03/29/17 09:00








 CBCD











WBC  6.8 K/mm3 (4.0-10.0)   03/28/17  07:00    


 


RBC  3.23 M/mm3 (3.60-5.2)  L  03/28/17  07:00    


 


Hgb  9.4 GM/dL (10.7-15.3)  L  03/28/17  07:00    


 


Hct  27.6 % (32.4-45.2)  L  03/28/17  07:00    


 


MCV  85.3 fl (80-96)   03/28/17  07:00    


 


MCHC  33.9 g/dl (32.0-36.0)   03/28/17  07:00    


 


RDW  14.6 % (11.6-15.6)   03/28/17  07:00    


 


Plt Count  413 K/MM3 (134-434)   03/28/17  07:00    


 


MPV  7.0 fl (7.5-11.1)  L  03/28/17  07:00    








 CMP











Sodium  139 mmol/L (136-145)   03/28/17  07:00    


 


Potassium  4.0 mmol/L (3.5-5.1)   03/28/17  07:00    


 


Chloride  97 mmol/L ()  L  03/28/17  07:00    


 


Carbon Dioxide  31 mmol/L (21-32)   03/28/17  07:00    


 


Anion Gap  11  (8-16)   03/28/17  07:00    


 


BUN  5 mg/dL (7-18)  L D 03/28/17  07:00    


 


Creatinine  0.5 mg/dL (0.55-1.02)  L  03/28/17  07:00    


 


Creat Clearance w eGFR  > 60  (>60)   03/26/17  05:20    


 


Random Glucose  91 mg/dL ()   03/28/17  07:00    


 


Calcium  8.4 mg/dL (8.5-10.1)  L  03/28/17  07:00    


 


Total Bilirubin  0.4 mg/dL (0.2-1.0)  D 03/26/17  05:20    


 


AST  42 U/L (15-37)  H D 03/26/17  05:20    


 


ALT  60 U/L (12-78)  D 03/26/17  05:20    


 


Alkaline Phosphatase  98 U/L ()   03/26/17  05:20    


 


Total Protein  6.0 g/dl (6.4-8.2)  L  03/26/17  05:20    


 


Albumin  2.3 g/dl (3.4-5.0)  L  03/26/17  05:20    








 CARDIAC ENZYMES











Creatine Kinase  41 IU/L ()   03/15/17  18:02    


 


Troponin I  < 0.02 ng/ml (0.00-0.05)   03/15/17  18:02    








 Current Medications











Generic Name Dose Route Start Last Admin





  Trade Name Freq  PRN Reason Stop Dose Admin


 


Acetaminophen  650 mg 03/29/17 02:33  





  Tylenol -  PO   





  Q6H PRN   





  FEVER OR PAIN   


 


Bisacodyl  20 mg 03/29/17 20:00  





  Dulcolax -  PO 03/29/17 20:01  





  ONCE ONE   


 


Heparin Sodium (Porcine)  5,000 unit 03/29/17 06:00 03/29/17 14:25





  Heparin -  SQ   5,000 unit





  TID ROBIN   Administration


 


Hydrochlorothiazide  12.5 mg 03/29/17 10:00 03/29/17 10:10





  Hctz -  PO   12.5 mg





  DAILY ROBIN   Administration


 


Ertapenem 1 gm/ Sodium  50 mls @ 100 mls/hr 03/29/17 10:00 03/29/17 10:10





  Chloride  IVPB   100 mls/hr





  DAILY ROBIN   Administration


 


Ipratropium Bromide  1 amp 03/28/17 12:37  





  Atrovent 0.02% Nebulizer -  NEB   





  QIDR PRN   





  SHORT OF BREATH/WHEEZING   


 


Levothyroxine Sodium  125 mcg 03/29/17 07:00 03/29/17 06:45





  Synthroid -  PO   125 mcg





  DAILY@0700 ROBIN   Administration


 


Lidocaine  1 patch 03/29/17 10:00 03/29/17 10:12





  Lidoderm Patch -  TP   1 patch





  DAILY ROBIN   Administration


 


Losartan Potassium  50 mg 03/29/17 10:00 03/29/17 10:11





  Cozaar -  PO   50 mg





  DAILY ROBIN   Administration


 


Ondansetron HCl  4 mg 03/29/17 02:33  





  Zofran Injection  IVPUSH   





  Q6H PRN   





  NAUSEA AND/OR VOMITING   


 


Oxycodone HCl  5 mg 03/29/17 02:33  





  Roxicodone -  PO   





  Q4H PRN   





  PAIN   


 


Oxycodone HCl  10 mg 03/29/17 02:33  





  Roxicodone -  PO   





  Q4H PRN   





  PAIN   


 


Polyethylene Glycol  17 gm 03/29/17 06:00 03/29/17 14:26





  Miralax (For Daily Use) -  PO   17 grams





  TID ROBIN   Administration


 


Senna  1 tab 03/29/17 10:00 03/29/17 10:10





  Senna -  PO   1 tab





  BID ROBIN   Administration


 


Tramadol HCl  50 mg 03/29/17 06:00 03/29/17 17:16





  Ultram -  PO   50 mg





  Q6HPO ROBIN   Administration




















ASSESSMENT AND PLAN:


Patient is a 57 y/o F w PMH of HTN, hypothyroidism presented to ER w/ R sided 

chest/abdominal pain x 2 days. Found to have R pleural effusion , Treated for 

CAP.





# Exudative R pleural effusion: s/p pleural bx with organized empyema . 

possible due to  connection through the diaphragm due to R makayla-hepatic 

phlegmon .





s/p Right Vats, pleural bx, and drainage of pleural effusion , s/p  chest tube 

removal.





# Patient is going for colonoscopy in am and EGD ,continue golytely in am





# Ascending colitis on CT scan: repeat scan showed resolution of thickened wall 

on  ertapenem continue , on  miralax . 





# Retro- gastric fluid collection including  contrast and air :  Dr. Claudio 

was called by Dr. Mars. Pt will be re-evaluated.





#HTN : hold norvasc and on Cozaar instead of losartan.

## 2017-03-30 LAB
ANION GAP SERPL CALC-SCNC: 10 MMOL/L (ref 8–16)
BASOPHILS # BLD: 0.5 % (ref 0–2)
CALCIUM SERPL-MCNC: 8.2 MG/DL (ref 8.5–10.1)
CO2 SERPL-SCNC: 32 MMOL/L (ref 21–32)
CREAT SERPL-MCNC: 0.4 MG/DL (ref 0.55–1.02)
DEPRECATED RDW RBC AUTO: 14.6 % (ref 11.6–15.6)
EOSINOPHIL # BLD: 4.1 % (ref 0–4.5)
GLUCOSE SERPL-MCNC: 90 MG/DL (ref 74–106)
INR BLD: 1.29 (ref 0.82–1.09)
MCH RBC QN AUTO: 29.1 PG (ref 25.7–33.7)
MCHC RBC AUTO-ENTMCNC: 34.2 G/DL (ref 32–36)
MCV RBC: 85 FL (ref 80–96)
NEUTROPHILS # BLD: 61.7 % (ref 42.8–82.8)
PLATELET # BLD AUTO: 428 K/MM3 (ref 134–434)
PMV BLD: 6.8 FL (ref 7.5–11.1)
PT PNL PPP: 14.3 SEC (ref 9.98–11.88)
STREP DNASE B TITR SER: 106 U/ML (ref 0–120)
WBC # BLD AUTO: 5.7 K/MM3 (ref 4–10)

## 2017-03-30 PROCEDURE — 0DB98ZX EXCISION OF DUODENUM, VIA NATURAL OR ARTIFICIAL OPENING ENDOSCOPIC, DIAGNOSTIC: ICD-10-PCS | Performed by: INTERNAL MEDICINE

## 2017-03-30 PROCEDURE — 0DBF8ZX EXCISION OF RIGHT LARGE INTESTINE, VIA NATURAL OR ARTIFICIAL OPENING ENDOSCOPIC, DIAGNOSTIC: ICD-10-PCS | Performed by: INTERNAL MEDICINE

## 2017-03-30 RX ADMIN — POLYETHYLENE GLYCOL 3350 SCH GRAMS: 17 POWDER, FOR SOLUTION ORAL at 21:34

## 2017-03-30 RX ADMIN — LEVOTHYROXINE SODIUM SCH: 125 TABLET ORAL at 06:19

## 2017-03-30 RX ADMIN — HYDROCHLOROTHIAZIDE SCH: 12.5 CAPSULE ORAL at 10:20

## 2017-03-30 RX ADMIN — ERTAPENEM SODIUM SCH MLS/HR: 1 INJECTION, POWDER, LYOPHILIZED, FOR SOLUTION INTRAMUSCULAR; INTRAVENOUS at 09:47

## 2017-03-30 RX ADMIN — SENNOSIDES SCH: 8.6 TABLET, FILM COATED ORAL at 10:20

## 2017-03-30 RX ADMIN — LIDOCAINE SCH PATCH: 50 PATCH TOPICAL at 09:48

## 2017-03-30 RX ADMIN — SENNOSIDES SCH TAB: 8.6 TABLET, FILM COATED ORAL at 21:34

## 2017-03-30 RX ADMIN — POLYETHYLENE GLYCOL 3350 SCH: 17 POWDER, FOR SOLUTION ORAL at 13:28

## 2017-03-30 RX ADMIN — POLYETHYLENE GLYCOL 3350 SCH: 17 POWDER, FOR SOLUTION ORAL at 06:18

## 2017-03-30 RX ADMIN — LOSARTAN POTASSIUM SCH: 50 TABLET, FILM COATED ORAL at 10:20

## 2017-03-30 NOTE — PN
Progress Note (short form)





- Note


Progress Note: 


EGD / Colon reports placed in procedural section of physical chart and to be 

scanned into [facility]

## 2017-03-30 NOTE — PN
Progress Note (short form)





- Note


Progress Note: 


PULMONARY





Still some right sided discomfort but less. No fevers or chills.





 Last Vital Signs











Temp Pulse Resp BP Pulse Ox


 


 98.1 F   75   18   100/61   92 L


 


 03/30/17 09:00  03/30/17 09:00  03/30/17 09:00  03/30/17 09:00  03/30/17 09:00











Gen:  NAD at rest


Heart:  RRR


Lung: decreased breath sounds at the bases


Abd: soft, nontender


Ext: no edema








 CBC, BMP





 03/30/17 06:40 





 03/30/17 06:40 





Active Medications





Acetaminophen (Tylenol -)  650 mg PO Q6H PRN


   PRN Reason: FEVER OR PAIN


Heparin Sodium (Porcine) (Heparin -)  5,000 unit SQ [facility]ID [facility]


   Last Admin: 03/29/17 14:25 Dose:  5,000 unit


Hydrochlorothiazide (Hctz -)  12.5 mg PO DAILY [facility]


   Last Admin: 03/30/17 10:20 Dose:  Not Given


Ertapenem 1 gm/ Sodium (Chloride)  50 mls @ 100 mls/hr IVPB [facility]ILY [facility]


   Last Admin: 03/30/17 09:47 Dose:  100 mls/hr


Ipratropium Bromide (Atrovent 0.02% Nebulizer -)  1 amp NEB QIDR PRN


   PRN Reason: SHORT OF BREATH/WHEEZING


Levothyroxine Sodium (Synthroid -)  125 mcg PO DAILY@0700 [facility]


   Last Admin: 03/30/17 06:19 Dose:  Not Given


Lidocaine (Lidoderm Patch -)  1 patch TP [facility]ILY [facility]


   Last Admin: 03/30/17 09:48 Dose:  1 patch


Losartan Potassium (Cozaar -)  50 mg PO DAILY [facility]


   Last Admin: 03/30/17 10:20 Dose:  Not Given


Ondansetron HCl (Zofran Injection)  4 mg IVPUSH Q6H PRN


   PRN Reason: NAUSEA AND/OR VOMITING


Oxycodone HCl (Roxicodone -)  5 mg PO Q4H PRN


   PRN Reason: PAIN


Oxycodone HCl (Roxicodone -)  10 mg PO Q4H PRN


   PRN Reason: PAIN


Polyethylene Glycol (Miralax (For Daily Use) -)  17 gm PO TID [facility]


   Last Admin: 03/30/17 06:18 Dose:  Not Given


Senna (Senna -)  1 tab PO BID [facility]


   Last Admin: 03/30/17 10:20 Dose:  Not Given


Tramadol HCl (Ultram -)  50 mg PO Q6HPO [facility]


   Last Admin: 03/30/17 11:21 Dose:  Not Given








A/P


Intra-abdominal fluid collection


Exudative Pleural Effusion likely reactive


Atelectasis


HTN


Hypothyroidism





-  for EGD/colonoscopy


-  continue antibiotics


-  monitor fever curve, WBC trend


-  pain control


-  DVT prophylaxis

## 2017-03-30 NOTE — PN
Teaching Attending Note


Name of Resident: Dirk Mars


ATTENDING PHYSICIAN STATEMENT





I saw and evaluated the patient.


I reviewed the resident's note and discussed the case with the resident.


I agree with the resident's findings and plan as documented.


Patient is feeling better with no acute distress, wants to go home.  NO fever 

or chills, no shortness of breath.


 


 Vital Signs











Temperature  98.1 F   03/30/17 18:00


 


Pulse Rate  80   03/30/17 18:00


 


Respiratory Rate  18   03/30/17 18:00


 


Blood Pressure  110/59   03/30/17 18:00


 


O2 Sat by Pulse Oximetry (%)  94 L  03/30/17 15:16








 CBCD











WBC  5.7 K/mm3 (4.0-10.0)   03/30/17  06:40    


 


RBC  3.12 M/mm3 (3.60-5.2)  L  03/30/17  06:40    


 


Hgb  9.1 GM/dL (10.7-15.3)  L  03/30/17  06:40    


 


Hct  26.5 % (32.4-45.2)  L  03/30/17  06:40    


 


MCV  85.0 fl (80-96)   03/30/17  06:40    


 


MCHC  34.2 g/dl (32.0-36.0)   03/30/17  06:40    


 


RDW  14.6 % (11.6-15.6)   03/30/17  06:40    


 


Plt Count  428 K/MM3 (134-434)   03/30/17  06:40    


 


MPV  6.8 fl (7.5-11.1)  L  03/30/17  06:40    








 CMP











Sodium  139 mmol/L (136-145)   03/30/17  06:40    


 


Potassium  3.5 mmol/L (3.5-5.1)   03/30/17  06:40    


 


Chloride  97 mmol/L ()  L  03/30/17  06:40    


 


Carbon Dioxide  32 mmol/L (21-32)   03/30/17  06:40    


 


Anion Gap  10  (8-16)   03/30/17  06:40    


 


BUN  3 mg/dL (7-18)  L D 03/30/17  06:40    


 


Creatinine  0.4 mg/dL (0.55-1.02)  L  03/30/17  06:40    


 


Creat Clearance w eGFR  > 60  (>60)   03/26/17  05:20    


 


Random Glucose  90 mg/dL ()   03/30/17  06:40    


 


Calcium  8.2 mg/dL (8.5-10.1)  L  03/30/17  06:40    


 


Total Bilirubin  0.4 mg/dL (0.2-1.0)  D 03/26/17  05:20    


 


AST  42 U/L (15-37)  H D 03/26/17  05:20    


 


ALT  60 U/L (12-78)  D 03/26/17  05:20    


 


Alkaline Phosphatase  98 U/L ()   03/26/17  05:20    


 


Total Protein  6.0 g/dl (6.4-8.2)  L  03/26/17  05:20    


 


Albumin  2.3 g/dl (3.4-5.0)  L  03/26/17  05:20    








 CARDIAC ENZYMES











Creatine Kinase  41 IU/L ()   03/15/17  18:02    


 


Troponin I  < 0.02 ng/ml (0.00-0.05)   03/15/17  18:02    








 Current Medications











Generic Name Dose Route Start Last Admin





  Trade Name Freq  PRN Reason Stop Dose Admin


 


Acetaminophen  650 mg 03/29/17 02:33  





  Tylenol -  PO   





  Q6H PRN   





  FEVER OR PAIN   


 


Heparin Sodium (Porcine)  5,000 unit 03/29/17 06:00 03/29/17 14:25





  Heparin -  SQ   5,000 unit





  TID ROBIN   Administration


 


Hydrochlorothiazide  12.5 mg 03/29/17 10:00 03/30/17 10:20





  Hctz -  PO   Not Given





  DAILY ROBIN   


 


Ertapenem 1 gm/ Sodium  50 mls @ 100 mls/hr 03/29/17 10:00 03/30/17 09:47





  Chloride  IVPB   100 mls/hr





  DAILY ROBIN   Administration


 


Ipratropium Bromide  1 amp 03/28/17 12:37  





  Atrovent 0.02% Nebulizer -  NEB   





  QIDR PRN   





  SHORT OF BREATH/WHEEZING   


 


Levothyroxine Sodium  125 mcg 03/29/17 07:00 03/30/17 06:19





  Synthroid -  PO   Not Given





  DAILY@0700 ROBIN   


 


Lidocaine  1 patch 03/29/17 10:00 03/30/17 09:48





  Lidoderm Patch -  TP   1 patch





  DAILY ROBIN   Administration


 


Losartan Potassium  50 mg 03/29/17 10:00 03/30/17 10:20





  Cozaar -  PO   Not Given





  DAILY ROBIN   


 


Ondansetron HCl  4 mg 03/29/17 02:33  





  Zofran Injection  IVPUSH   





  Q6H PRN   





  NAUSEA AND/OR VOMITING   


 


Oxycodone HCl  5 mg 03/29/17 02:33  





  Roxicodone -  PO   





  Q4H PRN   





  PAIN   


 


Oxycodone HCl  10 mg 03/29/17 02:33  





  Roxicodone -  PO   





  Q4H PRN   





  PAIN   


 


Polyethylene Glycol  17 gm 03/29/17 06:00 03/30/17 13:28





  Miralax (For Daily Use) -  PO   Not Given





  TID ROBIN   


 


Senna  1 tab 03/29/17 10:00 03/30/17 10:20





  Senna -  PO   Not Given





  BID ROBIN   


 


Tramadol HCl  50 mg 03/29/17 06:00 03/30/17 17:22





  Ultram -  PO   50 mg





  Q6HPO ROBIN   Administration








 Home Medications











 Medication  Instructions  Recorded


 


Levothyroxine [Synthroid -] 125 mcg PO DAILY 09/27/14


 


Amlodipine Besylate [Norvasc -] 10 mg PO DAILY 03/15/17


 


Losartan/Hydrochlorothiazide 1 each PO DAILY 03/15/17





[Losartan-Hctz 50-12.5 mg Tab]  


 


Methocarbamol [Robaxin -] 500 mg PO DAILY PRN 03/15/17














ASSESSMENT AND PLAN:


Patient is a 57 y/o F w PMHx of HTN, hypothyroidism presented to ER w/ R sided 

chest/abdominal pain x 2 days. Found to have R pleural effusion , Treated for 

CAP.





# Patient  is s/p  colonoscopy and EGD result was reviewed , No NSAIDS for 3  

days. Ordered GI series waiting for result.





# Exudative R pleural effusion: s/p pleural bx . s/p Right Vats, pleural bx, 

and drainage of pleural effusion , s/p  chest tube removal. 





# Ascending colitis on CT scan: repeat scan showed resolution of thickened wall 

. continue ertapenem as per ID . 





#HTN : continue BP meds. 





DVT Px: Heparin

## 2017-03-30 NOTE — PN
Physical Exam: 


SUBJECTIVE: Patient seen and examined at bedside


in good spirits today


sister at [facility]


s/p colonoscopy and EGD 








OBJECTIVE:





 Vital Signs











 Period  Temp  Pulse  Resp  BP Sys/Olivares  Pulse Ox


 


 Last 24 Hr  98.1 F-98.8 F  75-90  15-19  /44-73  








GENERAL: The patient is awake, alert, and fully oriented, in no acute distress. 


HEAD: Normal with no signs of trauma. 


NECK:  supple. 


LUNGS:  diminished breath sounds on the right with fine basilar crackles. left 

side is clear. dressings is some serous discharge 


HEART: Regular rate and rhythm, S1, S2 without murmur, rub or gallop.


ABDOMEN: ABD soft non tender non distended 


NEUROLOGICAL: Cranial nerves II through XII grossly intact. Normal speech 


PSYCH: tearful

















 Laboratory Results - last 24 hr











  03/26/17 03/28/17 03/30/17





  05:20 07:00 06:40


 


WBC    5.7


 


RBC    3.12 L


 


Hgb    9.1 L


 


Hct    26.5 L


 


MCV    85.0


 


MCHC    34.2


 


RDW    14.6


 


Plt Count    428


 


MPV    6.8 L


 


Neutrophils %    61.7  D


 


Lymphocytes %    20.9  D


 


Monocytes %    12.8 H


 


Eosinophils %    4.1  D


 


Basophils %    0.5


 


INR   


 


Sodium   


 


Potassium   


 


Chloride   


 


Carbon Dioxide   


 


Anion Gap   


 


BUN   


 


Creatinine   


 


Random Glucose   


 


Calcium   


 


Histoplasma Antigen  0.15  


 


Anti-DNase B (Strep)   106 














  03/30/17 03/30/17





  06:40 06:40


 


WBC  


 


RBC  


 


Hgb  


 


Hct  


 


MCV  


 


MCHC  


 


RDW  


 


Plt Count  


 


MPV  


 


Neutrophils %  


 


Lymphocytes %  


 


Monocytes %  


 


Eosinophils %  


 


Basophils %  


 


INR  1.29 H 


 


Sodium   139


 


Potassium   3.5


 


Chloride   97 L


 


Carbon Dioxide   32


 


Anion Gap   10


 


BUN   3 L D


 


Creatinine   0.4 L


 


Random Glucose   90


 


Calcium   8.2 L


 


Histoplasma Antigen  


 


Anti-DNase B (Strep)  








Active Medications











Generic Name Dose Route Start Last Admin





  Trade Name Freq  PRN Reason Stop Dose Admin


 


Acetaminophen  650 mg 03/29/17 02:33  





  Tylenol -  PO   





  Q6H PRN   





  FEVER OR PAIN   


 


Heparin Sodium (Porcine)  5,000 unit 03/29/17 06:00 03/29/17 14:25





  Heparin -  SQ   5,000 unit





  TID ROBIN   Administration


 


Hydrochlorothiazide  12.5 mg 03/29/17 10:00 03/30/17 10:20





  Hctz -  PO   Not Given





  DAILY ROBIN   


 


Ertapenem 1 gm/ Sodium  50 mls @ 100 mls/hr 03/29/17 10:00 03/30/17 09:47





  Chloride  IVPB   100 mls/hr





  DAILY ROBIN   Administration


 


Ipratropium Bromide  1 amp 03/28/17 12:37  





  Atrovent 0.02% Nebulizer -  NEB   





  QIDR PRN   





  SHORT OF BREATH/WHEEZING   


 


Levothyroxine Sodium  125 mcg 03/29/17 07:00 03/30/17 06:19





  Synthroid -  PO   Not Given





  DAILY@0700 ROBIN   


 


Lidocaine  1 patch 03/29/17 10:00 03/30/17 09:48





  Lidoderm Patch -  TP   1 patch





  DAILY ROBIN   Administration


 


Losartan Potassium  50 mg 03/29/17 10:00 03/30/17 10:20





  Cozaar -  PO   Not Given





  DAILY ROBIN   


 


Ondansetron HCl  4 mg 03/29/17 02:33  





  Zofran Injection  IVPUSH   





  Q6H PRN   





  NAUSEA AND/OR VOMITING   


 


Oxycodone HCl  5 mg 03/29/17 02:33  





  Roxicodone -  PO   





  Q4H PRN   





  PAIN   


 


Oxycodone HCl  10 mg 03/29/17 02:33  





  Roxicodone -  PO   





  Q4H PRN   





  PAIN   


 


Polyethylene Glycol  17 gm 03/29/17 06:00 03/30/17 13:28





  Miralax (For Daily Use) -  PO   Not Given





  TID ROBIN   


 


Senna  1 tab 03/29/17 10:00 03/30/17 10:20





  Senna -  PO   Not Given





  BID ROBIN   


 


Tramadol HCl  50 mg 03/29/17 06:00 03/30/17 11:21





  Ultram -  PO   Not Given





  Q6HPO [facility]   











ASSESSMENT/PLAN:


57 y/o F w/PMH of HTN, hypothyroidism presented to ER w/ R sided chest/

abdominal pain x 2 days. Found to have R pleural effusion. she is now POD#5 s/p 

 bronchoscopy, Right VATS, pneumolysis, pleural biopsy, drainage of pleural 

effusion, insertion of chest tubex2





Right pleural effusion/Community acquired pneumonia/Sepsis (leukocytosis and 

fever): community acquired pneumonia with parapneumonic effusion with pleuritic 

chest pain. at this point i do not think the patient had a pneumonia as her 

chest reaccumulated with pleural effusions so quickly with antibiotics. pleural 

effusions likely reactive to mass that is extra thoracic close to the liver 

possibly a dropped stone 


s/p thoracentesis x2


patient had reaccumulation of fluid pulmonary requested repeat thoracentesis 

and second one was done 3/21/17


ID consult appreciated 


Azithromycin and Ceftriaxone stopped on day 5 


continue ertapenem day 10 given possible stone dropped in abdomen during 

cholecystectomy-Per ID 


pleural fluid studies-shows initial thoracentesis was exudative likely 

parapneuomonic effusion-cytology negative-cytology shows inflammatory cells 


Afebrile 


f/u pleural fluid studies is not revealing any infectious source 


pulmonary consult appreciated   


CT chest results noted 


Chest tube to waterseal per CT surgery to remove chest tube today 


pain control is very important for this patient so she could take deep breaths 

and does not splint. as well as incentive spirometry. this was explained to the 

patient in detail.  


 total complement-WNL 


Anti SS-pending


JAMA screen-positive x2 


histoplasmosis antigen-negative


histoplamosis urinary antigen-negative 


HIV-negative 


AntiDNAse-WNL 


Anti DS DNA-WNL


 


Pleural fluid exudative-per pathology report possible organizing empyema 





abdominal wall mass: this was seen on chest CT also read as a mass on abdominal 

CT after reviewing the scans with radiology they do not feel there is a mass in 

the liver. it is likely part of the abdominal wall vs a dropped stone from her 

cholecystectomy 


LFTs WNL 


will see if right flank/chest/abdominal pain improves with treatment of 

pneumonia-no significant improvement 


Repeat Abdominal CT noted


GI consult 


aspirated on 3/21/17 by IR- pathology report inflammatory cells 


Surgery consult appreciated-may need surgical drainage 





Ascending colitis seen on CT scan: Patient now with diarrhea 


infectious vs inflammatory


GI consult appreciated


s/p EGD and colonoscopy-results noted-small 1cm nodule in right colon which was 

biopsied


EGD-showed multiple excluded areas of the stomach diverticular in nature which 

is possible related to her gastroplasty. 


Will follow up UGI series  


Continue ertapenem 


Stool studies-pending 


 C diff-negative 


 





Mass posterior to stomach:


new mass seen on CT scan that was not there on prior CT on this admission


GI consult appreciated colonoscopy/EGD tomorrow  


on repeat CT scan of the abdomen it appears this mass has increased in size-

being read as possible contained leak. but this is likely the excluded part of 

the stomach as stated in the EGD report 


will follow up UGI series tomorrow 








HTN-patient is currently well controlled  


Hold Norvasc for now 


continue  Losartan and HCTZ with hold parameters 





Hypothyroidism


Synthroid 125mcg qam 





-FEN


no IVF 


no electrolyte issues  


regular diet  





PPx:


HSQ/SCDs


No criteria met for GI PPx


PT consult to avoid deconditioning as she had a major surgery 





Lab holiday tomorrow AM 





Visit type





- Emergency Visit


Emergency Visit: Yes


ED Registration Date: 03/15/17


Care time: The patient presented to the Emergency Department on the above date 

and was hospitalized for further evaluation of their emergent condition.





- New Patient


This patient is new to me today: No





- Critical Care


Critical Care patient: No

## 2017-03-31 VITALS — DIASTOLIC BLOOD PRESSURE: 72 MMHG | SYSTOLIC BLOOD PRESSURE: 109 MMHG | HEART RATE: 84 BPM | TEMPERATURE: 98.5 F

## 2017-03-31 LAB — Lab: 99 EU (ref 0–19)

## 2017-03-31 RX ADMIN — LIDOCAINE SCH PATCH: 50 PATCH TOPICAL at 10:07

## 2017-03-31 RX ADMIN — ERTAPENEM SODIUM SCH MLS/HR: 1 INJECTION, POWDER, LYOPHILIZED, FOR SOLUTION INTRAMUSCULAR; INTRAVENOUS at 10:07

## 2017-03-31 RX ADMIN — LOSARTAN POTASSIUM SCH: 50 TABLET, FILM COATED ORAL at 10:02

## 2017-03-31 RX ADMIN — HYDROCHLOROTHIAZIDE SCH: 12.5 CAPSULE ORAL at 10:02

## 2017-03-31 RX ADMIN — LEVOTHYROXINE SODIUM SCH: 125 TABLET ORAL at 06:13

## 2017-03-31 RX ADMIN — SENNOSIDES SCH TAB: 8.6 TABLET, FILM COATED ORAL at 10:08

## 2017-03-31 RX ADMIN — POLYETHYLENE GLYCOL 3350 SCH: 17 POWDER, FOR SOLUTION ORAL at 05:55

## 2017-03-31 NOTE — PN
Teaching Attending Note


Name of Resident: Dirk Mars


ATTENDING PHYSICIAN STATEMENT





I saw and evaluated the patient.


I reviewed the resident's note and discussed the case with the resident.


I agree with the resident's findings and plan as documented.





Patient is feeling better wants to go home.





 Vital Signs











Temperature  98.7 F   03/31/17 06:34


 


Pulse Rate  78   03/31/17 11:35


 


Respiratory Rate  18   03/31/17 06:34


 


Blood Pressure  117/74   03/31/17 06:34


 


O2 Sat by Pulse Oximetry (%)  93 L  03/31/17 11:35








 CBCD











WBC  5.7 K/mm3 (4.0-10.0)   03/30/17  06:40    


 


RBC  3.12 M/mm3 (3.60-5.2)  L  03/30/17  06:40    


 


Hgb  9.1 GM/dL (10.7-15.3)  L  03/30/17  06:40    


 


Hct  26.5 % (32.4-45.2)  L  03/30/17  06:40    


 


MCV  85.0 fl (80-96)   03/30/17  06:40    


 


MCHC  34.2 g/dl (32.0-36.0)   03/30/17  06:40    


 


RDW  14.6 % (11.6-15.6)   03/30/17  06:40    


 


Plt Count  428 K/MM3 (134-434)   03/30/17  06:40    


 


MPV  6.8 fl (7.5-11.1)  L  03/30/17  06:40    








 CMP











Sodium  139 mmol/L (136-145)   03/30/17  06:40    


 


Potassium  3.5 mmol/L (3.5-5.1)   03/30/17  06:40    


 


Chloride  97 mmol/L ()  L  03/30/17  06:40    


 


Carbon Dioxide  32 mmol/L (21-32)   03/30/17  06:40    


 


Anion Gap  10  (8-16)   03/30/17  06:40    


 


BUN  3 mg/dL (7-18)  L D 03/30/17  06:40    


 


Creatinine  0.4 mg/dL (0.55-1.02)  L  03/30/17  06:40    


 


Creat Clearance w eGFR  > 60  (>60)   03/26/17  05:20    


 


Random Glucose  90 mg/dL ()   03/30/17  06:40    


 


Calcium  8.2 mg/dL (8.5-10.1)  L  03/30/17  06:40    


 


Total Bilirubin  0.4 mg/dL (0.2-1.0)  D 03/26/17  05:20    


 


AST  42 U/L (15-37)  H D 03/26/17  05:20    


 


ALT  60 U/L (12-78)  D 03/26/17  05:20    


 


Alkaline Phosphatase  98 U/L ()   03/26/17  05:20    


 


Total Protein  6.0 g/dl (6.4-8.2)  L  03/26/17  05:20    


 


Albumin  2.3 g/dl (3.4-5.0)  L  03/26/17  05:20    








 CARDIAC ENZYMES











Creatine Kinase  41 IU/L ()   03/15/17  18:02    


 


Troponin I  < 0.02 ng/ml (0.00-0.05)   03/15/17  18:02    








 Current Medications











Generic Name Dose Route Start Last Admin





  Trade Name Freq  PRN Reason Stop Dose Admin


 


Acetaminophen  650 mg 03/29/17 02:33  





  Tylenol -  PO   





  Q6H PRN   





  FEVER OR PAIN   


 


Heparin Sodium (Porcine)  5,000 unit 03/29/17 06:00 03/29/17 14:25





  Heparin -  SQ   5,000 unit





  TID ROBIN   Administration


 


Hydrochlorothiazide  12.5 mg 03/29/17 10:00 03/31/17 10:02





  Hctz -  PO   Not Given





  DAILY ROBIN   


 


Ertapenem 1 gm/ Sodium  50 mls @ 100 mls/hr 03/29/17 10:00 03/31/17 10:07





  Chloride  IVPB   100 mls/hr





  DAILY ROBIN   Administration


 


Ipratropium Bromide  1 amp 03/28/17 12:37  





  Atrovent 0.02% Nebulizer -  NEB   





  QIDR PRN   





  SHORT OF BREATH/WHEEZING   


 


Levothyroxine Sodium  125 mcg 03/29/17 07:00 03/31/17 06:13





  Synthroid -  PO   Not Given





  DAILY@0700 ROBIN   


 


Lidocaine  1 patch 03/29/17 10:00 03/31/17 10:07





  Lidoderm Patch -  TP   1 patch





  DAILY ROBIN   Administration


 


Losartan Potassium  50 mg 03/29/17 10:00 03/31/17 10:02





  Cozaar -  PO   Not Given





  DAILY ROBIN   


 


Ondansetron HCl  4 mg 03/29/17 02:33  





  Zofran Injection  IVPUSH   





  Q6H PRN   





  NAUSEA AND/OR VOMITING   


 


Oxycodone HCl  5 mg 03/29/17 02:33  





  Roxicodone -  PO   





  Q4H PRN   





  PAIN   


 


Oxycodone HCl  10 mg 03/29/17 02:33  





  Roxicodone -  PO   





  Q4H PRN   





  PAIN   


 


Polyethylene Glycol  17 gm 03/29/17 06:00 03/31/17 05:55





  Miralax (For Daily Use) -  PO   Not Given





  TID ROBIN   


 


Senna  1 tab 03/29/17 10:00 03/31/17 10:08





  Senna -  PO   1 tab





  BID ROBIN   Administration


 


Tramadol HCl  50 mg 03/29/17 06:00 03/31/17 11:04





  Ultram -  PO   Not Given





  Q6HPO [facility]   








 Home Medications











 Medication  Instructions  Recorded


 


Levothyroxine [Synthroid -] 125 mcg PO DAILY 09/27/14


 


Amlodipine Besylate [Norvasc -] 10 mg PO DAILY 03/15/17


 


Losartan/Hydrochlorothiazide 1 each PO DAILY 03/15/17





[Losartan-Hctz 50-12.5 mg Tab]  


 


Methocarbamol [Robaxin -] 500 mg PO DAILY PRN 03/15/17




















History of gastroplasty, evaluate for fistulous tract. Under fluoroscopic 

observation, patient swallowed water-soluble contrast. No abnormality seen of 

the distal thoracic esophagus. No evidence of hiatal hernia. Surgical changes 

are noted in the region of the fundus of the stomach with surgical clips No 

evidence of extravasation of contrast material. No fistulous tract is seen. 

Within the limitation of examination, no gastric or duodenal bulb ulcer is 

seen. Normal mucosal pattern of duodenal sweep proximal loops of small bowel. 

The duodenal sweep is not widened. The late KUB shows normal mucosal pattern of 

opacified small bowel. Nonspecific gas pattern of colon is seen. Impression. 

Surgical changes are noted in the region of the fundus of the stomach. No 

evidence of extravasation of contrast, opacified fistulous tract. 





ASSESSMENT AND PLAN:


Patient is a 57 y/o F w PMHx of HTN, hypothyroidism presented to ER w/ R sided 

chest/abdominal pain x 2 days. Found to have R pleural effusion , Treated for 

CAP.


Patient can be discharged home for 5 more days of po antibiotic as per ID 

recommendations will discharge with Augmentin 875mg po bid x 5 days. Patient 

will follow with  in a week period in his office( Cardiothoracic 

surgeon).





# Patient  is s/p  colonoscopy and EGD result was reviewed , No NSAIDS for 3  

days. s/p GI series , the result is as above . 





# Exudative R pleural effusion: s/p pleural bx .





s/p Right Vats, pleural bx, and drainage of pleural effusion , s/p  chest tube 

removal. Follow with 





# Ascending colitis on CT scan: repeat scan showed resolution of thickened wall 

.completed ertapenem , 5 more days of po antibiotic . 





#HTN : On cozaar now will continue

## 2017-03-31 NOTE — DS
Addendum entered and electronically signed by Dirk Mars, RES  03/31/17 16:04: 


this is the last assessment and plan





55 y/o F w/PMH of HTN, hypothyroidism presented to ER w/ R sided chest/

abdominal pain x 2 days. Found to have R pleural effusion. she is now POD#6 s/p 

 bronchoscopy, Right VATS, pneumolysis, pleural biopsy, drainage of pleural 

effusion, insertion of chest tubex2





Right pleural effusion/Community acquired pneumonia/Sepsis (leukocytosis and 

fever): community acquired pneumonia with parapneumonic effusion with pleuritic 

chest pain. at this point i do not think the patient had a pneumonia as her 

chest reaccumulated with pleural effusions so quickly with antibiotics. pleural 

effusions likely reactive to mass that is extra thoracic close to the liver 

possibly a dropped stone 


s/p thoracentesis x2


patient had reaccumulation of fluid pulmonary requested repeat thoracentesis 

and second one was done 3/21/17


ID consult appreciated 


Azithromycin and Ceftriaxone stopped on day 5 


continue ertapenem day 11 given possible stone dropped in abdomen during 

cholecystectomy-Per ID 


pleural fluid studies-shows initial thoracentesis was exudative likely 

parapneuomonic effusion-cytology negative-cytology shows inflammatory cells 


Afebrile 


f/u pleural fluid studies is not revealing any infectious source 


pulmonary consult appreciated   


CT chest results noted 


Chest tube to waterseal per CT surgery to remove chest tube today 


pain control is very important for this patient so she could take deep breaths 

and does not splint. as well as incentive spirometry. this was explained to the 

patient in detail.  


 total complement-WNL 


Anti SS-positive


JAMA screen-positive x2 


histoplasmosis antigen-negative


histoplamosis urinary antigen-negative 


HIV-negative 


AntiDNAse-WNL 


Anti DS DNA-WNL


 


Pleural fluid exudative-per pathology report possible organizing empyema 





abdominal wall mass: this was seen on chest CT also read as a mass on abdominal 

CT after reviewing the scans with radiology they do not feel there is a mass in 

the liver. it is likely part of the abdominal wall vs a dropped stone from her 

cholecystectomy 


LFTs WNL 


will see if right flank/chest/abdominal pain improves with treatment of 

pneumonia-no significant improvement 


Repeat Abdominal CT noted


GI consult 


aspirated on 3/21/17 by IR- pathology report inflammatory cells 


Surgery consult appreciated-may need surgical drainage 





Ascending colitis seen on CT scan: Patient now with diarrhea 


infectious vs inflammatory


GI consult appreciated


s/p EGD and colonoscopy-results noted-small 1cm nodule in right colon which was 

biopsied


EGD-showed multiple excluded areas of the stomach diverticular in nature which 

is possible related to her gastroplasty. 


Will follow up UGI series  


Continue ertapenem 


Stool studies-pending 


 C diff-negative 


 





Mass posterior to stomach:


new mass seen on CT scan that was not there on prior CT on this admission


GI consult appreciated colonoscopy/EGD tomorrow  


on repeat CT scan of the abdomen it appears this mass has increased in size-

being read as possible contained leak. but this is likely the excluded part of 

the stomach as stated in the EGD report 


will follow up UGI series - WNL 








HTN-patient is currently well controlled  


Hold Norvasc for now 


continue  Losartan and HCTZ with hold parameters 





Hypothyroidism


Synthroid 125mcg qam 





-FEN


no IVF 


no electrolyte issues  


regular diet  





PPx:


HSQ/SCDs


No criteria met for GI PPx


PT consult to avoid deconditioning as she had a major surgery 











Original Note:





Physical Exam: 


SUBJECTIVE: Patient seen and examined at bedside


wants to go home 


stable for discharge 





OBJECTIVE:





 Vital Signs











 Period  Temp  Pulse  Resp  BP Sys/Olivares  Pulse Ox


 


 Last 24 Hr  98.1 F-98.7 F  76-84  18-20  107-117/59-74  91-93








PHYSICAL EXAM








GENERAL: The patient is awake, alert, and fully oriented, in no acute distress. 


HEAD: Normal with no signs of trauma. 


NECK:  supple. 


LUNGS:  diminished breath sounds on the right . left side is clear. 


HEART: Regular rate and rhythm, S1, S2 without murmur, rub or gallop.


ABDOMEN: ABD soft non tender non distended 


NEUROLOGICAL: Cranial nerves II through XII grossly intact. Normal speech 


PSYCH: in good spirits today 





LABS


 Laboratory Results - last 24 hr











  03/28/17





  07:00


 


Anti-ss DNA IgG Ab  99 H











HOSPITAL COURSE:





Date of Admission:03/15/17





Date of Discharge: 03/31/17








55 y/o F w/ PMH of HTN, hypothyroidism intially presented to ER w/ R sided chest

/abdominal pain x 2 days. Pain has been getting progressively worse, and was 

aggravated by movement, and was constant.  Pt has also had subjective fevers 

and chills for last 2 weeks.  Pt went to urgent care 2 weeks prior to 

presentation for this pain and was given muscle relaxants but they did not 

help. Pt went to PCP one week later and was given ibuprofen 800 mg q8h and 

percocet. Percocet helped with pain but overall pain was still progressively 

worse.  





Patient was admitted to the hospital and had a CXR and Chest CT which showed a 

large right pleural effusion. patient also found to have an extrathoracic mass 

abutting the right lobe of the liver but was not part of the liver. She had 

thoracentesis done twice which showed an exudative effusion. She also had the 

mass in the abdominal cavity abutting the liver biopsied which showed 

inflammatory cells. for the first couple of days she was spiking fevers to 

about 102-103 and ID consulted and initially she was treated for a complicated 

community acquired pneumonia with a parapneuomonic effusion with rocephin and 

azithromycin. She had 4 CT scan of the chest abd/pelv to evaluate this mass and 

pleural effusions. after speaking with radiology it seemed this mass had some 

calcification in it and it was thought this could have been a dropped stone 

from her previous cholecystectomy and this may have been causing a reactive 

right pleural effusion. antibiotics then changed to ertapenem. CT surgery was 

consulted and the patient had a VATS with pleurolysis and pleural biopsy and 

drainage of pleural effusion and was left with 2 chest tubes which were 

eventually removed once drainage significantly slowed down. All pathology and 

cytology was negative for malignancy and was consistent with inflammatory 

changes. She also had GI consult and surgery consult as on the second CT scan 

of the abdomen there was a fluid collection that accumulated posterior to the 

body of the stomach and she also had some colitis. stool studies negative and 

she had an EGD and colonoscopy. Colonoscopy showed a 1cm nodule in the right 

colon which was biopsied results pending. EGD was WNL except some outpouchings 

of the stomach which may represent this fluid collection whcih was seen on CT 

scan from a previous gastroplasty she had. She then had an Upper GI series 

which was WNL. rheumatological studies done which shows positive JAMA screen and 

positive anti SS DNA Ab. given follow up with rheumatology. Patient is now 

stable for discharge. 


Minutes to complete discharge: 65





<Dirk Mars - Last Filed: 03/31/17 15:46>


Physical Exam: 


SUBJECTIVE: Patient seen and examined








OBJECTIVE:





PHYSICAL EXAM





GENERAL: The patient is awake, alert, and fully oriented, in no acute distress.


HEAD: Normal with no signs of trauma.


EYES: PERRL, extraocular movements intact, sclera anicteric, conjunctiva clear. 


ENT: Ears normal, nares patent, oropharynx clear without exudates, moist mucous 

membranes.


NECK: Trachea midline, full range of motion, supple. 


LUNGS: Breath sounds equal, clear to auscultation bilaterally, no wheezes, no 

crackles, no accessory muscle use. 


HEART: Regular rate and rhythm, S1, S2 without murmur, rub or gallop.


ABDOMEN: Soft, nontender, nondistended, normoactive bowel sounds, no guarding, 

no rebound, no hepatosplenomegaly, no masses.


EXTREMITIES: 2+ pulses, warm, well-perfused, no edema. 


NEUROLOGICAL: Cranial nerves II through XII grossly intact. Normal speech, gait 

not observed.


PSYCH: Normal mood, normal affect.


SKIN: Warm, dry, normal turgor, no rashes or lesions noted.





LABS





HOSPITAL COURSE:





Date of Admission:03/15/17





Date of Discharge: 04/24/17








Patient is going to follow up as an outpatient with Holger Ceja ,will 

follow with the surgeon thoracic surgeon and the general surgeon.


all the information will be send to 's office for further follow 

up closely. Patient was suggested to come back if develops further fever or any 

discomfort. 





<Jordan Levy - Last Filed: 04/24/17 04:12>





Discharge Summary


Reason For Visit: PLEURAL EFFUSION


Current Active Problems





Dehydration (Acute) 


Epigastric pain (Acute) 


Hypokalemia (Acute) 


Pleural effusion (Acute) 


Pleuritic chest pain (Acute) 


Post-op pain (Acute) 


Status post thoracotomy (Acute) 


Hypertension (Chronic) 


Hypothyroidism (Chronic) 


Obesity (BMI 30-39.9) (Chronic) 











- Home Medications


Comprehensive Discharge Medication List: 


Ambulatory Orders





Levothyroxine [Synthroid -] 125 mcg PO DAILY 09/27/14 


Losartan/Hydrochlorothiazide [Losartan-Hctz 50-12.5 mg Tab] 1 each PO DAILY 03/

15/17 


Acetaminophen [Tylenol .Regular Strength -] 650 mg PO Q6H PRN #0 tablet 03/31/ 17 


Amox-Tr/K Cl [Augmentin - 875Mg Tablet] 1 tab PO BID #10 tablet 03/31/17 


Lidocaine 5% Patch [Lidoderm -] 1 patch TP DAILY #10 patch 03/31/17 











<Dirk Mars - Last Filed: 03/31/17 15:46>





- Home Medications


Comprehensive Discharge Medication List: 


Ambulatory Orders





Levothyroxine [Synthroid -] 125 mcg PO DAILY 09/27/14 


Losartan/Hydrochlorothiazide [Losartan-Hctz 50-12.5 mg Tab] 1 each PO DAILY 03/

15/17 


Acetaminophen [Tylenol .Regular Strength -] 650 mg PO Q6H PRN #0 tablet 03/31/ 17 


Amox-Tr/K Cl [Augmentin - 875Mg Tablet] 1 tab PO BID #10 tablet 03/31/17 


Lidocaine 5% Patch [Lidoderm -] 1 patch TP DAILY #10 patch 03/31/17 











<Jordan Levy - Last Filed: 04/24/17 04:12>


Condition: Improved





- Instructions


Diet, Activity, Other Instructions: 


follow up with your primary care doctor you expressed do not want to follow 

with that office anymore 


you can follow up with me Dr. Mars at [address] [place] [address] [phone] i am there 

every thursday from 9-12 please call for appointment 


follow up with all doctors for results of your biopsies


continue your medications as prescribed


hold your norvasc until your blood pressure is checked in the office 


take the antibiotics for 5 more days 


you need to follow up with a rheumotologist 


eat a low sodium diet





Referrals: 


Brennan Hewitt DO [Staff Physician] - 2 Weeks


Obie Sebastian MD [Staff Physician] - 1 Week


(positive JAMA Screen 


Positive single stranded DNA AB )


José Novak MD [Staff Physician] - 2 Weeks


Holger Mcconnell MD [Staff Physician] - 1 Week (this is the office i work at 

follow up with me here )


Reyes Stewart [Staff Physician] - 2 Weeks


Disposition: HOME


This patient is new to me today: No


Emergency Visit: Yes


ED Registration Date: 03/15/17


Care time: The patient presented to the Emergency Department on the above date 

and was hospitalized for further evaluation of their emergent condition.


Critical Care patient: No





- Discharge Referral


Referred to [facility] No





<Dirk Mars - Last Filed: 03/31/17 15:46>

## 2017-04-03 NOTE — PATH
Surgical Pathology Report



Patient Name:  ISMAEL LAY

Accession #:  [id number]

[facility]. Rec. #:  [id number]                                                        

   /Age/Gender:  1960 (Age: 56) / F

Account:  [id number]                                                          

             Location: [address] [place] [address]

Taken:  3/30/2017

Received:  3/31/2017

Reported:  4/3/2017

Physicians:  Augusto Hewitt D.O.

  



Specimen(s) Received

A: BX DUODENAL BULB 

B: POLYP RIGHT COLON 





Clinical History

Abnormal CT scan

Hiatal hernia, altered gastric anatomy, submucosal nodule, hemorrhoids







Final Diagnosis

A. DUODENUM, BULB, BIOPSY:  

MILD NON--SPECIFIC CHRONIC DUODENITIS.

BRUNNER'S GLAND HYPERPLASIA IS PRESENT.

NO HISTOLOGIC EVIDENCE OF GLUTEN SENSITIVE ENTEROPATHY (CELIAC SPRUE)

IDENTIFIED.

NO LYMPHOMA OR EPITHELIAL NEOPLASM IDENTIFIED.



B. COLON, RIGHT, BIOPSY:  

COLONIC MUCOSA WITH NO PATHOLOGIC CHANGES.

NO ADENOMATOUS OR HYPERPLASTIC CHANGES IDENTIFIED.

EXTREMELY SCANT SUBMUCOSAL TISSUE PRESENT, WITH NO NEOPLASM IDENTIFIED.



Comment:  Recommend correlation with clinical and radiologic findings and follow

up as clinically indicated.







***Electronically Signed***

Darren Colorado M.D.





Gross Description

A.  Received in formalin, labeled "biopsy duodenal bulb" are 3 tan, irregular

portions of soft tissue ranging from 0.1-0.3 cm. in greatest dimension. The

specimens are submitted in toto in one cassette.



B.  Received in formalin, labeled "polyp right colon" are 6 tan, irregular

portions of soft tissue ranging from 0.1-0.3 cm. in greatest dimension. The

specimens are submitted in toto in one cassette.

/3/31/2017



saudi/3/31/2017

## 2021-04-19 ENCOUNTER — HOSPITAL ENCOUNTER (EMERGENCY)
Dept: HOSPITAL 74 - JER | Age: 61
Discharge: HOME | End: 2021-04-19
Payer: SELF-PAY

## 2021-04-19 VITALS — TEMPERATURE: 98.3 F | SYSTOLIC BLOOD PRESSURE: 129 MMHG | HEART RATE: 85 BPM | DIASTOLIC BLOOD PRESSURE: 75 MMHG

## 2021-04-19 VITALS — BODY MASS INDEX: 40.8 KG/M2

## 2021-04-19 DIAGNOSIS — L02.212: Primary | ICD-10-CM

## 2025-07-10 NOTE — PN
Patient informed she needs an appointment in order to get refills because she has not been seen in the office in more than a year.  Patient has scheduled an appointment.   Progress Note (short form)





- Note


Progress Note: 


56  yr old female with history of a VBG done in 1990 at [facility] , s/p 

revision due to stenosis, s/p several other operations including 

Cholecystectomy and most recently a ventral hernia repair done by me.  She is 

currently in 3rd week of hospitalization for inflammatory effusion, s/p VATS 

and findings on Ct scan concerning for  a retrogastric collection, parahepatic 

process and possible ascending colon mass.


She is shceduled for Upper and lower endoscopies tomorrow.


Patient examined and chart reviewed including most recent Ct scan.


The retrogastric collection containing air and contrast  appears to be a 

diverticulum with disruption of staple line and EGD will be most helpful


It is unclear to me whether this has anything to do with current illness.


However endoscopies will be most helpful.


Will follow up tomorrow and consider an upper GI series  friday to further 

elucidate anatomy